# Patient Record
Sex: FEMALE | URBAN - METROPOLITAN AREA
[De-identification: names, ages, dates, MRNs, and addresses within clinical notes are randomized per-mention and may not be internally consistent; named-entity substitution may affect disease eponyms.]

---

## 2021-05-27 ENCOUNTER — RECORDS - HEALTHEAST (OUTPATIENT)
Dept: ADMINISTRATIVE | Facility: CLINIC | Age: 71
End: 2021-05-27

## 2022-08-16 ENCOUNTER — APPOINTMENT (OUTPATIENT)
Dept: GENERAL RADIOLOGY | Age: 72
End: 2022-08-16
Payer: MEDICARE

## 2022-08-16 ENCOUNTER — APPOINTMENT (OUTPATIENT)
Dept: CT IMAGING | Age: 72
End: 2022-08-16
Payer: MEDICARE

## 2022-08-16 ENCOUNTER — HOSPITAL ENCOUNTER (EMERGENCY)
Age: 72
Discharge: LEFT BEFORE DISCHARGE | End: 2022-08-16
Attending: EMERGENCY MEDICINE
Payer: MEDICARE

## 2022-08-16 VITALS
HEART RATE: 131 BPM | WEIGHT: 150.35 LBS | OXYGEN SATURATION: 100 % | TEMPERATURE: 98.2 F | SYSTOLIC BLOOD PRESSURE: 157 MMHG | RESPIRATION RATE: 19 BRPM | DIASTOLIC BLOOD PRESSURE: 102 MMHG

## 2022-08-16 DIAGNOSIS — I48.91 ATRIAL FIBRILLATION WITH RAPID VENTRICULAR RESPONSE (HCC): ICD-10-CM

## 2022-08-16 DIAGNOSIS — R93.89 ABNORMAL CHEST X-RAY: ICD-10-CM

## 2022-08-16 DIAGNOSIS — G45.9 TRANSIENT ISCHEMIC ATTACK: Primary | ICD-10-CM

## 2022-08-16 LAB
A/G RATIO: 1.5 (ref 1.1–2.2)
ALBUMIN SERPL-MCNC: 4.3 G/DL (ref 3.4–5)
ALP BLD-CCNC: 167 U/L (ref 40–129)
ALT SERPL-CCNC: <5 U/L (ref 10–40)
ANION GAP SERPL CALCULATED.3IONS-SCNC: 13 MMOL/L (ref 3–16)
APTT: 26.5 SEC (ref 23–34.3)
AST SERPL-CCNC: 18 U/L (ref 15–37)
BASOPHILS ABSOLUTE: 0 K/UL (ref 0–0.2)
BASOPHILS RELATIVE PERCENT: 0.5 %
BILIRUB SERPL-MCNC: 0.4 MG/DL (ref 0–1)
BUN BLDV-MCNC: 9 MG/DL (ref 7–20)
CALCIUM SERPL-MCNC: 9.3 MG/DL (ref 8.3–10.6)
CHLORIDE BLD-SCNC: 103 MMOL/L (ref 99–110)
CO2: 25 MMOL/L (ref 21–32)
CREAT SERPL-MCNC: 0.6 MG/DL (ref 0.6–1.2)
EOSINOPHILS ABSOLUTE: 0.1 K/UL (ref 0–0.6)
EOSINOPHILS RELATIVE PERCENT: 1 %
GFR AFRICAN AMERICAN: >60
GFR NON-AFRICAN AMERICAN: >60
GLUCOSE BLD-MCNC: 104 MG/DL (ref 70–99)
GLUCOSE BLD-MCNC: 118 MG/DL (ref 70–99)
HCT VFR BLD CALC: 36.6 % (ref 36–48)
HEMOGLOBIN: 12.2 G/DL (ref 12–16)
INR BLD: 0.98 (ref 0.87–1.14)
LYMPHOCYTES ABSOLUTE: 0.4 K/UL (ref 1–5.1)
LYMPHOCYTES RELATIVE PERCENT: 9 %
MCH RBC QN AUTO: 29.1 PG (ref 26–34)
MCHC RBC AUTO-ENTMCNC: 33.4 G/DL (ref 31–36)
MCV RBC AUTO: 87.2 FL (ref 80–100)
MONOCYTES ABSOLUTE: 0.4 K/UL (ref 0–1.3)
MONOCYTES RELATIVE PERCENT: 7.1 %
NEUTROPHILS ABSOLUTE: 4 K/UL (ref 1.7–7.7)
NEUTROPHILS RELATIVE PERCENT: 82.4 %
PDW BLD-RTO: 15.5 % (ref 12.4–15.4)
PERFORMED ON: ABNORMAL
PLATELET # BLD: 228 K/UL (ref 135–450)
PMV BLD AUTO: 7.7 FL (ref 5–10.5)
POTASSIUM REFLEX MAGNESIUM: 4.3 MMOL/L (ref 3.5–5.1)
PROTHROMBIN TIME: 12.9 SEC (ref 11.7–14.5)
RBC # BLD: 4.2 M/UL (ref 4–5.2)
SODIUM BLD-SCNC: 141 MMOL/L (ref 136–145)
TOTAL PROTEIN: 7.1 G/DL (ref 6.4–8.2)
TROPONIN: <0.01 NG/ML
WBC # BLD: 4.9 K/UL (ref 4–11)

## 2022-08-16 PROCEDURE — 80053 COMPREHEN METABOLIC PANEL: CPT

## 2022-08-16 PROCEDURE — 85025 COMPLETE CBC W/AUTO DIFF WBC: CPT

## 2022-08-16 PROCEDURE — 93005 ELECTROCARDIOGRAM TRACING: CPT | Performed by: EMERGENCY MEDICINE

## 2022-08-16 PROCEDURE — 71045 X-RAY EXAM CHEST 1 VIEW: CPT

## 2022-08-16 PROCEDURE — 70496 CT ANGIOGRAPHY HEAD: CPT

## 2022-08-16 PROCEDURE — 6360000004 HC RX CONTRAST MEDICATION: Performed by: EMERGENCY MEDICINE

## 2022-08-16 PROCEDURE — 85610 PROTHROMBIN TIME: CPT

## 2022-08-16 PROCEDURE — 84484 ASSAY OF TROPONIN QUANT: CPT

## 2022-08-16 PROCEDURE — 36415 COLL VENOUS BLD VENIPUNCTURE: CPT

## 2022-08-16 PROCEDURE — 99285 EMERGENCY DEPT VISIT HI MDM: CPT

## 2022-08-16 PROCEDURE — 70450 CT HEAD/BRAIN W/O DYE: CPT

## 2022-08-16 PROCEDURE — 6370000000 HC RX 637 (ALT 250 FOR IP): Performed by: EMERGENCY MEDICINE

## 2022-08-16 PROCEDURE — 85730 THROMBOPLASTIN TIME PARTIAL: CPT

## 2022-08-16 RX ORDER — ATENOLOL 50 MG/1
50 TABLET ORAL DAILY
Qty: 30 TABLET | Refills: 1 | Status: SHIPPED | OUTPATIENT
Start: 2022-08-16 | End: 2022-08-24

## 2022-08-16 RX ADMIN — APIXABAN 5 MG: 5 TABLET, FILM COATED ORAL at 19:58

## 2022-08-16 RX ADMIN — METOPROLOL TARTRATE 50 MG: 25 TABLET, FILM COATED ORAL at 19:58

## 2022-08-16 RX ADMIN — IOPAMIDOL 75 ML: 755 INJECTION, SOLUTION INTRAVENOUS at 18:22

## 2022-08-16 ASSESSMENT — ENCOUNTER SYMPTOMS
EYE DISCHARGE: 0
SHORTNESS OF BREATH: 0
DIARRHEA: 0
VOMITING: 0
COUGH: 0
EYE PAIN: 0
NAUSEA: 0
SORE THROAT: 0
WHEEZING: 0
BACK PAIN: 0
RHINORRHEA: 0
ABDOMINAL PAIN: 0

## 2022-08-16 NOTE — CONSULTS
I was contacted to provide recommendations on fibrinolytics and/or endovascular therapies for Ms. Jossie Wilson, who had reported left leg weakness, left facial droop, and dysarthria, last known well around 15:30. I saw and evaluated the patient over telemedicine. Although at onset her leg weakness was so profound that she says she couldn't lift it, at the time of my eval she was nearly back to her baseline. She did have a left lower facial droop and subtle left leg weakness (no drift, so I did not score her for it on the NIHSS, but it did seem as though she had more difficulty holding it up than the right). Full NIHSS as follows:    NIHSS was performed by myself at 18:45hr. NIHSS total score was 1. Score breakdown is as follows: LOC = 0, LOC questions = 0, LOC commands = 0, best gaze = 0, visual = 0, facial palsy = 1, motor LUE = 0, motor RUE = 0, motor LLE = 0, motor RLE = 0, limb ataxia = 0, sensory = 0, best language = 0, dysarthria = 0, extinction/inattention = 0. No hemorrhage or large vessel occlusion seen on her CT/CTA of the head/neck. I discussed the risks/benefits of lysis with the patient while her daughter was present in the room. Given the non-disabling nature of her deficits I would not recommend fibrinolysis and there is no indication for any endovascular intervention.       Tequila Dougherty MD  [facility] Stroke Team

## 2022-08-16 NOTE — ED PROVIDER NOTES
[facility]  eMERGENCY dEPARTMENT eNCOUnter        Pt Name: Silvino Esparza  MRN: [id number]  Mag[place]fgiov[place] 1950  Date of evaluation: 8/16/2022  Provider: Rupinder Grajeda MD  PCP: No primary care provider on file. CHIEF COMPLAINT       Chief Complaint   Patient presents with    Extremity Weakness       HISTORY OFPRESENT ILLNESS   (Location/Symptom, Timing/Onset, Context/Setting, Quality, Duration, Modifying Factors,Severity)  Note limiting factors. Silvino Esparza is a 66 y.o. female   with a history of    has no past medical history on file. Who presents with left-sided weakness. Last known well was around 330 this afternoon. Basically she got weak on the left side and could not get off the couch. She called her daughter around 5:00 and the patient comes in via EMS. She has no pain. Her symptoms are improving. She has not seen a doctor in 48 years. Nursing Notes were all reviewed and agreed with or any disagreements were addressed  in the HPI. REVIEW OF SYSTEMS    (2-9 systems for level 4, 10 or more for level 5)     Review of Systems   Constitutional:  Negative for chills, fatigue and fever. HENT:  Negative for ear pain, rhinorrhea and sore throat. Eyes:  Negative for pain, discharge and visual disturbance. Respiratory:  Negative for cough, shortness of breath and wheezing. Cardiovascular:  Negative for chest pain, palpitations and leg swelling. Gastrointestinal:  Negative for abdominal pain, diarrhea, nausea and vomiting. Genitourinary:  Negative for difficulty urinating, dysuria, pelvic pain and vaginal discharge. Musculoskeletal:  Negative for arthralgias, back pain, joint swelling and neck pain. Skin:  Negative for rash. Allergic/Immunologic: Negative for environmental allergies. Neurological:  Positive for weakness. Negative for dizziness, seizures, syncope and headaches. Hematological:  Negative for adenopathy. Psychiatric/Behavioral:  Negative for dysphoric mood and suicidal ideas. The patient is not nervous/anxious. PAST MEDICAL HISTORY   No past medical history on file. SURGICAL HISTORY   No past surgical history on file. CURRENTMEDICATIONS       Previous Medications    No medications on file       ALLERGIES     Patient has no allergy information on record. FAMILY HISTORY     No family history on file. SOCIAL HISTORY       Social History     Socioeconomic History    Marital status:        SCREENINGS   NIH Stroke Scale  Interval: Baseline  Level of Consciousness (1a): Alert  LOC Questions (1b): Answers both correctly  LOC Commands (1c): Performs both tasks correctly  Best Gaze (2): Normal  Visual (3): No visual loss  Facial Palsy (4): Normal symmetrical movement  Motor Arm, Left (5a): No drift  Motor Arm, Right (5b): No drift  Motor Leg, Left (6a): No drift  Motor Leg, Right (6b): No drift  Limb Ataxia (7): Absent  Sensory (8): Normal  Best Language (9): No aphasia  Dysarthria (10): Normal  Extinction and Inattention (11): No abnormality  Total: 0         PHYSICAL EXAM    (up to 7 for level 4, 8 or more for level 5)     ED Triage Vitals   BP Temp Temp src Pulse Resp SpO2 Height Weight   -- -- -- -- -- -- -- --      weight is 150 lb 5.7 oz (68.2 kg). Her oral temperature is 98.2 °F (36.8 °C). Her blood pressure is 157/102 (abnormal) and her pulse is 131 (abnormal). Her respiration is 19 and oxygen saturation is 100%. Physical Exam  Constitutional:       Appearance: She is well-developed. She is not diaphoretic. HENT:      Head: Normocephalic and atraumatic. Right Ear: External ear normal.      Left Ear: External ear normal.   Eyes:      General: No scleral icterus. Right eye: No discharge. Left eye: No discharge. Neck:      Thyroid: No thyromegaly. Vascular: No JVD. Trachea: No tracheal deviation. Cardiovascular:      Rate and Rhythm: Normal rate. Rhythm irregularly irregular. Heart sounds: Normal heart sounds. No murmur heard. No friction rub. No gallop. Pulmonary:      Effort: Pulmonary effort is normal. No respiratory distress. Breath sounds: Normal breath sounds. No stridor. No wheezing or rales. Abdominal:      General: There is no distension. Palpations: Abdomen is soft. Tenderness: There is no abdominal tenderness. There is no guarding or rebound. Musculoskeletal:         General: No tenderness. Cervical back: Normal range of motion. Skin:     General: Skin is warm and dry. Findings: No rash (On exposed body surfaces). Neurological:      Mental Status: She is alert and oriented to person, place, and time. GCS: GCS eye subscore is 4. GCS verbal subscore is 5. GCS motor subscore is 6. Cranial Nerves: Cranial nerve deficit present. Sensory: Sensation is intact. Motor: Weakness present. Coordination: Coordination is intact. Coordination normal. Finger-Nose-Finger Test normal.      Gait: Gait is intact. Comments: So, initially I did notice that this seems to be some asymmetry in her smile and also her left eyebrow was lower than her right. When she raise the eyebrows they became symmetric. Patient assured me that there was no change in her smile. We used her phone in the camera to let her look at it herself. Later on her daughter came in and there was some ambiguity still. They then used the cameras on the phone to show it to her other daughter who is a nurse and she felt that she did have a left facial droop. In addition the nurse felt that the left leg was a bit weaker than the right. I got more of the impression that they were symmetric. So, we did upgrade her to an NIH of 2 from 0. It is not because her symptoms got worse but more of a change in the interpretation of her exam and history. Psychiatric:         Behavior: Behavior normal.         Thought Content:  Thought Physician who either signs orCo-signs this chart in the absence of a cardiologist.    EKG visualized preliminarily interpreted by myself shows atrial fibrillation with a rapid ventricular response. The rate is 127 the axis is 30. Typical diffuse nonspecific ST and T wave findings. RADIOLOGY:   plain film images such as CT, Ultrasound and MRI are read by the radiologist. Roberto Loosen radiographic images are visualized and preliminarily interpreted by the  EDProvider with the below findings:    CT HEAD WO CONTRAST    Addendum Date: 8/16/2022    ADDENDUM: Findings were discussed with Gricelda Hernandez at 6:33 pm on 8/16/2022. Result Date: 8/16/2022  EXAMINATION: CT OF THE HEAD WITHOUT CONTRAST  8/16/2022 6:13 pm TECHNIQUE: CT of the head was performed without the administration of intravenous contrast. Automated exposure control, iterative reconstruction, and/or weight based adjustment of the mA/kV was utilized to reduce the radiation dose to as low as reasonably achievable. COMPARISON: None. HISTORY: ORDERING SYSTEM PROVIDED HISTORY: Stroke Symptoms TECHNOLOGIST PROVIDED HISTORY: Has a \"code stroke\" or \"stroke alert\" been called? ->Yes Reason for exam:->Stroke Symptoms Decision Support Exception - unselect if not a suspected or confirmed emergency medical condition->Emergency Medical Condition (MA) FINDINGS: BRAIN/VENTRICLES: There is no acute intracranial hemorrhage, mass effect or midline shift. No abnormal extra-axial fluid collection. The gray-white differentiation is maintained without evidence of an acute infarct. There is no evidence of hydrocephalus. Mild chronic white matter microvascular ischemic change is noted. ORBITS: The visualized portion of the orbits demonstrate no acute abnormality. SINUSES: The visualized paranasal sinuses and mastoid air cells demonstrate no acute abnormality. SOFT TISSUES/SKULL:  No acute abnormality of the visualized skull or soft tissues.      No CT evidence of an acute infarct. XR CHEST PORTABLE    Result Date: 8/16/2022  EXAMINATION: ONE XRAY VIEW OF THE CHEST 8/16/2022 6:13 pm COMPARISON: None. HISTORY: ORDERING SYSTEM PROVIDED HISTORY: stroke symptoms TECHNOLOGIST PROVIDED HISTORY: Reason for exam:->stroke symptoms Reason for Exam: stroke symptoms FINDINGS: There is a 6.9 cm in diameter mass or masslike consolidation within the medial right lung apex. The heart size is normal.  The lungs are otherwise clear. Contrast is noted in both renal collecting systems. Mass within the medial right upper lobe. Recommend CT of the chest for further evaluation. CTA HEAD NECK W CONTRAST    Result Date: 8/16/2022  EXAMINATION: CTA OF THE HEAD AND NECK WITH CONTRAST 8/16/2022 6:13 pm: TECHNIQUE: CTA of the head and neck was performed with the administration of intravenous contrast. Multiplanar reformatted images are provided for review. MIP images are provided for review. Stenosis of the internal carotid arteries measured using NASCET criteria. Automated exposure control, iterative reconstruction, and/or weight based adjustment of the mA/kV was utilized to reduce the radiation dose to as low as reasonably achievable. COMPARISON: None. HISTORY: ORDERING SYSTEM PROVIDED HISTORY: Stroke Symptoms TECHNOLOGIST PROVIDED HISTORY: Has a \"code stroke\" or \"stroke alert\" been called? ->Yes Reason for exam:->Stroke Symptoms Decision Support Exception - unselect if not a suspected or confirmed emergency medical condition->Emergency Medical Condition (MA) Reason for Exam: Left sided weakness 1 hour PTA FINDINGS: CTA NECK: AORTIC ARCH/ARCH VESSELS: No dissection or arterial injury. No significant stenosis of the brachiocephalic or subclavian arteries. CAROTID ARTERIES: No dissection, arterial injury, or hemodynamically significant stenosis by NASCET criteria. There is beading of both mid cervical ICAs, likely reflecting fibromuscular dysplasia (FMD).  VERTEBRAL ARTERIES: No dissection, arterial injury, or significant stenosis. SOFT TISSUES: There is a mass or masslike consolidation within the right lung apex measuring approximately 4.4 x 6.7 cm in diameter. BONES: No acute osseous abnormality. CTA HEAD: ANTERIOR CIRCULATION: No significant stenosis of the intracranial internal carotid, anterior cerebral, or middle cerebral arteries. No aneurysm. Carotid siphon calcifications are present. POSTERIOR CIRCULATION: The vertebrobasilar system is within normal limits. There are severe stenoses of the P1 segment of the right posterior cerebral artery. OTHER: No dural venous sinus thrombosis on this non-dedicated study. BRAIN: See separately dictated noncontrast head CT report. No large vessel occlusion visualized within the head or neck. Severe stenoses of the proximal right PCA. Probable FMD of both cervical ICAs. Incidentally noted right apical lung mass or masslike consolidation. Correlate for clinical signs of pneumonia and recommend dedicated CT of the chest for further evaluation. PROCEDURES   Unless otherwise noted below, none     Procedures    CRITICAL CARE TIME   CRITICAL CARE: There was a high probability of clinically significant/life threatening deterioration in this patient's condition which required my urgent intervention. Total critical care time was 66 minutes. This excludes any time for separately reportable procedures.          CONSULTS:  IP CONSULT TO STROKE TEAM  IP CONSULT TO PHARMACY  PHARMACY TO CHANGE BASE FLUIDS    EMERGENCY DEPARTMENT COURSE and DIFFERENTIAL DIAGNOSIS/MDM:   Vitals:    Vitals:    08/16/22 1815 08/16/22 1915 08/16/22 1958 08/16/22 2000   BP: (!) 148/103 (!) 147/75 (!) 157/102 (!) 157/102   Pulse: (!) 120 (!) 122 (!) 128 (!) 131   Resp: 18 16  19   Temp: 98.3 °F (36.8 °C)   98.2 °F (36.8 °C)   TempSrc: Oral   Oral   SpO2: 99% 100%  100%   Weight: 150 lb 5.7 oz (68.2 kg)          Patient was given the following medications:  Medications   iopamidol (ISOVUE-370) 76 % injection 75 mL (75 mLs IntraVENous Given 22)   metoprolol tartrate (LOPRESSOR) tablet 50 mg (50 mg Oral Given 22)   apixaban (ELIQUIS) tablet 5 mg (5 mg Oral Given 22)       The patient was made a stroke alert. She was seen within 10 minutes. All metrics were met. My initial impression was that the NIH was 0 however after the family arrived and we gave her a point for the facial abnormality and further discussion with the nurse was that there was some weakness in the left leg compared to the right we upgraded this to 2. Again this was not because of a change in exam but made it rather more due to a combination of subjective and objective interpretation. Regardless, at that point we were at 3 hours. I used the televideo services with the stroke team, Dr. Rosalina Mckee, who talked to the patient and evaluated her. He felt that the burden of stroke was certainly not strong enough to warrant the risk of thrombolytic. The patient then decided that she wanted to go home. She basically does not want any aggressive health care. Apparently her  recently  from liver cancer and she watched him suffer over a year. She does not want to go through similar problems. I did sit and talk to her for at least 10 minutes about this and I explained that there was a very high probability of her having a major stroke in the next 2 days. In addition I pointed out the abnormality on the chest x-ray and the abnormality on her cardiogram.  She was quite adamant. She is fully oriented and I cannot keep her against her will. She ambulated to the bathroom without difficulty. She agreed to start some Eliquis and beta-blockers. I gave her the first dose here and wrote prescriptions for atenolol and Eliquis. She does not even have a family doctor for me to arrange follow-up. The grandson is going to stay with her over the next few days.   I did put in a primary care provider referral.  Unfortunately, she did sign out against advice. Is this patient to be included in the SEP-1 Core Measure due to severe sepsis or septic shock? No   Exclusion criteria - the patient is NOT to be included for SEP-1 Core Measure due to: Infection is not suspected    FINAL IMPRESSION      1. Transient ischemic attack    2. Atrial fibrillation with rapid ventricular response ([facility] [address]    3. Abnormal chest x-ray          DISPOSITION/PLAN   DISPOSITION [place] 08/16/2022 08:05:27 PM      PATIENT REFERRED [facility] Pre[facility]Services  [phone]        DISCHARGE MEDICATIONS:  New Prescriptions    APIXABAN (ELIQUIS) 5 MG TABS TABLET    Take 1 tablet by mouth in the morning and 1 tablet before bedtime. ATENOLOL (TENORMIN) 50 MG TABLET    Take 1 tablet by mouth in the morning.        DISCONTINUED MEDICATIONS:  Discontinued Medications    No medications on file              (Please note that portions of this note were completed with a voice recognition program.  Efforts were made to editthe dictations but occasionally words are mis-transcribed.)    Jhoana Agarwal MD (electronically signed)            Jhoana Agarwal MD  08/16/22 2011

## 2022-08-16 NOTE — Clinical Note
Abdoul Schneider accompanied Christophe Borrego to the emergency department on 8/16/2022. They may return to work on 08/20/2022. If you have any questions or concerns, please don't hesitate to call.       Jeni Marquez MD

## 2022-08-16 NOTE — Clinical Note
The patient has decided to leave against medical advice, because she just does not want to be admitted. She wants to go home and take care of her dog. Her  recently . She watched him suffer over a year with various medical procedures an d medications. She says if she is going to die then she will just let that happen. She has normal mental status and adequate capacity to make medical decisions. The patient refuses hospital admission and wants to be discharged. The risks ha ve been explained to the patient, including worsening illness, chronic pain, permanent disability, and death. The benefits of admission have also been explained, including the availability and proximity of nurses, physicians, monitoring, diagnosti c testing, and treatment. The patient was able to understand and state the risks and benefits of hospital admission. This was witnessed by nurse Kaylee Wilson and me. She had the opportunity to ask questions about her medical condition. The patien t was treated to the extent that she would allow, and knows that she may return for care at any time.

## 2022-08-17 LAB
EKG DIAGNOSIS: NORMAL
EKG Q-T INTERVAL: 326 MS
EKG QRS DURATION: 76 MS
EKG QTC CALCULATION (BAZETT): 473 MS
EKG R AXIS: 30 DEGREES
EKG T AXIS: 33 DEGREES
EKG VENTRICULAR RATE: 127 BPM

## 2022-08-17 PROCEDURE — 93010 ELECTROCARDIOGRAM REPORT: CPT | Performed by: INTERNAL MEDICINE

## 2022-08-17 NOTE — ED NOTES
Pt leaving AMA at this time. AMA paperwork signed by patient and placed in chart.      Diane Mejia RN  08/16/22 2024

## 2022-08-17 NOTE — ED NOTES
Pt taught on symptoms of strokes and when to come back into hospital.     Jason Thompson RN  08/16/22 2025

## 2022-08-17 NOTE — ED NOTES
Pt wanting to leave AMA at this time. Ernesto Holm MD made aware and at bedside with patient.      Yanteh Arreola RN  08/16/22 2023

## 2022-08-17 NOTE — ED NOTES
Stroke team MD states that he \"does not recommend TPA\" at this time. Demi Moran MD made aware.      Georgette Royal RN  08/16/22 2021

## 2022-08-20 ENCOUNTER — APPOINTMENT (OUTPATIENT)
Dept: CT IMAGING | Age: 72
DRG: 065 | End: 2022-08-20
Payer: MEDICARE

## 2022-08-20 ENCOUNTER — HOSPITAL ENCOUNTER (INPATIENT)
Age: 72
LOS: 4 days | Discharge: NURSING, REHAB OR LONG-TERM CARE | DRG: 065 | End: 2022-08-24
Attending: EMERGENCY MEDICINE | Admitting: INTERNAL MEDICINE
Payer: MEDICARE

## 2022-08-20 ENCOUNTER — APPOINTMENT (OUTPATIENT)
Dept: GENERAL RADIOLOGY | Age: 72
DRG: 065 | End: 2022-08-20
Payer: MEDICARE

## 2022-08-20 DIAGNOSIS — I63.511 CEREBROVASCULAR ACCIDENT (CVA) DUE TO OCCLUSION OF RIGHT MIDDLE CEREBRAL ARTERY (HCC): Primary | ICD-10-CM

## 2022-08-20 DIAGNOSIS — G81.94 LEFT HEMIPARESIS (HCC): ICD-10-CM

## 2022-08-20 DIAGNOSIS — R29.810 FACIAL DROOP: ICD-10-CM

## 2022-08-20 PROBLEM — I63.20 ISCHEMIC CEREBRAL VASCULAR ACCIDENT (CVA) DUE TO STENOSIS OF LARGE EXTRACRANIAL ARTERY (HCC): Status: ACTIVE | Noted: 2022-08-20

## 2022-08-20 LAB
A/G RATIO: 1.4 (ref 1.1–2.2)
ALBUMIN SERPL-MCNC: 3.9 G/DL (ref 3.4–5)
ALP BLD-CCNC: 137 U/L (ref 40–129)
ALT SERPL-CCNC: <5 U/L (ref 10–40)
ANION GAP SERPL CALCULATED.3IONS-SCNC: 13 MMOL/L (ref 3–16)
AST SERPL-CCNC: 17 U/L (ref 15–37)
BASOPHILS ABSOLUTE: 0 K/UL (ref 0–0.2)
BASOPHILS RELATIVE PERCENT: 0.4 %
BILIRUB SERPL-MCNC: 0.6 MG/DL (ref 0–1)
BUN BLDV-MCNC: 12 MG/DL (ref 7–20)
CALCIUM SERPL-MCNC: 9.3 MG/DL (ref 8.3–10.6)
CHLORIDE BLD-SCNC: 103 MMOL/L (ref 99–110)
CHP ED QC CHECK: 106
CO2: 24 MMOL/L (ref 21–32)
CREAT SERPL-MCNC: 0.5 MG/DL (ref 0.6–1.2)
EOSINOPHILS ABSOLUTE: 0 K/UL (ref 0–0.6)
EOSINOPHILS RELATIVE PERCENT: 0.9 %
GFR AFRICAN AMERICAN: >60
GFR NON-AFRICAN AMERICAN: >60
GLUCOSE BLD-MCNC: 106 MG/DL (ref 70–99)
GLUCOSE BLD-MCNC: 118 MG/DL (ref 70–99)
HCT VFR BLD CALC: 36.8 % (ref 36–48)
HEMOGLOBIN: 12.2 G/DL (ref 12–16)
INR BLD: 1.13 (ref 0.87–1.14)
LYMPHOCYTES ABSOLUTE: 0.5 K/UL (ref 1–5.1)
LYMPHOCYTES RELATIVE PERCENT: 9.3 %
MCH RBC QN AUTO: 28.9 PG (ref 26–34)
MCHC RBC AUTO-ENTMCNC: 33.2 G/DL (ref 31–36)
MCV RBC AUTO: 87.1 FL (ref 80–100)
MONOCYTES ABSOLUTE: 0.4 K/UL (ref 0–1.3)
MONOCYTES RELATIVE PERCENT: 7.6 %
NEUTROPHILS ABSOLUTE: 4.6 K/UL (ref 1.7–7.7)
NEUTROPHILS RELATIVE PERCENT: 81.8 %
PDW BLD-RTO: 15.5 % (ref 12.4–15.4)
PERFORMED ON: ABNORMAL
PLATELET # BLD: 229 K/UL (ref 135–450)
PMV BLD AUTO: 8.8 FL (ref 5–10.5)
POTASSIUM REFLEX MAGNESIUM: 3.8 MMOL/L (ref 3.5–5.1)
PROTHROMBIN TIME: 14.5 SEC (ref 11.7–14.5)
RBC # BLD: 4.23 M/UL (ref 4–5.2)
SODIUM BLD-SCNC: 140 MMOL/L (ref 136–145)
TOTAL PROTEIN: 6.7 G/DL (ref 6.4–8.2)
TROPONIN: <0.01 NG/ML
WBC # BLD: 5.6 K/UL (ref 4–11)

## 2022-08-20 PROCEDURE — 6360000004 HC RX CONTRAST MEDICATION: Performed by: EMERGENCY MEDICINE

## 2022-08-20 PROCEDURE — 80053 COMPREHEN METABOLIC PANEL: CPT

## 2022-08-20 PROCEDURE — 71045 X-RAY EXAM CHEST 1 VIEW: CPT

## 2022-08-20 PROCEDURE — 93005 ELECTROCARDIOGRAM TRACING: CPT | Performed by: EMERGENCY MEDICINE

## 2022-08-20 PROCEDURE — 70496 CT ANGIOGRAPHY HEAD: CPT

## 2022-08-20 PROCEDURE — 84484 ASSAY OF TROPONIN QUANT: CPT

## 2022-08-20 PROCEDURE — 99285 EMERGENCY DEPT VISIT HI MDM: CPT

## 2022-08-20 PROCEDURE — 1200000000 HC SEMI PRIVATE

## 2022-08-20 PROCEDURE — 70450 CT HEAD/BRAIN W/O DYE: CPT

## 2022-08-20 PROCEDURE — 85025 COMPLETE CBC W/AUTO DIFF WBC: CPT

## 2022-08-20 PROCEDURE — 85610 PROTHROMBIN TIME: CPT

## 2022-08-20 RX ORDER — ONDANSETRON 2 MG/ML
4 INJECTION INTRAMUSCULAR; INTRAVENOUS EVERY 6 HOURS PRN
Status: DISCONTINUED | OUTPATIENT
Start: 2022-08-20 | End: 2022-08-24 | Stop reason: HOSPADM

## 2022-08-20 RX ORDER — M-VIT,TX,IRON,MINS/CALC/FOLIC 27MG-0.4MG
1 TABLET ORAL DAILY
COMMUNITY

## 2022-08-20 RX ORDER — POLYETHYLENE GLYCOL 3350 17 G/17G
17 POWDER, FOR SOLUTION ORAL DAILY PRN
Status: DISCONTINUED | OUTPATIENT
Start: 2022-08-20 | End: 2022-08-24 | Stop reason: HOSPADM

## 2022-08-20 RX ORDER — CLOPIDOGREL BISULFATE 75 MG/1
75 TABLET ORAL DAILY
Status: DISCONTINUED | OUTPATIENT
Start: 2022-08-21 | End: 2022-08-24 | Stop reason: HOSPADM

## 2022-08-20 RX ORDER — ATORVASTATIN CALCIUM 80 MG/1
80 TABLET, FILM COATED ORAL NIGHTLY
Status: DISCONTINUED | OUTPATIENT
Start: 2022-08-21 | End: 2022-08-24 | Stop reason: HOSPADM

## 2022-08-20 RX ORDER — ASPIRIN 81 MG/1
81 TABLET ORAL DAILY
Status: DISCONTINUED | OUTPATIENT
Start: 2022-08-21 | End: 2022-08-21

## 2022-08-20 RX ORDER — ONDANSETRON 4 MG/1
4 TABLET, ORALLY DISINTEGRATING ORAL EVERY 8 HOURS PRN
Status: DISCONTINUED | OUTPATIENT
Start: 2022-08-20 | End: 2022-08-24 | Stop reason: HOSPADM

## 2022-08-20 RX ADMIN — IOPAMIDOL 75 ML: 755 INJECTION, SOLUTION INTRAVENOUS at 20:30

## 2022-08-20 ASSESSMENT — ENCOUNTER SYMPTOMS
CHEST TIGHTNESS: 0
DIARRHEA: 0
NAUSEA: 0
CONSTIPATION: 0
SHORTNESS OF BREATH: 0
VOMITING: 0
SORE THROAT: 0
ABDOMINAL PAIN: 0

## 2022-08-20 ASSESSMENT — PAIN - FUNCTIONAL ASSESSMENT
PAIN_FUNCTIONAL_ASSESSMENT: NONE - DENIES PAIN
PAIN_FUNCTIONAL_ASSESSMENT: NONE - DENIES PAIN

## 2022-08-20 NOTE — ED PROVIDER NOTES
dysuria, frequency and urgency. Musculoskeletal:  Negative for arthralgias and neck pain. Skin:  Negative for rash and wound. Neurological:  Positive for facial asymmetry, weakness and headaches. Negative for numbness. Psychiatric/Behavioral:  Negative for agitation, behavioral problems and confusion. Except as noted above the remainder of the review of systems was reviewed and negative. PAST MEDICAL HISTORY   History reviewed. No pertinent past medical history. SURGICALHISTORY     History reviewed. No pertinent surgical history. CURRENT MEDICATIONS       Current Discharge Medication List        CONTINUE these medications which have NOT CHANGED    Details   Multiple Vitamins-Minerals (THERAPEUTIC MULTIVITAMIN-MINERALS) tablet Take 1 tablet by mouth daily      atenolol (TENORMIN) 50 MG tablet Take 1 tablet by mouth in the morning. Qty: 30 tablet, Refills: 1      apixaban (ELIQUIS) 5 MG TABS tablet Take 1 tablet by mouth in the morning and 1 tablet before bedtime. Qty: 60 tablet, Refills: 0                  Patient has no known allergies. FAMILY HISTORY     History reviewed. No pertinent family history. SOCIAL HISTORY       Social History     Socioeconomic History    Marital status:      Spouse name: None    Number of children: None    Years of education: None    Highest education level: None       SCREENINGS   NIH Stroke Scale  Interval: Baseline  Level of Consciousness (1a): Alert  LOC Questions (1b): Answers both correctly  LOC Commands (1c): Performs both tasks correctly  Best Gaze (2): Normal  Visual (3): No visual loss  Facial Palsy (4): (!) Complete paralysis of one or both sides  Motor Arm, Left (5a): No movement  Motor Arm, Right (5b): No drift  Motor Leg, Left (6a): No movement  Motor Leg, Right (6b):  No drift  Limb Ataxia (7): (!) Present in one limb  Sensory (8): Normal  Best Language (9): No aphasia  Dysarthria (10): Mild to moderate, slurs some words  Extinction and Inattention (11): No abnormality  Total: 13         PHYSICAL EXAM    (up to 7 for level 4, 8 or more for level 5)     Vitals:    08/20/22 1932 08/20/22 1940 08/20/22 2309 08/20/22 2330   BP: (!) 179/92  (!) 165/75 (!) 146/90   Pulse: 87  74 79   Resp: 18  16 18   Temp: 98.5 °F (36.9 °C)   97.7 °F (36.5 °C)   TempSrc: Oral   Oral   SpO2: 97%  100% 93%   Weight: 142 lb 13.7 oz (64.8 kg) 142 lb 13.7 oz (64.8 kg)     Height:  5' 6\" (1.676 m)           Physical Exam  Vitals and nursing note reviewed. Constitutional:       General: She is not in acute distress. Appearance: She is well-developed. She is not diaphoretic. HENT:      Head: Normocephalic and atraumatic. Eyes:      Conjunctiva/sclera: Conjunctivae normal.      Pupils: Pupils are equal, round, and reactive to light. Cardiovascular:      Rate and Rhythm: Normal rate and regular rhythm. Heart sounds: Normal heart sounds. Pulmonary:      Effort: Pulmonary effort is normal. No respiratory distress. Breath sounds: Normal breath sounds. No wheezing or rales. Abdominal:      General: Bowel sounds are normal. There is no distension. Palpations: Abdomen is soft. Tenderness: There is no abdominal tenderness. There is no rebound. Musculoskeletal:         General: No tenderness. Normal range of motion. Cervical back: Normal range of motion and neck supple. Skin:     General: Skin is warm and dry. Neurological:      Mental Status: She is alert and oriented to person, place, and time. GCS: GCS eye subscore is 4. GCS verbal subscore is 5. GCS motor subscore is 6. Cranial Nerves: Facial asymmetry (left sided droop) present. No cranial nerve deficit. Sensory: Sensation is intact. Motor: Weakness (LUE and LLE) present. Deep Tendon Reflexes: Reflexes are normal and symmetric. Psychiatric:         Behavior: Behavior normal.         Thought Content:  Thought content normal. Judgment: Judgment normal.       RESULTS     EKG: All EKG's are interpreted by the Emergency Department Physician who either signs or Co-signsthis chart in the absence of a cardiologist.  Atrial fibrillation with normal rate of 88, normal axis, QRS 80, QTc 433, no ST elevations, nonspecific ST changes, no acute change compared EKG from 8/16/2022 except patient now rate controlled A. fib. RADIOLOGY:   Non-plain filmimages such as CT, Ultrasound and MRI are read by the radiologist. Plain radiographic images are visualized and preliminarily interpreted by the emergency physician with the below findings:      Interpretation per the Radiologist below, if available at the time ofthis note:     [address]   Final Result   No significant interval change compared to the prior exam from 08/16/2022. No large vessel occlusion visualized. RECOMMENDATIONS:   Unavailable         CT HEAD WO CONTRAST   Final Result   Addendum (preliminary) 1 of 1   ADDENDUM:   Findings were discussed with MOSES Zamudio at 8:50 pm on 8/20/2022. Final   Likely acute infarct within the right mid corona radiata. XR CHEST PORTABLE   Final Result   Redemonstration of right apical lung mass.                ED BEDSIDE ULTRASOUND:   Performed by ED Physician - none    LABS:  Labs Reviewed   CBC WITH AUTO DIFFERENTIAL - Abnormal; Notable for the following components:       Result Value    RDW 15.5 (*)     Lymphocytes Absolute 0.5 (*)     All other components within normal limits   COMPREHENSIVE METABOLIC PANEL W/ REFLEX TO MG FOR LOW K - Abnormal; Notable for the following components:    Glucose 118 (*)     Creatinine 0.5 (*)     Alkaline Phosphatase 137 (*)     ALT <5 (*)     All other components within normal limits   POCT GLUCOSE - Abnormal; Notable for the following components:    POC Glucose 106 (*)     All other components within normal limits   POCT GLUCOSE - Normal   TROPONIN   PROTIME-INR       All other labs were within normal range or not returned as of this dictation. EMERGENCY DEPARTMENT COURSE and DIFFERENTIAL DIAGNOSIS/MDM:   Vitals:    Vitals:    08/20/22 1932 08/20/22 1940 08/20/22 2309 08/20/22 2330   BP: (!) 179/92  (!) 165/75 (!) 146/90   Pulse: 87  74 79   Resp: 18  16 18   Temp: 98.5 °F (36.9 °C)   97.7 °F (36.5 °C)   TempSrc: Oral   Oral   SpO2: 97%  100% 93%   Weight: 142 lb 13.7 oz (64.8 kg) 142 lb 13.7 oz (64.8 kg)     Height:  5' 6\" (1.676 m)           MDM  Number of Diagnoses or Management Options  Cerebrovascular accident (CVA) due to occlusion of right middle cerebral artery (HCC)  Facial droop  Left hemiparesis ([facility] [address]  Diagnosis management comments: Stroke, TIA, other    Patient seen and evaluated. History and physical as above. Patient presents with left-sided weakness. Patient has hemiparesis on the left side as well as left-sided facial droop. Did have diagnosis of TIA 4 days ago but signed out AMA at that time. Patient now has symptoms of completed stroke. With patient's symptoms worsening over the past 4 days we will continue with repeat imaging and blood work. Patient will need admission for placement. Amount and/or Complexity of Data Reviewed  Clinical lab tests: ordered and reviewed  Tests in the radiology section of CPT®: ordered and reviewed  Tests in the medicine section of CPT®: ordered and reviewed  Review and summarize past medical records: yes  Independent visualization of images, tracings, or specimens: yes    Risk of Complications, Morbidity, and/or Mortality  Presenting problems: moderate  Diagnostic procedures: moderate  Management options: moderate    Critical Care  Total time providing critical care: 30-74 minutes          REASSESSMENT     ED Course as of 08/20/22 2334   Sat Aug 20, 2022   2056 CT head shows acute infarct within the right mid corona radiata. CTA head and neck without any LVO. WBCs 5.6 with hemoglobin 12.2. Glucose 106. Troponin negative.   Coags within normal limits. Creatinine 0.5 BUN of 12. Electrolytes within normal limits. Patient updated on results. Discussed admission for further stroke work-up and placement. Patient agreeable. Consult placed to hospitalist for admission. [DS]   2114 Spoke with patients daughter, Carey Pena, to discuss patients care plan and admission. Patient was agreeable to me speaking with her daughter.   [DS]      ED Course User Index  [DS] Natalya Hawley MD       Is this patient to be included in the SEP-1 Core Measure due to severe sepsis or septic shock? No   Exclusion criteria - the patient is NOT to be included for SEP-1 Core Measure due to: Infection is not suspected      CRITICAL CARE TIME   Total Critical Care time was 35 minutes, excluding separatelyreportable procedures. There was a high probability ofclinically significant/life threatening deterioration in the patient's condition which required my urgent intervention. Left hemiparesis and acute stroke    CONSULTS:  IP CONSULT TO PHARMACY  PHARMACY TO CHANGE BASE FLUIDS  IP CONSULT TO HOSPITALIST  IP CONSULT TO NEUROLOGY    PROCEDURES:  Unless otherwise noted below, none     Procedures        FINAL IMPRESSION      1. Cerebrovascular accident (CVA) due to occlusion of right middle cerebral artery ([address]    2. Left hemiparesis ([address]    3. Facial droop          DISPOSITION/PLAN   DISPOSITION Admitted 08/20/2022 09:17:03 PM      PATIENT REFERREDTO:  No follow-up provider specified.     DISCHARGEMEDICATIONS:  Current Discharge Medication List             (Please note that portions of this note were completed with a voice recognition program.  Efforts were made to edit the dictations but occasionally words are mis-transcribed.)    Natalya Hawley MD (electronically signed)  Attending Emergency Physician          Natalya Hawley MD  08/20/22 0957

## 2022-08-20 NOTE — ED TRIAGE NOTES
Patient was brought in by EMS for worsening left sided weakness. Patient was treated in the ED on Tuesday for TIA, comes back today for severe worsening symptoms. Patient states that symptoms have been worsening since Tuesday 16 August 2022. On inspection patient has visible left facial droop. MD aware and in the room.

## 2022-08-21 PROBLEM — I48.0 PAROXYSMAL ATRIAL FIBRILLATION (HCC): Status: ACTIVE | Noted: 2022-08-21

## 2022-08-21 PROBLEM — R91.8 MASS OF UPPER LOBE OF RIGHT LUNG: Status: ACTIVE | Noted: 2022-08-21

## 2022-08-21 LAB
ANION GAP SERPL CALCULATED.3IONS-SCNC: 10 MMOL/L (ref 3–16)
BUN BLDV-MCNC: 13 MG/DL (ref 7–20)
CALCIUM SERPL-MCNC: 9 MG/DL (ref 8.3–10.6)
CHLORIDE BLD-SCNC: 108 MMOL/L (ref 99–110)
CHOLESTEROL, TOTAL: 208 MG/DL (ref 0–199)
CO2: 25 MMOL/L (ref 21–32)
CREAT SERPL-MCNC: 0.5 MG/DL (ref 0.6–1.2)
ESTIMATED AVERAGE GLUCOSE: 102.5 MG/DL
GFR AFRICAN AMERICAN: >60
GFR NON-AFRICAN AMERICAN: >60
GLUCOSE BLD-MCNC: 92 MG/DL (ref 70–99)
HBA1C MFR BLD: 5.2 %
HCT VFR BLD CALC: 33.5 % (ref 36–48)
HDLC SERPL-MCNC: 59 MG/DL (ref 40–60)
HEMOGLOBIN: 11.1 G/DL (ref 12–16)
LDL CHOLESTEROL CALCULATED: 137 MG/DL
MCH RBC QN AUTO: 29.1 PG (ref 26–34)
MCHC RBC AUTO-ENTMCNC: 33.3 G/DL (ref 31–36)
MCV RBC AUTO: 87.5 FL (ref 80–100)
PDW BLD-RTO: 15.3 % (ref 12.4–15.4)
PLATELET # BLD: 182 K/UL (ref 135–450)
PMV BLD AUTO: 8.1 FL (ref 5–10.5)
POTASSIUM REFLEX MAGNESIUM: 3.8 MMOL/L (ref 3.5–5.1)
RBC # BLD: 3.83 M/UL (ref 4–5.2)
SODIUM BLD-SCNC: 143 MMOL/L (ref 136–145)
TRIGL SERPL-MCNC: 61 MG/DL (ref 0–150)
TSH SERPL DL<=0.05 MIU/L-ACNC: 2.85 UIU/ML (ref 0.27–4.2)
VLDLC SERPL CALC-MCNC: 12 MG/DL
WBC # BLD: 4.1 K/UL (ref 4–11)

## 2022-08-21 PROCEDURE — 83036 HEMOGLOBIN GLYCOSYLATED A1C: CPT

## 2022-08-21 PROCEDURE — 97163 PT EVAL HIGH COMPLEX 45 MIN: CPT

## 2022-08-21 PROCEDURE — 6370000000 HC RX 637 (ALT 250 FOR IP): Performed by: NURSE PRACTITIONER

## 2022-08-21 PROCEDURE — 2060000000 HC ICU INTERMEDIATE R&B

## 2022-08-21 PROCEDURE — 36415 COLL VENOUS BLD VENIPUNCTURE: CPT

## 2022-08-21 PROCEDURE — 99222 1ST HOSP IP/OBS MODERATE 55: CPT | Performed by: INTERNAL MEDICINE

## 2022-08-21 PROCEDURE — 80048 BASIC METABOLIC PNL TOTAL CA: CPT

## 2022-08-21 PROCEDURE — 85027 COMPLETE CBC AUTOMATED: CPT

## 2022-08-21 PROCEDURE — 97530 THERAPEUTIC ACTIVITIES: CPT

## 2022-08-21 PROCEDURE — 80061 LIPID PANEL: CPT

## 2022-08-21 PROCEDURE — 97110 THERAPEUTIC EXERCISES: CPT

## 2022-08-21 PROCEDURE — 84443 ASSAY THYROID STIM HORMONE: CPT

## 2022-08-21 RX ADMIN — APIXABAN 5 MG: 5 TABLET, FILM COATED ORAL at 08:36

## 2022-08-21 RX ADMIN — APIXABAN 5 MG: 5 TABLET, FILM COATED ORAL at 01:26

## 2022-08-21 RX ADMIN — ATORVASTATIN CALCIUM 80 MG: 80 TABLET, FILM COATED ORAL at 21:44

## 2022-08-21 RX ADMIN — ASPIRIN 81 MG: 81 TABLET, COATED ORAL at 08:36

## 2022-08-21 RX ADMIN — ATORVASTATIN CALCIUM 80 MG: 80 TABLET, FILM COATED ORAL at 01:26

## 2022-08-21 RX ADMIN — CLOPIDOGREL BISULFATE 75 MG: 75 TABLET ORAL at 08:36

## 2022-08-21 NOTE — CONSULTS
History and Physical  [facility]   Cardiology    Chief Complaint:    HPI:     Patient is a 66 y.o. female who presents to the emergency department with EMS for evaluation of worsening left-sided weakness over the past 4 days. Patient was seen on 2022 in the emergency room and diagnosed with TIA as well as atrial fibrillation. Recommendation was for admission at that time but patient signed out AMA. Patient states that since she been home she has been having progressive left-sided weakness and is now no longer able to move her left upper lower extremity. Patient also has profound left-sided facial droop. Patient denies any numbness or weakness in the right side of the body. States she does have a headache. CT suggests a CVA. Her cxr reveals an apical mass. History reviewed. No pertinent past medical history. History reviewed. No pertinent surgical history. Medications Prior to Admission: Multiple Vitamins-Minerals (THERAPEUTIC MULTIVITAMIN-MINERALS) tablet, Take 1 tablet by mouth daily  atenolol (TENORMIN) 50 MG tablet, Take 1 tablet by mouth in the morning. apixaban (ELIQUIS) 5 MG TABS tablet, Take 1 tablet by mouth in the morning and 1 tablet before bedtime. No Known Allergies   Social History     Tobacco Use    Smoking status: Former     Types: Cigarettes     Quit date:      Years since quittin.6    Smokeless tobacco: Not on file   Substance Use Topics    Alcohol use: Not on file      History reviewed. No pertinent family history.      Review of Systems:  Constitutional: No Fever or Weight Loss  Eyes: No decreased vision  ENT: No Headaches, Hearing Loss or Vertigo  Cardiovascular: No chest pain, dyspnea on exertion, palpitations or loss of consciousness  Respiratory: No cough or wheezing    Gastrointestinal: No abdominal pain, appetite loss, blood in stools, constipation, diarrhea or heartburn  Genitourinary: No dysuria, trouble voiding, or hematuria  Musculoskeletal: No gait disturbance, weakness or joint complaints  Integumentary: No rash or pruritis  Neurological: stroke symptoms  Psychiatric: No anxiety or depression  Endocrine: No malaise, fatigue or temperature intolerance  Hematologic/Lymphatic: No bleeding problems, blood clots or swollen lymph nodes  Allergic/Immunologic: No nasal congestion or hives      Objective Data:     BP (!) 155/90   Pulse 89   Temp 98.2 °F (36.8 °C) (Oral)   Resp 17   Ht 5' 6\" (1.676 m)   Wt 139 lb 12.4 oz (63.4 kg)   SpO2 97%   BMI 22.56 kg/m²     General appearance: alert, appears stated age, and cooperative  Alert, awake, oriented x 3  Eyes:  No erythema  Head: atraumatic  Neck:  no JVD  Lungs: clear to auscultation bilaterally  Heart: normal apical impulse and irregularly irregular rhythm  Abdomen: soft, non-tender; bowel sounds normal; no masses,  no organomegaly  Extremities: extremities normal, atraumatic, no cyanosis or edema  Skin: Skin color, texture, turgor normal. No rashes or lesions  Hematologic: no remarkable bruising       ECG:  atrial fib with controlled rate on monitor      Data Review  No significant interval change compared to the prior exam from 08/16/2022. 8/20/22       No large vessel occlusion visualized. POSTERIOR CIRCULATION: The vertebrobasilar system is within normal limits. 8/16/22   There are severe stenoses of the P1 segment of the right posterior cerebral   artery. Likely acute infarct within the right mid corona radiata. 8/20/22  Recent Labs     08/20/22 1959 08/21/22  0648    143   K 3.8 3.8    108   CO2 24 25   BUN 12 13   CREATININE 0.5* 0.5*     Recent Labs     08/20/22 1959 08/21/22  0648   WBC 5.6 4.1   HGB 12.2 11.1*   HCT 36.8 33.5*   MCV 87.1 87.5    182     Lab Results   Component Value Date/Time    TROPONINI <0.01 08/20/2022 07:59 PM         Assessment:     Principal Problem:    Acute cerebrovascular accident (CVA) due to occlusion of right middle cerebral artery [facility])  Active Problems:    Paroxysmal atrial fibrillation (HCC)    Mass of upper lobe of right lung  Resolved Problems:    * No resolved hospital problems. *      Plan:     The mechanism of CVA not clear, ? Whether P1 segment of right psot cerebral artery at play. However, if no contraindication from nuero standpoint with new cva, continuing eliquis otherwise logical in afib. Will order tsh and echo to explore etiology of afib which is typically idiopathic.    Colleagues to follow  Reviewed in detail with patient, brother and grandson

## 2022-08-21 NOTE — PLAN OF CARE
Problem: Discharge Planning  Goal: Discharge to home or other facility with appropriate resources  Outcome: Progressing     Problem: Safety - Adult  Goal: Free from fall injury  Outcome: Progressing     Problem: ABCDS Injury Assessment  Goal: Absence of physical injury  Outcome: Progressing     Problem: Skin/Tissue Integrity  Goal: Absence of new skin breakdown  Description: 1. Monitor for areas of redness and/or skin breakdown  2. Assess vascular access sites hourly  3. Every 4-6 hours minimum:  Change oxygen saturation probe site  4. Every 4-6 hours:  If on nasal continuous positive airway pressure, respiratory therapy assess nares and determine need for appliance change or resting period.   Outcome: Progressing     Problem: Neurosensory - Adult  Goal: Achieves stable or improved neurological status  Outcome: Progressing  Goal: Achieves maximal functionality and self care  Outcome: Progressing

## 2022-08-21 NOTE — H&P
Urinalysis:    No results found for: Jeremiah Kugel, BACTERIA, RBCUA, BLOODU, Linda Brown, [address]    Radiology:     EKG:  I have reviewed the EKG with the following interpretation: atrial fibrillation, rate 88. No acute ST elevation    CTA HEAD NECK W CONTRAST   Final Result   No significant interval change compared to the prior exam from 08/16/2022. No large vessel occlusion visualized. RECOMMENDATIONS:   Unavailable         CT HEAD WO CONTRAST   Final Result   Addendum (preliminary) 1 of 1   ADDENDUM:   Findings were discussed with MOSES Sepulveda at 8:50 pm on 8/20/2022. Final   Likely acute infarct within the right mid corona radiata. XR CHEST PORTABLE   Final Result   Redemonstration of right apical lung mass. ASSESSMENT:    Active Hospital Problems    Diagnosis Date Noted    Acute cerebrovascular accident (CVA) due to occlusion of right middle cerebral artery [facility]) [I63.511] 08/20/2022     Priority: Medium         PLAN:    Acute CVA  - with symptoms: left side paralysis  - out of the tPA window  - head CT: likely acute infarct within the right mid corona radiata  - CTA head/neck: No flow limiting stenosis of head or neck, No LVO  - MRI and ECHO  - ASA, statin  - consult neurology   - check lipids, HBA1c    Atrial Fibrillation   - currently rate controlled  - continue eliquis and atenolol     DVT Prophylaxis: Eliquis  Diet: Diet NPO  Code Status: Full Code    PT/OT Eval Status: pending    [facility], APRN - CNP    Thank you No primary care provider on file. for the opportunity to be involved in this patient's care. If you have any questions or concerns please feel free to contact me at [phone].

## 2022-08-21 NOTE — PROGRESS NOTES
NAME:  William Portillo  YOB: 1950  MEDICAL RECORD NUMBER:  [id number]  TODAYS DATE:  8/21/2022    Discussed personal risk factors for Stroke /TIA with patient/family, and ways to reduce the risk for a recurrent stroke. Patient's personal risk factors which were identified are:     [] Alcohol Abuse: check with your physician before any alcohol consumption. [x] Atrial fibrillation: may cause blood clots. [] Drug Abuse: Seek help, talk with your doctor  [] Clotting Disorder  [] Diabetes  [x] Family history of stroke or heart disease  [x] High Blood Pressure/Hypertension: work with your physician. [x] High cholesterol: monitor cholesterol levels with your physician.   [] Overweight/Obesity: work with your physician for your ideal body weight.  [] Physical Inactivity: get regular exercise as directed by your physician. [x] Personal history of previous TIA or stroke  [] Poor Diet; decrease salt (sodium) in your diet, follow diet directed by physician. [] Smoking: Cigarette/Cigar: stop smoking. Advised pt. that you can reduce your risk for stroke/TIA by modifying/controlling the risk factors that you have. Pt.advised to take the medications as prescribed, which will be detailed in the discharge instructions, and to not stop taking them without consulting their physician. In addition, pt. advised to maintain a healthy diet, exercise regularly and to not smoke. [facility]'s Stroke treatment and prevention, Managing your recovery  notebook  provided and/or reviewed  with patient/family. The notebook includes, but not limited to, sections addressing warning signs & symptoms of a stroke, which are: sudden numbness or weakness especially on one side of the body, sudden confusion, difficulty speaking or understanding, sudden changes in vision, sudden dizziness or loss of balance/ coordination, sudden severe headache, syncope and seizure.   The need to call EMS ([phone]) immediately if signs & symptoms occur is emphasized . The notebook also provides education on Stroke community resources and stroke advocacy. The need for follow-up after discharge was highlighted with patient/family with them being able to repeat understanding of the importance of this.       Electronically signed by Sara Chambers RN on 8/21/2022 at 5:33 AM

## 2022-08-21 NOTE — CONSULTS
Reason for Consult:  left sided weakness  Attending Physician:  Juliana Jimenez MD      HISTORY OF PRESENT ILLNESS:            The patient is a 66 y.o. female who presented with worsening left sided weakness. She presented on 8-. Patient was in ED on 8- left sided weakness. She was diagnosed with TIA as well as atrial fibrillation. Patient left AMA. She was given Eliquis. Daughter reports at that time she has LLE weakness, left facial droop, and a little abnormal speech. On Wednesday, her speech was reportedly better. Thursday he speech was questionable worse and was tired. Friday she could not move left hand or stand. Saturday she worsened and came to ED. When seen by me she reported that her symptoms were stable. Daughter reported that patient was only taking eliquis once a day. Daughter reports that it is expensive for patient. Past Medical History:    History reviewed. No pertinent past medical history. Past Surgical History:    History reviewed. No pertinent surgical history. Medications:   Current Facility-Administered Medications: ondansetron (ZOFRAN-ODT) disintegrating tablet 4 mg, 4 mg, Oral, Q8H PRN **OR** ondansetron (ZOFRAN) injection 4 mg, 4 mg, IntraVENous, Q6H PRN  polyethylene glycol (GLYCOLAX) packet 17 g, 17 g, Oral, Daily PRN  perflutren lipid microspheres (DEFINITY) injection 1.65 mg, 1.5 mL, IntraVENous, ONCE PRN  atorvastatin (LIPITOR) tablet 80 mg, 80 mg, Oral, Nightly  apixaban (ELIQUIS) tablet 5 mg, 5 mg, Oral, BID  clopidogrel (PLAVIX) tablet 75 mg, 75 mg, Oral, Daily  aspirin EC tablet 81 mg, 81 mg, Oral, Daily   Medications Prior to Admission: Multiple Vitamins-Minerals (THERAPEUTIC MULTIVITAMIN-MINERALS) tablet, Take 1 tablet by mouth daily  atenolol (TENORMIN) 50 MG tablet, Take 1 tablet by mouth in the morning. apixaban (ELIQUIS) 5 MG TABS tablet, Take 1 tablet by mouth in the morning and 1 tablet before bedtime.     Allergies: Patient has no known allergies. Social History:  Social History     Socioeconomic History    Marital status:      Spouse name: Not on file    Number of children: Not on file    Years of education: Not on file    Highest education level: Not on file   Occupational History    Not on file   Tobacco Use    Smoking status: Former     Types: Cigarettes     Quit date:      Years since quittin.5    Smokeless tobacco: Not on file   Substance and Sexual Activity    Alcohol use: Not on file    Drug use: Not on file    Sexual activity: Not on file   Other Topics Concern    Not on file   Social History Narrative    Not on file     Social Determinants of Health     Financial Resource Strain: Not on file   Food Insecurity: Not on file   Transportation Needs: Not on file   Physical Activity: Not on file   Stress: Not on file   Social Connections: Not on file   Intimate Partner Violence: Not on file   Housing Stability: Not on file     Social History     Tobacco Use   Smoking Status Former    Types: Cigarettes    Quit date:     Years since quittin.6   Smokeless Tobacco Not on file     Social History     Substance and Sexual Activity   Drug Use Not on file     Social History     Substance and Sexual Activity   Alcohol Use None       Family History:   History reviewed. No pertinent family history. ROS:  Relevant ROS per HPI    Exam:  Blood pressure (!) 155/90, pulse 89, temperature 98.2 °F (36.8 °C), temperature source Oral, resp. rate 17, height 5' 6\" (1.676 m), weight 139 lb 12.4 oz (63.4 kg), SpO2 97 %. Constitutional    Vital signs: BP, HR, and RR reviewed   General Alert, no distress  Psychiatric: cooperative with examination, no  psychotic behavior noted.   Neurologic  Mental status:   orientation to person and place   General fund of knowledge grossly intact   Memory grossly intact   Attention intact as able to attend well to the exam     Language fluent in conversation   Comprehension intact; follows simple commands  Cranial nerves:   CN2: Visual Fields full w/o extinction on confrontational testing,   CN 3,4,6: has difficulty looking to the left and she favors looking to right. CN5: facial sensation symmetric   CN7:mild left eyelid closure weakness, left facial droop, + dysarthria but understandable  CN8: hearing grossly intact  Strength: No move LUE/LLE, good strength RUE/RLE  Sensory: light touch intact in all 4 extremities. Cerebellar/coordination: finger nose finger normal without ataxia    Can read/name    Studies:  Hga1c 5.2      CT H WO 8-  Impression   Likely acute infarct within the right mid corona radiata. CTA H and N 8-  Impression   No significant interval change compared to the prior exam from 08/16/2022. No large vessel occlusion visualized. Impression:  Left sided weakness  Dysarthria  Abnormal imaging of CNS  Atrial fibrillation    Patient had worsening of left sided weakness. She also has dysarthria. CT H was concerning for stroke. Symptoms may have worsened if extended stroke. She has recent diagnosis of atrial fibrillation and was only taking Eliquis once a day. Would not consider this a failure of eliquis. Recommendations:  - MRI brain WO (already ordered)  - TTE (already ordered)  - PT/OT/SP  - hold eliquis until MRI and based on stroke volume can determine when can restart anticoagulation. May need to consider alternative for eliquis as it is expensive for patient. Will defer to cardiology. (Defer to primary team to order)  - antiplatelet for now (recommend plavix as reported side effect of headache with aspirin). From neurology perspective will not need antiplatelet with anticoagulation.   - statin    Tresa Recio MD  Neurology

## 2022-08-21 NOTE — PROGRESS NOTES
Hospitalist Progress Note      PCP: No primary care provider on file. Date of Admission: 8/20/2022    Chief Complaint: worsening left sided weakness    Hospital Course: 66 y.o. female with recent diagnosis of a-fib presented to [facility] ED with worsening left upper and lower ext weakness. Pt was seen in the ED 8/16/22 and was found to be in a-fib. She also had mild weakness to left lower ext and facial droop. Stroke team was consulted but pt not a candidate for thrombolytic therapy as she had mild sx. She refused to stay for further evaluation and monitoring. She did agree to start rx of eliquis and atenolol. She has since progressed and now is unable to stand/walk. She has profound facial droop. She is not able to move left upper or lower extremity at all. No numbness or sensory change. CT head today shows likely acute infarct within the right mid corona radiata. Subjective: Pt up to chair, left side flaccid. Agreeable to rehab. Grandson at bedside. Denies cp sob palpitaitons, abd pain, headache, vision change. Reviewed POC, denies needs. Assessment/Plan:    Acute CVA  - with symptoms: left side paralysis  - out of the tPA window  - head CT: likely acute infarct within the right mid corona radiata  - CTA head/neck: No flow limiting stenosis of head or neck, No LVO  - MRI and ECHO pending  - ASA, plavix, statin  - consult neurology   - check lipids, HBA1c  -pt/ot/speech  -sw for dc planning  -neuro checks     Atrial Fibrillation   - currently rate controlled  - continue eliquis   -atenolol not ordered on admission, but rate is controlled  -tele  -cardiology consult  -echo    Right apical lung mass  -seen on cxr, 6.9 cm mass or masslike consolidation within the medial right lung apex.    -recommend ct of chest for further evaluation- will obtain tomorrow as pt had contrast with  8/20/22      Active Hospital Problems    Diagnosis     Acute cerebrovascular accident (CVA) due to occlusion of right middle cerebral artery (HCC) [I63.511]      Priority: Medium       Medications:  Reviewed    Infusion Medications   Scheduled Medications    atorvastatin  80 mg Oral Nightly    apixaban  5 mg Oral BID    clopidogrel  75 mg Oral Daily    aspirin  81 mg Oral Daily     PRN Meds: ondansetron **OR** ondansetron, polyethylene glycol, perflutren lipid microspheres      Intake/Output Summary (Last 24 hours) at 8/21/2022 0957  Last data filed at 8/21/2022 0135  Gross per 24 hour   Intake 90 ml   Output --   Net 90 ml       Physical Exam Performed:    BP (!) 152/78   Pulse 75   Temp 97.9 °F (36.6 °C) (Axillary)   Resp 15   Ht 5' 6\" (1.676 m)   Wt 139 lb 12.4 oz (63.4 kg)   SpO2 97%   BMI 22.56 kg/m²     General appearance: No apparent distress, appears stated age and cooperative. HEENT: Pupils equal, round, and reactive to light. Conjunctivae/corneas clear. Neck: Supple, with full range of motion. No jugular venous distention. Trachea midline. Respiratory:  Normal respiratory effort. Clear to auscultation, bilaterally without Rales/Wheezes/Rhonchi. Cardiovascular: Regular rate and rhythm with normal S1/S2 without murmurs, rubs or gallops. Abdomen: Soft, non-tender, non-distended with normal bowel sounds. Musculoskeletal: No clubbing, cyanosis or edema bilaterally. Full range of motion without deformity. Skin: Skin color, texture, turgor normal.  No rashes or lesions.   Neurologic:  left facial droop, left arm and leg flaccid  Psychiatric: Alert and oriented, thought content appropriate, normal insight  Capillary Refill: Brisk,< 3 seconds   Peripheral Pulses: +2 palpable, equal bilaterally       Labs:   Recent Labs     08/20/22 1959 08/21/22  0648   WBC 5.6 4.1   HGB 12.2 11.1*   HCT 36.8 33.5*    182     Recent Labs     08/20/22 1959 08/21/22  0648    143   K 3.8 3.8    108   CO2 24 25   BUN 12 13   CREATININE 0.5* 0.5*   CALCIUM 9.3 9.0     Recent Labs     08/20/22 1959   AST 17

## 2022-08-21 NOTE — PROGRESS NOTES
Physical Therapy  Facility/Department: [facility] 5N PROGRESSIVE CARE  Physical Therapy Initial Assessment    Name: William Portillo  : 1950  MRN: [id number]  Date of Service: 2022    Discharge Recommendations:  Continue to assess pending progress, IP Rehab   PT Equipment Recommendations  Other: Defer to next level of care. William Portillo scored a 7/ on the AM-PAC short mobility form. Current research shows that an AM-PAC score of 17 or less is typically not associated with a discharge to the patient's home setting. Based on the patient's AM-PAC score and their current functional mobility deficits, it is recommended that the patient have 5-7 sessions per week of Physical Therapy at d/c to increase the patient's independence. At this time, this patient demonstrates complex nursing, medical, and rehabilitative needs, and would benefit from intensive rehabilitation services upon discharge from the Inpatient setting. This patient demonstrates the ability to participate in and benefit from an intensive therapy program with a coordinated interdisciplinary team approach to foster frequent, structured, and documented communication among disciplines, who will work together to establish, prioritize, and achieve treatment goals. Please see assessment section for further patient specific details. If patient discharges prior to next session this note will serve as a discharge summary. Please see below for the latest assessment towards goals. Assessment   Body Structures, Functions, Activity Limitations Requiring Skilled Therapeutic Intervention: Decreased functional mobility ; Decreased strength;Decreased endurance;Decreased sensation;Decreased balance  Assessment: 65 y/o female admit 2022 with L Facial Droop, L Sided Weakness. CT Head : Likely Acute Infarct R Mid Corona Radiata. MRI pending. Recent to ED 2022 with TIA (pt left AMA). PMH as noted including A-Fib, TIA (2022).   PTA pt living in own home with grandson in multi level home with few steps to enter and 1st floor bed/bath; independent daily care and functional mobility (prior TIA, few days prior (8/16/2022). Pt reports progress L weak over next few days prior this admit. Currently,  Noting L Facial Droop/Head Turn L and diminished sustain tracking L eye. No active motor function L UE/LE. Dependent bed mob, oob via Maxi-Move. At this time, would recommend cont PT (5-7 setting). Will monitor pt's progress. Therapy Prognosis: Guarded; Fair  Decision Making: High Complexity  History: 65 y/o female admit 8/20/2022 with L Facial Droop, L Sided Weakness. CT Head : Likely Acute Infarct R Mid Corona Radiata. MRI pending. Recent to ED 8/16/2022 with TIA (pt left AMA). PMH as noted including A-Fib, TIA (8/16/2022). Exam: See above. Clinical Presentation: See above. Barriers to Learning: Diminishe insight extent deficits. Requires PT Follow-Up: Yes  Activity Tolerance  Activity Tolerance Comments: Noting L Facial Droop/Head Turn L and diminished sustain tracking L eye. No active motor function L UE/LE. Dependent bed mob, oob via Maxi-Move. Plan   Plan  Plan: 3-5 times per week  Current Treatment Recommendations: Strengthening, Balance training, Functional mobility training, Transfer training, Neuromuscular re-education, Safety education & training, Patient/Caregiver education & training  Safety Devices  Type of Devices: Call light within reach, Chair alarm in place, Left in chair, Nurse notified     Restrictions  Restrictions/Precautions  Restrictions/Precautions: NPO, Fall Risk  Position Activity Restriction  Other position/activity restrictions: L Hemiparesis. Subjective   General  Chart Reviewed: Yes  Patient assessed for rehabilitation services?: Yes  Additional Pertinent Hx: 65 y/o female admit 8/20/2022 with L Facial Droop, L Sided Weakness. CT Head : Likely Acute Infarct R Mid Corona Radiata. MRI pending. Recent to ED 8/16/2022 with TIA (pt left AMA). PMH as noted including A-Fib, TIA (8/16/2022). Family / Caregiver Present:  (Family (Brother, Rowdy 82) arrival as complete PT Eval.)  Referring Practitioner: Toney Hennessy NP  Diagnosis: CVA. Other (Comment): Pt follows simple commands. Subjective  Subjective: Pt agreeable to PT Eval/Rx. Social/Functional History  Social/Functional History  Lives With:  Jc Medina (Dusty Yost, 33 yr, \"works 3 jobs\"). )  Type of Home: House  Home Layout: Multi-level, Able to Live on Main level with bedroom/bathroom, Laundry in basement  Home Access: Stairs to enter with rails (4-5 steps to enter.)  Bathroom Shower/Tub: Tub/Shower unit  Bathroom Toilet: Standard  Bathroom Equipment:  (No DME pta.)  Bathroom Accessibility: Accessible  Home Equipment: Eden Rota, standard (Family obtained Stand Eden Rota following return home from ER (8/16/2022). 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 Additional Comments: Pt to ER 8/16/2022 with TIA; left AMA. Prior this, pt independent all care/mobility. Pt reports progress L sided weak since return home. Vision/Hearing  Vision  Vision:  (Diminished track L eye beyond/sustain L.)  Hearing  Hearing: Within functional limits    Cognition   Orientation  Overall Orientation Status: Within Functional Limits  Cognition  Overall Cognitive Status: Exceptions  Arousal/Alertness: Appropriate responses to stimuli  Following Commands: Follows one step commands with increased time  Attention Span: Appears intact  Insights: Decreased awareness of deficits     Objective        Observation/Palpation  Observation: L Facial Droop; Dyarthria.  Strong head turn L; able to turn head/track L although unable to sustain. AROM RLE (degrees)  RLE AROM: WFL  AROM LLE (degrees)  LLE General AROM: No Active. AROM RUE (degrees)  RUE AROM : WFL  AROM LUE (degrees)  LUE General AROM: No Active. Strength RLE  Strength RLE: WFL  Strength LLE  Comment: No Active throughout. Strength RUE  Strength RUE: WFL  Strength LUE  Comment: No Active throughout. Bed mobility  Rolling to Left: Dependent/Total  Rolling to Right: Dependent/Total  Bed Mobility Comments: Rolling R/L dependent for care/place of lift pad. Transfers  Bed to Chair: Dependent/Total (Via Maxi-Move.)        Balance  Comments: Semi-Reclined in bed : ongoing lean R. Attempt Unsupported sitting (following arrival at chair) : dependent. AM-PAC Score  AM-PAC Inpatient Mobility Raw Score : 7 (08/21/22 1127)  AM-PAC Inpatient T-Scale Score : 26.42 (08/21/22 1127)  Mobility Inpatient CMS 0-100% Score: 92.36 (08/21/22 1127)  Mobility Inpatient CMS G-Code Modifier : CM (08/21/22 1127)            Goals  Short Term Goals  Time Frame for Short term goals: Upon d/c acute care setting. Short term goal 1: Bed Mob Mod assist x 2. Short term goal 2: EOB ~ 5 min with Mod assist.  Short term goal 3: Transfers via Stedy Mod assist x 2. Short term goal 4: Pt participating approp Ex/Neuro Musc Activity. Patient Goals   Patient goals : Get better/be able to walk again; return home. Education  Patient Education  Education Given To: Patient; Family  Education Provided Comments: Role of PT, POC, Need to call for assist, OOB via Maxi-Move.   Education Method: Verbal  Barriers to Learning:  (Diminished insight.)  Education Outcome: Continued education needed      Therapy Time   Individual Concurrent Group Co-treatment   Time In 0900         Time Out [address]         Minutes [address], PT

## 2022-08-22 ENCOUNTER — APPOINTMENT (OUTPATIENT)
Dept: MRI IMAGING | Age: 72
DRG: 065 | End: 2022-08-22
Payer: MEDICARE

## 2022-08-22 LAB
ANION GAP SERPL CALCULATED.3IONS-SCNC: 12 MMOL/L (ref 3–16)
BASOPHILS ABSOLUTE: 0 K/UL (ref 0–0.2)
BASOPHILS RELATIVE PERCENT: 0.5 %
BUN BLDV-MCNC: 9 MG/DL (ref 7–20)
CALCIUM SERPL-MCNC: 9.1 MG/DL (ref 8.3–10.6)
CHLORIDE BLD-SCNC: 106 MMOL/L (ref 99–110)
CO2: 24 MMOL/L (ref 21–32)
CREAT SERPL-MCNC: <0.5 MG/DL (ref 0.6–1.2)
EKG DIAGNOSIS: NORMAL
EKG Q-T INTERVAL: 358 MS
EKG QRS DURATION: 80 MS
EKG QTC CALCULATION (BAZETT): 433 MS
EKG R AXIS: 18 DEGREES
EKG T AXIS: 13 DEGREES
EKG VENTRICULAR RATE: 88 BPM
EOSINOPHILS ABSOLUTE: 0.1 K/UL (ref 0–0.6)
EOSINOPHILS RELATIVE PERCENT: 2.1 %
GFR AFRICAN AMERICAN: >60
GFR NON-AFRICAN AMERICAN: >60
GLUCOSE BLD-MCNC: 91 MG/DL (ref 70–99)
GLUCOSE BLD-MCNC: 98 MG/DL (ref 70–99)
HCT VFR BLD CALC: 37.3 % (ref 36–48)
HEMOGLOBIN: 12.8 G/DL (ref 12–16)
LV EF: 60 %
LVEF MODALITY: NORMAL
LYMPHOCYTES ABSOLUTE: 0.5 K/UL (ref 1–5.1)
LYMPHOCYTES RELATIVE PERCENT: 11.2 %
MCH RBC QN AUTO: 29.6 PG (ref 26–34)
MCHC RBC AUTO-ENTMCNC: 34.2 G/DL (ref 31–36)
MCV RBC AUTO: 86.6 FL (ref 80–100)
MONOCYTES ABSOLUTE: 0.4 K/UL (ref 0–1.3)
MONOCYTES RELATIVE PERCENT: 9.1 %
NEUTROPHILS ABSOLUTE: 3.2 K/UL (ref 1.7–7.7)
NEUTROPHILS RELATIVE PERCENT: 77.1 %
PDW BLD-RTO: 15.4 % (ref 12.4–15.4)
PERFORMED ON: NORMAL
PLATELET # BLD: 195 K/UL (ref 135–450)
PMV BLD AUTO: 8 FL (ref 5–10.5)
POTASSIUM REFLEX MAGNESIUM: 3.9 MMOL/L (ref 3.5–5.1)
RBC # BLD: 4.31 M/UL (ref 4–5.2)
SODIUM BLD-SCNC: 142 MMOL/L (ref 136–145)
WBC # BLD: 4.1 K/UL (ref 4–11)

## 2022-08-22 PROCEDURE — 93010 ELECTROCARDIOGRAM REPORT: CPT | Performed by: INTERNAL MEDICINE

## 2022-08-22 PROCEDURE — 6360000002 HC RX W HCPCS: Performed by: NURSE PRACTITIONER

## 2022-08-22 PROCEDURE — 99233 SBSQ HOSP IP/OBS HIGH 50: CPT | Performed by: NURSE PRACTITIONER

## 2022-08-22 PROCEDURE — 36415 COLL VENOUS BLD VENIPUNCTURE: CPT

## 2022-08-22 PROCEDURE — 80048 BASIC METABOLIC PNL TOTAL CA: CPT

## 2022-08-22 PROCEDURE — 92610 EVALUATE SWALLOWING FUNCTION: CPT

## 2022-08-22 PROCEDURE — 85025 COMPLETE CBC W/AUTO DIFF WBC: CPT

## 2022-08-22 PROCEDURE — 97530 THERAPEUTIC ACTIVITIES: CPT

## 2022-08-22 PROCEDURE — 70551 MRI BRAIN STEM W/O DYE: CPT

## 2022-08-22 PROCEDURE — 97166 OT EVAL MOD COMPLEX 45 MIN: CPT

## 2022-08-22 PROCEDURE — 94760 N-INVAS EAR/PLS OXIMETRY 1: CPT

## 2022-08-22 PROCEDURE — 92523 SPEECH SOUND LANG COMPREHEN: CPT

## 2022-08-22 PROCEDURE — 97110 THERAPEUTIC EXERCISES: CPT

## 2022-08-22 PROCEDURE — 6370000000 HC RX 637 (ALT 250 FOR IP): Performed by: NURSE PRACTITIONER

## 2022-08-22 PROCEDURE — 93306 TTE W/DOPPLER COMPLETE: CPT

## 2022-08-22 PROCEDURE — 2060000000 HC ICU INTERMEDIATE R&B

## 2022-08-22 RX ORDER — METOPROLOL SUCCINATE 25 MG/1
25 TABLET, EXTENDED RELEASE ORAL DAILY
Status: DISCONTINUED | OUTPATIENT
Start: 2022-08-22 | End: 2022-08-23

## 2022-08-22 RX ORDER — LORAZEPAM 2 MG/ML
0.5 INJECTION INTRAMUSCULAR ONCE
Status: COMPLETED | OUTPATIENT
Start: 2022-08-22 | End: 2022-08-22

## 2022-08-22 RX ADMIN — CLOPIDOGREL BISULFATE 75 MG: 75 TABLET ORAL at 15:02

## 2022-08-22 RX ADMIN — ATORVASTATIN CALCIUM 80 MG: 80 TABLET, FILM COATED ORAL at 23:04

## 2022-08-22 RX ADMIN — Medication 0.5 MG: at 11:47

## 2022-08-22 RX ADMIN — METOPROLOL SUCCINATE 25 MG: 25 TABLET, EXTENDED RELEASE ORAL at 15:02

## 2022-08-22 NOTE — PROGRESS NOTES
;Decreased balance;Decreased ADL status; Decreased high-level IADLs;Decreased endurance;Decreased ROM; Decreased strength;Decreased fine motor control;Decreased safe awareness;Decreased cognition;Decreased coordination  Assessment: Veronique Bonner is a 66 y.o. female who presents to the emergency department with EMS for evaluation of worsening left-sided weakness over the past 4 days. Patient was seen on 8/16/2022 in the emergency room and diagnosed with TIA as well as atrial fibrillation. Recommendation was for admission at that time but patient signed out AMA. Patient states that since she been home she has been having progressive left-sided weakness and is now no longer able to move her left upper lower extremity. Patient also has profound left-sided facial droop. Patient denies any numbness or weakness in the right side of the body. States she does have a headache. PTA pt Ind with mobility and ADLs. Pt currently functioning significantly below baseline with noted flaccid L side. Pt currently requires Min A x2 and use of stedy for transfers. Anticipate pt needing up to Max A for ADLs. Pt will benefit from skilled OT sevices at this time. Anticipate pt with need for ongoing OT at time of D/C. Anticipate pt with need for ongoing OT at time of D/C.   Prognosis: Fair  Decision Making: Medium Complexity  Exam: see above  Assistance / Modification: stedy  REQUIRES OT FOLLOW-UP: Yes  Activity Tolerance  Activity Tolerance: Patient Tolerated treatment well;Patient limited by fatigue        Plan   Plan  Times per Week: 3-5x  Current Treatment Recommendations: Strengthening, ROM, Balance training, Functional mobility training, Endurance training, Patient/Caregiver education & training, Neuromuscular re-education, Safety education & training, Self-Care / ADL, Equipment evaluation, education, & procurement     Restrictions  Restrictions/Precautions  Restrictions/Precautions: NPO, Fall Risk  Position Activity Restriction  Other position/activity restrictions: L Hemiparesis. Subjective   General  Chart Reviewed: Yes  Patient assessed for rehabilitation services?: Yes  Additional Pertinent Hx: per ED note, Ramon Sabillon is a 66 y.o. female who presents to the emergency department with EMS for evaluation of worsening left-sided weakness over the past 4 days. Patient was seen on 8/16/2022 in the emergency room and diagnosed with TIA as well as atrial fibrillation. Recommendation was for admission at that time but patient signed out AMA. Patient states that since she been home she has been having progressive left-sided weakness and is now no longer able to move her left upper lower extremity. Patient also has profound left-sided facial droop. Patient denies any numbness or weakness in the right side of the body. States she does have a headache. Family / Caregiver Present: No  Referring Practitioner: LEDY Vazquez CNP  Subjective  Subjective: Pt agreeable to OT evaluation. Pt reports some pain in L ankle with no number stated. General Comment  Comments: okay for therapy per RN. Social/Functional History  Social/Functional History  Lives With:  Ladonna Wan ([place] Patient, 33 yr, \"works 3 jobs\"). )  Type of Home: House  Home Layout: Multi-level, Able to Live on Main level with bedroom/bathroom, Laundry in basement  Home Access: Stairs to enter with rails (4-5 steps to enter.)  Bathroom Shower/Tub: Tub/Shower unit  Bathroom Toilet: Standard  Bathroom Equipment:  (No DME pta.)  Bathroom Accessibility: Accessible  Home Equipment: Manisha [place], standard (Family obtained Stand Manisha [place] following return home from ER (8/16/2022). 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 (08/22/22 1450)  AM-PAC Inpatient ADL T-Scale Score : 32.03 (08/22/22 1450)  ADL Inpatient CMS 0-100% Score: 63.03 (08/22/22 1450)  ADL Inpatient CMS G-Code Modifier : CL (08/22/22 1450)    Tinneti Score       Goals  Short Term Goals  Time Frame for Short term goals: prior to D/C  Short Term Goal 1: complete bed mobility with Min A  Short Term Goal 2: complete transfers with Min A  Short Term Goal 3: complete bathing and dressing with Mod A  Short Term Goal 4: complete toileting with Mod A  Short Term Goal 5: complete grooming with setup  Long Term Goals  Time Frame for Long term goals : STG=LTG  Patient Goals   Patient goals : increase independence       Therapy Time   Individual Concurrent Group Co-treatment   Time In 1320         Time Out 1345         Minutes 25         Timed Code Treatment Minutes: 10 Minutes (15 minute eval)       FLORENTIN Pate/L

## 2022-08-22 NOTE — CARE COORDINATION
INITIAL CASE MANAGEMENT ASSESSMENT    Reviewed chart, met with patient & daughter Rekha Bui to assess possible discharge needs. Explained Case Management role/services. Living Situation: Confirmed address, lives with grandson in a 2 story house with basement. 4-6 steps to enter depending on which entrance she enters. ADLs: Independent     DME: None    PT/OT Recs: Discharge Recommendations:  Continue to assess pending progress, IP Rehab   PT Equipment Recommendations  Other: Defer to next level of care. Radha Segura scored a 7/24 on the AM-PAC short mobility form. Current research shows that an AM-PAC score of 17 or less is typically not associated with a discharge to the patient's home setting. Based on the patient's AM-PAC score and their current functional mobility deficits, it is recommended that the patient have 5-7 sessions per week of Physical Therapy at d/c to increase the patient's independence. At this time, this patient demonstrates complex nursing, medical, and rehabilitative needs, and would benefit from intensive rehabilitation services upon discharge from the Inpatient setting. This patient demonstrates the ability to participate in and benefit from an intensive therapy program with a coordinated interdisciplinary team approach to foster frequent, structured, and documented communication among disciplines, who will work together to establish, prioritize, and achieve treatment goals. Please see assessment section for further patient specific details. Active Services: None     Transportation: Active      Medications: Uses [facility] [address] -- no issues obtaining/affording medications. Insurance: Daughter believes insurance changed to [employer] (FT#[id number] DXABQ#[id number]). [place] in [place] [address] notified of change in insurance for pre cert purposes.     PCP: Janeth Beck -- MD name unknown      HD/PD: n/a    PLAN/COMMENTS: Patient wants to return home however patient has significant

## 2022-08-22 NOTE — PROGRESS NOTES
Cardiac Electrophysiology Progress Note     Admit Date: 2022     Reason for follow up: atrial fibrillation    HPI and Interval History:   Patient is a 66 y.o. female who presents to the emergency department with EMS for evaluation of worsening left-sided weakness over the past 4 days. Patient was seen on 2022 in the emergency room and diagnosed with TIA as well as atrial fibrillation. Recommendation was for admission at that time but patient signed out AMA. Patient states that since she been home she has been having progressive left-sided weakness and is now no longer able to move her left upper lower extremity. Patient also has profound left-sided facial droop. Patient denies any numbness or weakness in the right side of the body. States she does have a headache. CT suggests a CVA. Her cxr reveals an apical mass. Remains in atrial fibrillation, rates uncontrolled at times. Denies any palpitations or dyspnea. Daughter at bedside. Physical Examination:  Vitals:    22 0915   BP:    Pulse: (!) 102   Resp: 13   Temp:    SpO2: 98%        Intake/Output Summary (Last 24 hours) at 2022 1219  Last data filed at 2022 2200  Gross per 24 hour   Intake --   Output 1150 ml   Net -1150 ml     In: -   Out: 1150    Wt Readings from Last 3 Encounters:   22 139 lb 12.4 oz (63.4 kg)   22 139 lb 12.4 oz (63.4 kg)   22 150 lb 5.7 oz (68.2 kg)     Temp  Av.4 °F (36.9 °C)  Min: 98.3 °F (36.8 °C)  Max: 98.6 °F (37 °C)  Pulse  Av.3  Min: 78  Max: 102  BP  Min: 153/85  Max: 168/80  SpO2  Av %  Min: 98 %  Max: 98 %    Telemetry: Atrial fibrillation v-rates 100s. Constitutional: Alert, in no acute distress. Appears stated age. Head: Normocephalic and atraumatic. Eyes: Conjunctivae normal. EOM are normal.   Neck: Neck supple. No lymphadenopathy. No rigidity. No JVD present. Cardiovascular: Fast rate, IRR. No murmurs, rubs or gallops.  No S3 or S4.  Pulmonary/Chest: Clear breath sounds bilaterally. No crackles, wheezes or rhonchi. No respiratory accessory muscle use. Abdominal: Soft. Normal bowel sounds present. No distension, No tenderness. Musculoskeletal: No tenderness. No edema    Lymphadenopathy: Has no cervical adenopathy. Neurological: Alert and oriented. L facial droop, L hemiparesis. Skin: Skin is warm and dry. No rash, lesions, ulcerations noted. Psychiatric: No anxiety or agitation. Labs, diagnostic and imaging results reviewed. Reviewed. Recent Labs     2248 22  0459    143 142   K 3.8 3.8 3.9    108 106   CO2 24 25 24   BUN 12 13 9   CREATININE 0.5* 0.5* <0.5*     Recent Labs     2248 229   WBC 5.6 4.1 4.1   HGB 12.2 11.1* 12.8   HCT 36.8 33.5* 37.3   MCV 87.1 87.5 86.6    182 195     Lab Results   Component Value Date/Time    TROPONINI <0.01 2022 07:59 PM     Estimated Creatinine Clearance: 94 mL/min (based on SCr of 0.5 mg/dL). No results found for: BNP  Lab Results   Component Value Date/Time    PROTIME 14.5 2022 07:59 PM    PROTIME 12.9 2022 06:17 PM    INR 1.13 2022 07:59 PM    INR 0.98 2022 06:17 PM     Lab Results   Component Value Date/Time    CHOL 208 2022 06:48 AM    HDL 59 2022 06:48 AM    TRIG 61 2022 06:48 AM       Scheduled Meds:   atorvastatin  80 mg Oral Nightly    [Held by provider] apixaban  5 mg Oral BID    clopidogrel  75 mg Oral Daily     Continuous Infusions:  PRN Meds:ondansetron **OR** ondansetron, polyethylene glycol, perflutren lipid microspheres     EC22  Atrial fibrillation at 88 BPM.     Echo:22  Pending.     Assessment and Plan:     New atrial fibrillation   - first noted on EKG on 22   - would add low dose Toprol for rate control as rate a little fast at times   - CHADS2-VASc at least 5 (age, gender, HTN, CVA) - started on Eliquis on  but was only taking it once

## 2022-08-22 NOTE — ACP (ADVANCE CARE PLANNING)
Advance Care Planning     Advance Care Planning Activator (Inpatient)  Conversation Note      Date of ACP Conversation: 8/22/2022     Conversation Conducted with: Patient with Decision Making Capacity    ACP Activator: Delmis Mchugh RN      Health Care Decision Maker:     Current Designated Health Care Decision Maker:     Primary Decision Maker: myrtle alvarado - Child - [phone]    Primary Decision Maker: Josh Smyth Child - [phone]    Care Preferences    Ventilation: \"If you were in your present state of health and suddenly became very ill and were unable to breathe on your own, what would your preference be about the use of a ventilator (breathing machine) if it were available to you? \"      Would the patient desire the use of ventilator (breathing machine)?: no    \"If your health worsens and it becomes clear that your chance of recovery is unlikely, what would your preference be about the use of a ventilator (breathing machine) if it were available to you? \"     Would the patient desire the use of ventilator (breathing machine)?: No      Resuscitation  \"CPR works best to restart the heart when there is a sudden event, like a heart attack, in someone who is otherwise healthy. Unfortunately, CPR does not typically restart the heart for people who have serious health conditions or who are very sick. \"    \"In the event your heart stopped as a result of an underlying serious health condition, would you want attempts to be made to restart your heart (answer \"yes\" for attempt to resuscitate) or would you prefer a natural death (answer \"no\" for do not attempt to resuscitate)? \" no       [] Yes   [x] No   Educated Patient / Lay Hyatt regarding differences between Advance Directives and portable DNR orders.     Length of ACP Conversation in minutes:  5 minutes    Conversation Outcomes:  [x] ACP discussion completed  [] Existing advance directive reviewed with patient; no changes to patient's previously recorded wishes  [] New Advance Directive completed  [] Portable Do Not Rescitate prepared for Provider review and signature  [] POLST/POST/MOLST/MOST prepared for Provider review and signature      Follow-up plan:    [x] Schedule follow-up conversation to continue planning. Messaged MD to discuss and update code status. Patient doesn't want CPR/intubation.   [] Referred individual to Provider for additional questions/concerns   [] Advised patient/agent/surrogate to review completed ACP document and update if needed with changes in condition, patient preferences or care setting    [] This note routed to one or more involved healthcare providers  Electronically signed by Mickey Mendosa RN Case Management [phone] on 8/22/2022 at 1:13 PM

## 2022-08-22 NOTE — ACP (ADVANCE CARE PLANNING)
Advanced Care Planning Note. Purpose of Encounter: Advanced care planning in light of acute cva  Parties In Attendance: Patient,  daughter Arsalan Joseph: Yes  Subjective: Pt presented with stroke symptoms and left side paralysis  Objective: MRI brain confirmed acute right cva. Ekg shows afib  Goals of Care Determination: Pt reports in the event of cardiac or respiratory arrest she does not want to receive chest compressions, intubation, defibrillation/cardioversion. Pt is agreeable to ACLS medications.    Plan:  pursue rehab for cva  Code Status: DNR CCA do not intubate  Time spent on Advanced care Plannin min  Advanced Care Planning Documents: none  LEDY Roa CNP  2022 2:42 PM

## 2022-08-22 NOTE — PROGRESS NOTES
symptoms occur is emphasized . The notebook also provides education on Stroke community resources and stroke advocacy. The need for follow-up after discharge was highlighted with patient/family with them being able to repeat understanding of the importance of this.       Electronically signed by Jenna Moses RN on 8/22/2022 at 4:11 AM

## 2022-08-22 NOTE — PROGRESS NOTES
Staff to mri to give ativan  pt refusing mri,  called myrtle alvarado  [phone]  she talked with pt ,  then called  kamilah lowery  [phone]  here in hospital  to come to mri to talk with mother   about mri

## 2022-08-22 NOTE — PROGRESS NOTES
Physical Therapy  [facility]  Daily Treatment Note - COTX  NAME: Jenniffer Salmon  : 1950  MRN: [id number]    Date of Service: 2022    Discharge Recommendations:  Continue to assess pending progress, IP Rehab, Subacute/Skilled Nursing Facility        Patient Diagnosis(es): The primary encounter diagnosis was Cerebrovascular accident (CVA) due to occlusion of right middle cerebral artery ([address] Diagnoses of Left hemiparesis ([address] and Facial droop were also pertinent to this visit. Assessment   Assessment: Pt able to stand to stedy with Min A x 2, and maintain standing balance at stedy with Min A x 1. She remains flaccid in LUE/LLE, but is agreeable to participating in therapy. HR to 160s with standing activity, and pt limited by fatigue. She would benefit from continued therapy to maximize her strength, balance, endurance, safety awareness, and independence completing mobility tasks. PT recommends either high-frequency, or low-moderate frequency therapy upon D/C. Jenniffer Salmon scored a 9/24 on the AM-PAC short mobility form. Current research shows that an AM-PAC score of 17 or less is typically not associated with a discharge to the patient's home setting. Based on the patient's AM-PAC score and their current functional mobility deficits, it is recommended that the patient have 5-7 sessions per week of Physical Therapy at d/c to increase the patient's independence. At this time, this patient demonstrates complex nursing, medical, and rehabilitative needs, and would benefit from intensive rehabilitation services upon discharge from the Inpatient setting.   This patient demonstrates the ability to participate in and benefit from an intensive therapy program with a coordinated interdisciplinary team approach to foster frequent, structured, and documented communication among disciplines, who will work together to establish, prioritize, and achieve treatment goals. Please see assessment section for further patient specific details. If patient discharges prior to next session this note will serve as a discharge summary. Please see below for the latest assessment towards goals. Plan    Plan  Plan: 3-5 times per week  Current Treatment Recommendations: Strengthening;Balance training;Functional mobility training;Transfer training;Neuromuscular re-education; Safety education & training;Patient/Caregiver education & training;ADL/Self-care training;Gait training; Wheelchair mobility training; Endurance training;Home exercise program;Equipment evaluation, education, & procurement     Restrictions  Restrictions/Precautions  Restrictions/Precautions: NPO, Fall Risk  Position Activity Restriction  Other position/activity restrictions: L Hemiparesis. Subjective    Subjective  Subjective: Pt continues to report minimal movement LUE/LLE. PT and OT did observe pt able to initiate slight L shoulder shrug. Orientation  Overall Orientation Status: Within Functional Limits     Objective      Bed Mobility Training  Bed Mobility Training: Yes  Rolling: Minimum assistance;Assist X2    Balance  Sitting: With support  Standing: With support    Transfer Training  Transfer Training: Yes  Sit to Stand: Minimum assistance;Assist X2  Stand to Sit: Minimum assistance;Assist X2  Bed to Chair: Assist X2;Total assistance Linsey Sethi)    Gait Training  Gait Training: No    Neuromuscular Education  Neuromuscular Comments: Pt able to sit EOB x 3 min. with Min A, intermittent trunk/RUE support, cues to avoid LOB to L. Pt able to stand at stedy x 3 trials with Min A for trunk control, cues to increase anterior weight shift, and achieve erect posture. Min A required to correct posterolateral lean to L. PT Exercises  Exercise Treatment: PT educated pt on isometric exercises (Hip ADD, glute squeeze, TrA isometric; all 10 x 5 s. hold if possible). Pt and her daughter verbalized understanding. Safety Devices  Type of Devices: All fall risk precautions in place;Call light within reach; Chair alarm in place;Gait belt;Left in chair;Nurse notified  Restraints  Restraints Initially in Place: No     Goals  Short Term Goals  Time Frame for Short term goals: Upon d/c acute care setting. Short term goal 1: Bed Mob Mod assist x 2. Short term goal 2: EOB ~ 5 min with Mod assist.  Short term goal 3: Transfers via Stedy Mod assist x 2. Short term goal 4: Pt participating approp Ex/Neuro Musc Activity. Patient Goals   Patient goals : Get better/be able to walk again; return home. Education  Patient Education  Education Given To: Patient; Family  Education Provided Comments: Role of PT, POC, Need to call for assist, OOB via stedy with staff, importance of moving Kervin Ghosh as able to maximize recovery, benefits of ARU vs SNF, importance of having adequate care to reduce risk of personal injury.   Education Method: Verbal;Demonstration  Barriers to Learning: Cognition;Vision  Education Outcome: Continued education needed    Therapy Time   Individual Concurrent Group Co-treatment   Time In 5015     0711   Time Out 9123     6035   Minutes 15     25   Timed Code Treatment Minutes: 65 [facility]       Baldemar Morales PT   Electronically signed by Baldemar Morales PT [id number] on 8/22/2022 at 2:10 PM

## 2022-08-22 NOTE — PROGRESS NOTES
Unsure of insurance provider so I went onto [employer] portal and found that patient's primary insurance is in fact [employer]. If agreeable to ARU patient would require a precert.

## 2022-08-22 NOTE — PROGRESS NOTES
Hospitalist Progress Note      PCP: No primary care provider on file. Date of Admission: 8/20/2022    Chief Complaint: worsening left sided weakness    Hospital Course: 66 y.o. female with recent diagnosis of a-fib presented to [facility] ED with worsening left upper and lower ext weakness. Pt was seen in the ED 8/16/22 and was found to be in a-fib. She also had mild weakness to left lower ext and facial droop. Stroke team was consulted but pt not a candidate for thrombolytic therapy as she had mild sx. She refused to stay for further evaluation and monitoring. She did agree to start rx of eliquis and atenolol. She has since progressed and now is unable to stand/walk. She has profound facial droop. She is not able to move left upper or lower extremity at all. No numbness or sensory change. CT head today shows likely acute infarct within the right mid corona radiata. Subjective: Pt up to chair, left side flaccid. Pt thinking about rehab. Daughter at bedside. Denies cp sob palpitaitons, abd pain, headache, vision change. Reviewed POC, denies needs. Discussed code status, chose [address] do not intubate for now. Assessment/Plan:    Acute CVA  - with symptoms: left side paralysis  - out of the tPA window  - head CT: likely acute infarct within the right mid corona radiata  - CTA head/neck: No flow limiting stenosis of head or neck, No LVO  -MRI brain with   acute infarct involving the right posterior frontal corona   radiata extending along the right basal ganglia. No mass effect or midline   shift. -ECHO pending  - plavix, statin  - consult neurology- recommend hold eliquis until stroke volume is known. Recommend plavix and not asa as pt has had adverse effect from asa  -per neurology- ok to resume [facility] [place], operated by [facility] tomorrow, 8/23/22.  Would stop plavix when starting AC.   - check lipids, HBA1c  -pt/ot/speech  -sw for dc planning- PMR consult placed  -neuro checks     Atrial Fibrillation   - currently rate controlled  -AC on hold. Eliquis is too expensive. Family to discuss, but were leaning towards warfarin due to cost  -HR increased today, metoprolol ordered   -tele  -cardiology consult  -echo pending     Right apical lung mass  -seen on cxr, 6.9 cm mass or masslike consolidation within the medial right lung apex. -recommend ct of chest for further evaluation.  -discussed with daughter Hang Smith, who thinks patient will likely not want to pursue workup for the mass. Hang Smith will discuss it with patient and her sister Jadyn Adamson, and let us know tomorrow if they want to pursue a cat scan or not. Active Hospital Problems    Diagnosis     Paroxysmal atrial fibrillation (HCC) [I48.0]      Priority: Medium    Mass of upper lobe of right lung [R91.8]      Priority: Medium    Acute cerebrovascular accident (CVA) due to occlusion of right middle cerebral artery (HCC) [I63.511]      Priority: Medium       Medications:  Reviewed    Infusion Medications   Scheduled Medications    metoprolol succinate  25 mg Oral Daily    atorvastatin  80 mg Oral Nightly    [Held by provider] apixaban  5 mg Oral BID    clopidogrel  75 mg Oral Daily     PRN Meds: ondansetron **OR** ondansetron, polyethylene glycol, perflutren lipid microspheres      Intake/Output Summary (Last 24 hours) at 8/22/2022 1429  Last data filed at 8/21/2022 2200  Gross per 24 hour   Intake --   Output 1150 ml   Net -1150 ml       Physical Exam Performed:    BP (!) 164/97   Pulse (!) 102   Temp 98.3 °F (36.8 °C) (Axillary)   Resp 13   Ht 5' 6\" (1.676 m)   Wt 139 lb 12.4 oz (63.4 kg)   SpO2 98%   BMI 22.56 kg/m²     General appearance: No apparent distress, appears stated age and cooperative. HEENT: Pupils equal, round, and reactive to light. Conjunctivae/corneas clear. Neck: Supple, with full range of motion. No jugular venous distention. Trachea midline. Respiratory:  Normal respiratory effort.  Clear to auscultation, bilaterally without Rales/Wheezes/Rhonchi. Cardiovascular: Regular rate and rhythm with normal S1/S2 without murmurs, rubs or gallops. Abdomen: Soft, non-tender, non-distended with normal bowel sounds. Musculoskeletal: No clubbing, cyanosis or edema bilaterally. Full range of motion without deformity. Skin: Skin color, texture, turgor normal.  No rashes or lesions. Neurologic:  left facial droop, left arm and leg flaccid  Psychiatric: Alert and oriented, thought content appropriate, normal insight  Capillary Refill: Brisk,< 3 seconds   Peripheral Pulses: +2 palpable, equal bilaterally       Labs:   Recent Labs     08/20/22 1959 08/21/22  0648 08/22/22  0459   WBC 5.6 4.1 4.1   HGB 12.2 11.1* 12.8   HCT 36.8 33.5* 37.3    182 195     Recent Labs     08/20/22 1959 08/21/22  0648 08/22/22  0459    143 142   K 3.8 3.8 3.9    108 106   CO2 24 25 24   BUN 12 13 9   CREATININE 0.5* 0.5* <0.5*   CALCIUM 9.3 9.0 9.1     Recent Labs     08/20/22 1959   AST 17   ALT <5*   BILITOT 0.6   ALKPHOS 137*     Recent Labs     08/20/22 1959   INR 1.13     Recent Labs     08/20/22 1959   TROPONINI <0.01       Urinalysis:    No results found for: Layvonne Lease, BACTERIA, RBCUA, BLOODU, [address]    Radiology:  MRI brain without contrast   Final Result   1. There is an acute infarct involving the right posterior frontal corona   radiata extending along the right basal ganglia. No mass effect or midline   shift. 2. Otherwise, no acute intracranial abnormality. 3. Mild global parenchymal volume loss with mild chronic microvascular   ischemic changes. 4. Aerated secretions are seen in the left sphenoid sinus. CTA HEAD NECK W CONTRAST   Final Result   No significant interval change compared to the prior exam from 08/16/2022. No large vessel occlusion visualized.       RECOMMENDATIONS:   Unavailable         CT HEAD WO CONTRAST   Final Result   Addendum (preliminary) 1 of 1   ADDENDUM:   Findings were

## 2022-08-22 NOTE — PROGRESS NOTES
CHIEF COMPLAINT  Left-sided axial low back pain    Subjective   History of Present Illness   Amarilis Hernandez is a 80 y.o. male.   He presents to the office for evaluation of chronic low back pain. He was referred here by Henry Yeung MD  He states the pain began about a year ago and has been fairly constant since that time.  He describes the pain as a sharp, stabbing sensation especially when he is physically active and moving around.  He states that at rest the pain is minimal.  States he is tried physical therapy as well as over-the-counter NSAIDs without significant pain relief.  He was referred by his primary care provider for nonsurgical options..     Location: Low back, axial left back  Onset: 1 year ago  Duration: The pain is constant, progressively worsening  Timing: The pain is intermittent, primarily with physical activities  Quality: Dull aching sensation with occasional sharp stabbing pains  Severity: Today: 5       Last Week: 6       Worst: 6  Modifying Factors: The pain is worse with physical activity and bending and moving side to side.  The pain is improved with rest and lying flat    Physical Therapy: He states he is tried physical therapy without significant relief      Current Outpatient Medications:   •  benazepril (LOTENSIN) 20 MG tablet, TAKE 1 TABLET DAILY, Disp: 90 tablet, Rfl: 4  •  bisoprolol-hydrochlorothiazide (ZIAC) 5-6.25 MG per tablet, TAKE 1 TABLET DAILY, Disp: 90 tablet, Rfl: 4  •  gabapentin (NEURONTIN) 800 MG tablet, Take 1 tablet by mouth 3 (Three) Times a Day As Needed (as needed for pain). for pain, Disp: 180 tablet, Rfl: 1  •  meclizine (ANTIVERT) 25 MG tablet, TAKE 1 TO 2 TABLETS EVERY 6 HOURS AS NEEDED FOR DIZZINESS, Disp: 90 tablet, Rfl: 1  •  nystatin-triamcinolone (MYCOLOG II) 939585-8.1 UNIT/GM-% cream, NYSTATIN-TRIAMCINOLONE 268960-5.1 UNIT/GM-% CREA, Disp: , Rfl:   •  simvastatin (ZOCOR) 20 MG tablet, TAKE 1 TABLET DAILY, Disp: 90 tablet, Rfl: 4  •   Progress Note    Updates  No clinical changes. Current Facility-Administered Medications:     metoprolol succinate (TOPROL XL) extended release tablet 25 mg, 25 mg, Oral, Daily, LEDY Head CNP    ondansetron (ZOFRAN-ODT) disintegrating tablet 4 mg, 4 mg, Oral, Q8H PRN **OR** ondansetron (ZOFRAN) injection 4 mg, 4 mg, IntraVENous, Q6H PRN, LEDY Hernandez CNP    polyethylene glycol (GLYCOLAX) packet 17 g, 17 g, Oral, Daily PRN, LEDY Hernandez CNP    perflutren lipid microspheres (DEFINITY) injection 1.65 mg, 1.5 mL, IntraVENous, ONCE PRN, LEDY Hernandez CNP    atorvastatin (LIPITOR) tablet 80 mg, 80 mg, Oral, Nightly, LEDY Hernandez CNP, 80 mg at 08/21/22 2144    [Held by provider] apixaban (ELIQUIS) tablet 5 mg, 5 mg, Oral, BID, LEDY Hernandez CNP, 5 mg at 08/21/22 2725    clopidogrel (PLAVIX) tablet 75 mg, 75 mg, Oral, Daily, LEDY Hernandez CNP, 75 mg at 08/21/22 0836    Exam  Blood pressure (!) 164/97, pulse (!) 102, temperature 98.3 °F (36.8 °C), temperature source Axillary, resp. rate 13, height 5' 6\" (1.676 m), weight 139 lb 12.4 oz (63.4 kg), SpO2 98 %. Constitutional    Vital signs: BP, HR, and RR reviewed   General alert, no distress  Eyes: unable to visualize the fundi  Cardiovascular: no peripheral edema. Psychiatric: cooperative with examination, no psychotic behavior noted. Neurologic  Mental status:   orientation to person, place. Attention intact as able to attend well to the exam     Language fluent in conversation   Comprehension intact; follows simple commands  Cranial nerves:   CN2: visual fields full   CN 3,4,6: R gaze deviation. Can cross midline though not fully to the L. Pupils are equal, round, reactive bilaterally. CN7: L facial weakness w/ dysarthria. CN8: hearing grossly intact  CN12: tongue midline with protrusion  Strength: L hemiplegia. Good strength in RUE/RLE.     Sensory: light touch intact "tamsulosin (FLOMAX) 0.4 MG capsule 24 hr capsule, TAMSULOSIN HCL 0.4 MG CAPS, Disp: , Rfl:   •  diazePAM (Valium) 5 MG tablet, Take 1 po 30 min prior to MRI, may repeat x 1 if needed, Disp: 2 tablet, Rfl: 0  •  meloxicam (MOBIC) 7.5 MG tablet, Take 1 tablet by mouth Daily., Disp: 30 tablet, Rfl: 0    The following portions of the patient's history were reviewed and updated as appropriate: allergies, current medications, past family history, past medical history, past social history, past surgical history and problem list.    Pain Medication Reviewed: Yes      REVIEW OF PERTINENT MEDICAL DATA    Past Medical History:   Diagnosis Date   • Arthritis    • Hyperlipidemia    • Hypertension    • Low back pain      History reviewed. No pertinent surgical history.  Family History   Problem Relation Age of Onset   • Cancer Mother    • Heart disease Father    • Diabetes Other    • Hyperlipidemia Other    • Hypertension Other      Social History     Socioeconomic History   • Marital status:      Spouse name: Not on file   • Number of children: Not on file   • Years of education: Not on file   • Highest education level: Not on file   Tobacco Use   • Smoking status: Former Smoker   • Smokeless tobacco: Never Used   Substance and Sexual Activity   • Alcohol use: Never     Frequency: Never   • Drug use: Never   • Sexual activity: Defer     No Known Allergies        Review of Systems   HENT: Positive for hearing loss.    Musculoskeletal: Positive for back pain and gait problem.   Neurological: Negative for weakness and numbness.   All other systems reviewed and are negative.      Objective   Vitals:    08/17/20 1105   BP: 136/69   Pulse: 57   Resp: 16   Temp: 98.1 °F (36.7 °C)   SpO2: 97%   Weight: 102 kg (225 lb)   Height: 188 cm (74\")   PainSc:   5     Physical Exam   Constitutional: He is oriented to person, place, and time. He appears well-developed and well-nourished. No distress.   HENT:   Head: Normocephalic and " in all 4 extremities. Cerebellar/coordination: finger nose finger normal without ataxia in RUE. Tone: flaccid LUE/LLE. Gait: deferred at this time for safety given weakness. ROS  Constitutional- No weight loss or fevers  Eyes- No diplopia. No photophobia. Ears/nose/throat- No dysphagia. +Dysarthria  Cardiovascular- No palpitations. No chest pain  Respiratory- No dyspnea. No Cough  Gastrointestinal- No Abdominal pain. No Vomiting. Genitourinary- No incontinence. No urinary retention  Musculoskeletal- No myalgia. No arthralgia  Skin- No rash. No easy bruising. Psychiatric- No depression. No anxiety  Endocrine- No diabetes. No thyroid issues. Hematologic- No bleeding difficulty. No fatigue  Neurologic- + weakness. No Headache. Labs  HgA1c 5.2      Studies  MRI brain w/o 8/22/22, independently reviewed  Impression   1. There is an acute infarct involving the right posterior frontal corona   radiata extending along the right basal ganglia. No mass effect or midline   shift. 2. Otherwise, no acute intracranial abnormality. 3. Mild global parenchymal volume loss with mild chronic microvascular   ischemic changes. 4. Aerated secretions are seen in the left sphenoid sinus. CTA head/neck 8/20/22, independently reviewed  Impression   No significant interval change compared to the prior exam from 08/16/2022. No large vessel occlusion visualized. Possible FMD, mild cervical ICAs   Severe R PCA stenosis     Impression  L hemiplegia, dysarthria, and R gaze preference secondary to R subcortical infarct. New onset atrial fibrillation. Hyperlipidemia. Hypertension. PCA stenosis. R lung mass. Recommendations  OK to resume anticoagulation tomorrow. Eliquis is expensive for them. Perhaps there is a less expensive option to consider or resources that can help w/ cost.      Would stop Plavix when starting anticoagulation. High intensity statin.   LDL < 70.     BP < 140/90. Continue rehabilitation efforts. There is a TTE pending though probably unlikely to  at this point. She can follow up w/ Neurology after discharge/rehab arrangements. Discussed w/ Dr. Jose Sharpe and BRIAN Cassidy.      Please call w/ any questions or concerns. Thank you.      Nina Singletary NP  [address] [place] [address] [id number] Neurology    A copy of this note was provided for Dr Joce Ibanez MD atraumatic.   Right Ear: Hearing normal.   Left Ear: Hearing normal.   Eyes: Pupils are equal, round, and reactive to light. Conjunctivae and EOM are normal.   Neck: Normal range of motion. Neck supple. No tracheal deviation present.   Cardiovascular: Normal rate and regular rhythm.   No murmur heard.  Well-Perfused  No Edema   Pulmonary/Chest: Effort normal. No respiratory distress.   Stable on RA   Abdominal: Soft. There is no tenderness. There is no guarding.   Musculoskeletal:   Lumbar Spine Exam:  Tender to palpation over the lumbar paraspinal musculature left  Limited range of motion secondary to pain Yes  Facet loading positive: left  Facets tender to palpation: left  Straight leg raise test positive: Negative bilaterally         Neurological: He is alert and oriented to person, place, and time. No cranial nerve deficit.   Skin: Skin is warm and dry. Capillary refill takes less than 2 seconds. No rash noted.   Psychiatric: He has a normal mood and affect. His behavior is normal. Judgment and thought content normal.   Nursing note and vitals reviewed.      Imaging:  Mri Lumbar Spine Without Contrast    Result Date: 8/5/2020  Multilevel degenerative changes of lumbar spine as described above. Of note, there is disc bulge L2-3, there is suspected indication of the left L2 nerve root as it exits the foramen (series 2 image 36). Suspected severe left greater than right neural foraminal stenosis at L5-S1, moderate left and moderate to severe right neural foraminal stenosis at L4-5. Please refer to the body the report for level by level findings.   Electronically Signed By-Dr. Mony Alfaro MD On:8/5/2020 11:41 AM This report was finalized on [id number] by Dr. Mony Alfaro MD.       Assessment/Plan     Assessment: This is an 80-year-old gentleman with a one-year history of worsening low back pain, primarily on the left which makes it difficult for him to work outside.  He states that when he is working  outside or working in the yard he feels a pain in his left side without radiation.  He has tried physical therapy at the gym as well as nonsteroidals without significant pain relief.  He is tried chiropractic and was referred to neurosurgery, but would like to proceed with nonsurgical intervention.    Diagnosis/Plan:    1.  Lumbar spondylosis  2.  Degenerative disc disease    PLAN:  1.  We will schedule next available for left-sided L3-4, L4-5, L5-S1 medial branch block as I feel that his pain is primarily due to lumbar spondylosis and facet agenic pain  2.  While he does have scattered disc bulges on the MRI, I feel that his pain is primarily from the facet joints.  If he does not get any significant relief from the medial branch blocks, I will consider an epidural for him  3.  Pending the outcome of the medial branch blocks, may consider radiofrequency ablation in the future.  4.  It does not appear that he is taking an NSAID currently.  I will trial him on meloxicam 7.5 mg daily.  He does have a significant history of diabetes but his latest creatinine was within normal limits.      --- Follow-up next available for left-sided lumbar medial branch blocks           INSPECT REPORT    As part of the patient's treatment plan, I may be prescribing controlled substances. The patient has been made aware of appropriate use of such medications, including potential risk of somnolence, limited ability to drive and/or work safely, and the potential for dependence or overdose. It has also been made clear that these medications are for use by this patient only, without concomitant use of alcohol or other substances unless prescribed.     Patient has completed prescribing agreement detailing terms of continued prescribing of controlled substances, including monitoring INSPECT reports, urine drug screening, and pill counts if necessary. The patient is aware that inappropriate use will results in cessation of prescribing such  medications.    INSPECT report has been reviewed and scanned into the patient's chart.    As the clinician, I personally reviewed the INSPECT from 8/14/2020 while the patient was in the office today.    History and physical exam exhibit continued safe and appropriate use of controlled substances.         EMR Dragon/Transcription disclaimer:   Much of this encounter note is an electronic transcription/translation of spoken language to printed text. The electronic translation of spoken language may permit erroneous, or at times, nonsensical words or phrases to be inadvertently transcribed; Although I have reviewed the note for such errors, some may still exist.

## 2022-08-22 NOTE — PROGRESS NOTES
Occupational Therapy  Unable to see pt at this time due to pt off floor for testing at time of attempt. Will follow up with pt as time allows.     Rachel Cooper, OTR/L

## 2022-08-23 ENCOUNTER — APPOINTMENT (OUTPATIENT)
Dept: GENERAL RADIOLOGY | Age: 72
DRG: 065 | End: 2022-08-23
Payer: MEDICARE

## 2022-08-23 LAB
ANION GAP SERPL CALCULATED.3IONS-SCNC: 10 MMOL/L (ref 3–16)
BASOPHILS ABSOLUTE: 0 K/UL (ref 0–0.2)
BASOPHILS RELATIVE PERCENT: 0.4 %
BUN BLDV-MCNC: 15 MG/DL (ref 7–20)
CALCIUM SERPL-MCNC: 9 MG/DL (ref 8.3–10.6)
CHLORIDE BLD-SCNC: 104 MMOL/L (ref 99–110)
CO2: 25 MMOL/L (ref 21–32)
CREAT SERPL-MCNC: <0.5 MG/DL (ref 0.6–1.2)
EOSINOPHILS ABSOLUTE: 0.1 K/UL (ref 0–0.6)
EOSINOPHILS RELATIVE PERCENT: 1 %
GFR AFRICAN AMERICAN: >60
GFR NON-AFRICAN AMERICAN: >60
GLUCOSE BLD-MCNC: 92 MG/DL (ref 70–99)
HCT VFR BLD CALC: 38.7 % (ref 36–48)
HEMOGLOBIN: 13.2 G/DL (ref 12–16)
LYMPHOCYTES ABSOLUTE: 0.4 K/UL (ref 1–5.1)
LYMPHOCYTES RELATIVE PERCENT: 5.9 %
MCH RBC QN AUTO: 29.1 PG (ref 26–34)
MCHC RBC AUTO-ENTMCNC: 34.1 G/DL (ref 31–36)
MCV RBC AUTO: 85.4 FL (ref 80–100)
MONOCYTES ABSOLUTE: 0.4 K/UL (ref 0–1.3)
MONOCYTES RELATIVE PERCENT: 6.9 %
NEUTROPHILS ABSOLUTE: 5.2 K/UL (ref 1.7–7.7)
NEUTROPHILS RELATIVE PERCENT: 85.8 %
PDW BLD-RTO: 15.5 % (ref 12.4–15.4)
PLATELET # BLD: 215 K/UL (ref 135–450)
PMV BLD AUTO: 7.9 FL (ref 5–10.5)
POTASSIUM REFLEX MAGNESIUM: 3.6 MMOL/L (ref 3.5–5.1)
RBC # BLD: 4.54 M/UL (ref 4–5.2)
SODIUM BLD-SCNC: 139 MMOL/L (ref 136–145)
WBC # BLD: 6.1 K/UL (ref 4–11)

## 2022-08-23 PROCEDURE — 97110 THERAPEUTIC EXERCISES: CPT

## 2022-08-23 PROCEDURE — 97530 THERAPEUTIC ACTIVITIES: CPT

## 2022-08-23 PROCEDURE — 80048 BASIC METABOLIC PNL TOTAL CA: CPT

## 2022-08-23 PROCEDURE — 6370000000 HC RX 637 (ALT 250 FOR IP): Performed by: NURSE PRACTITIONER

## 2022-08-23 PROCEDURE — 99233 SBSQ HOSP IP/OBS HIGH 50: CPT | Performed by: NURSE PRACTITIONER

## 2022-08-23 PROCEDURE — 74230 X-RAY XM SWLNG FUNCJ C+: CPT

## 2022-08-23 PROCEDURE — 2060000000 HC ICU INTERMEDIATE R&B

## 2022-08-23 PROCEDURE — 85025 COMPLETE CBC W/AUTO DIFF WBC: CPT

## 2022-08-23 PROCEDURE — 36415 COLL VENOUS BLD VENIPUNCTURE: CPT

## 2022-08-23 PROCEDURE — 92526 ORAL FUNCTION THERAPY: CPT

## 2022-08-23 PROCEDURE — 92611 MOTION FLUOROSCOPY/SWALLOW: CPT

## 2022-08-23 RX ORDER — METOPROLOL SUCCINATE 50 MG/1
50 TABLET, EXTENDED RELEASE ORAL DAILY
Status: DISCONTINUED | OUTPATIENT
Start: 2022-08-24 | End: 2022-08-24 | Stop reason: HOSPADM

## 2022-08-23 RX ORDER — METOPROLOL SUCCINATE 25 MG/1
25 TABLET, EXTENDED RELEASE ORAL ONCE
Status: COMPLETED | OUTPATIENT
Start: 2022-08-23 | End: 2022-08-23

## 2022-08-23 RX ADMIN — METOPROLOL SUCCINATE 25 MG: 25 TABLET, EXTENDED RELEASE ORAL at 08:33

## 2022-08-23 RX ADMIN — APIXABAN 5 MG: 5 TABLET, FILM COATED ORAL at 21:43

## 2022-08-23 RX ADMIN — METOPROLOL SUCCINATE 25 MG: 25 TABLET, EXTENDED RELEASE ORAL at 12:28

## 2022-08-23 RX ADMIN — APIXABAN 5 MG: 5 TABLET, FILM COATED ORAL at 10:32

## 2022-08-23 NOTE — PROGRESS NOTES
[facility] [place]  Personalized Stroke Treatment Plan  My Stroke Type:   [] Ischemic Stroke (Blockage of blood flow to the brain)     [x] TIA - Transient Ischemic Attack (mini-stroke)    Personal risk factors you can control include:    [] Alcohol Abuse: check with your physician before any alcohol consumption. [] Atrial fibrillation: may cause blood clots. [] Drug Abuse: Seek help, talk with your doctor  [] Clotting Disorder  [] Diabetes  [x] Family history of stroke or heart disease  [x] High Blood Pressure/Hypertension: work with your physician.  [] High cholesterol: monitor cholesterol levels with your physician.   [] Overweight/Obesity: work with your physician for your ideal body weight. [x] Physical Inactivity: get regular exercise as directed by your physician. [] Personal history of previous TIA or stroke  [] Poor Diet; decrease salt (sodium) in your diet, follow diet directed by physician. [] Smoking: Cigarette/Cigar: stop smoking. Follow up with your physician is important after discharge. TAKE all medications as prescribed. Do not stop taking any medications   without talking to your physician. BE FAST is a simple way to remember the main symptoms of stroke. Recognizing these symptoms helps you know when to call for medical help. BE FAST stands for:                                                 B Balance problems                                                 E Eyes, vision lost        F  Face Drooping      A  Arm Weakness        S  Speech Difficulty      T  Time to Call 9-1-1  DO NOT DELAY THIS! Educated patient and/or family on personal risk factors for stroke/TIA. Included ways to reduce the risk for recurrent stroke. [facility] [employer]'s Stroke treatment and prevention, Managing your recovery  notebook  provided to patient. The notebook includes, but not limited to, sections addressing warning signs & symptoms of a stroke.  The need to call EMS ([phone]) immediately if signs & symptoms occur is emphasized . Medication information will be reviewed at discharge. The notebook also provides education on Stroke community resources and stroke advocacy.     Electronically signed by Electronically signed by Janiya Ruvalcaba RN on 8/23/2022 at 8:42 AM

## 2022-08-23 NOTE — PROGRESS NOTES
Hospitalist Progress Note      PCP: No primary care provider on file. Date of Admission: 8/20/2022    Chief Complaint: dysphagia    Hospital Course:      Subjective: in MBS study, not seen in person ; reportedly bilateral left sided weakness, worse in upper extremity      Medications:  Reviewed    Infusion Medications   Scheduled Medications    [START ON 8/24/2022] metoprolol succinate  50 mg Oral Daily    atorvastatin  80 mg Oral Nightly    apixaban  5 mg Oral BID    [Held by provider] clopidogrel  75 mg Oral Daily     PRN Meds: ondansetron **OR** ondansetron, polyethylene glycol, perflutren lipid microspheres      Intake/Output Summary (Last 24 hours) at 8/23/2022 1925  Last data filed at 8/23/2022 1657  Gross per 24 hour   Intake 480 ml   Output 600 ml   Net -120 ml       Exam:    BP (!) 159/93   Pulse 96   Temp 97.8 °F (36.6 °C) (Oral)   Resp 27   Ht 5' 6\" (1.676 m)   Wt 137 lb 2 oz (62.2 kg)   SpO2 99%   BMI 22.13 kg/m²   Not interviewed in person  General appearance: No apparent distress, appears stated age and cooperative. HEENT: Pupils equal, round, and reactive to light. Conjunctivae/corneas clear. Neck: Supple, with full range of motion. No jugular venous distention. Trachea midline. Respiratory:  Normal respiratory effort. Clear to auscultation, bilaterally without Rales/Wheezes/Rhonchi. Cardiovascular: Regular rate and rhythm with normal S1/S2 without murmurs, rubs or gallops. Abdomen: Soft, non-tender, non-distended with normal bowel sounds. Musculoskeletal: No clubbing, cyanosis or edema bilaterally. Full range of motion without deformity. Skin: Skin color, texture, turgor normal.  No rashes or lesions. Neurologic:  Neurovascularly intact without any focal sensory/motor deficits.  Cranial nerves: II-XII intact, grossly non-focal.  Psychiatric: Alert and oriented, thought content appropriate, normal insight  Capillary Refill: Brisk,< 3 seconds   Peripheral Pulses: +2 palpable, equal bilaterally       Labs:   Recent Labs     08/21/22  0648 08/22/22  0459 08/23/22  0442   WBC 4.1 4.1 6.1   HGB 11.1* 12.8 13.2   HCT 33.5* 37.3 38.7    195 215     Recent Labs     08/21/22  0648 08/22/22  0459 08/23/22  0442    142 139   K 3.8 3.9 3.6    106 104   CO2 25 24 25   BUN 13 9 15   CREATININE 0.5* <0.5* <0.5*   CALCIUM 9.0 9.1 9.0     Recent Labs     08/20/22 1959   AST 17   ALT <5*   BILITOT 0.6   ALKPHOS 137*     Recent Labs     08/20/22 1959   INR 1.13     Recent Labs     08/20/22 1959   TROPONINI <0.01       Urinalysis:    No results found for: Duque Trevizo, BACTERIA, RBCUA, BLOODU, [address]    Radiology:  Fluoroscopy modified barium swallow with video   Final Result   No evidence of aspiration. Penetration with large thin bolus. Reduced   motility. Please see separate speech pathology report for full discussion of findings   and recommendations. MRI brain without contrast   Final Result   1. There is an acute infarct involving the right posterior frontal corona   radiata extending along the right basal ganglia. No mass effect or midline   shift. 2. Otherwise, no acute intracranial abnormality. 3. Mild global parenchymal volume loss with mild chronic microvascular   ischemic changes. 4. Aerated secretions are seen in the left sphenoid sinus. CTA HEAD NECK W CONTRAST   Final Result   No significant interval change compared to the prior exam from 08/16/2022. No large vessel occlusion visualized. RECOMMENDATIONS:   Unavailable         CT HEAD WO CONTRAST   Final Result   Addendum (preliminary) 1 of 1   ADDENDUM:   Findings were discussed with MOSES Carrion at 8:50 pm on 8/20/2022. Final   Likely acute infarct within the right mid corona radiata. XR CHEST PORTABLE   Final Result   Redemonstration of right apical lung mass.                  Assessment/Plan:    Active Hospital Problems    Diagnosis Date Noted Paroxysmal atrial fibrillation ([address] [I48.0] 08/21/2022     Priority: Medium    Mass of upper lobe of right lung [R91.8] 08/21/2022     Priority: Medium    Acute cerebrovascular accident (CVA) due to occlusion of right middle cerebral artery [facility]) [I63.511] 08/20/2022     Priority: Medium        Acute CVA  - with symptoms: left side paralysis  - out of the tPA window  - head CT: likely acute infarct within the right mid corona radiata  - CTA head/neck: No flow limiting stenosis of head or neck, No LVO  -MRI brain with acute infarct  - plavix, statin  - consult neurology- recommend hold eliquis until stroke volume is known. Recommend plavix and not asa as pt has had adverse effect from asa  -per neurology- ok to resume [facility] tomorrow, 8/23/22. Would stop plavix when starting AC. - modified barium swallow 8/23     Atrial Fibrillation   - currently rate controlled  -Eliquis restarted. May need warfarin after discharge due to cost  -HR increased today, metoprolol ordered   -tele  -cardiology consult  -echo pending      Right apical lung mass  -seen on cxr, 6.9 cm mass or masslike consolidation within the medial right lung apex. -recommend ct of chest for further evaluation.  -discussed with daughter Leonardo Arzate, who thinks patient will likely not want to pursue workup for the mass. Leonardo Arzate will discuss it with patient and her sister [facility], and let us know if they want to pursue a cat scan or not. DVT Prophylaxis: Eliquis  Diet: ADULT DIET;  Dysphagia - Soft and Bite Sized  Code Status: Limited    PT/OT Eval Status: Needs acute rehab but insurance denied    Dolores Jones MD

## 2022-08-23 NOTE — CONSULTS
Department of [facility] Galleg[facility] Progress Note  8/23/2022  8:44 AM    Patient Name:   Dary Lea  YOB: 1950  Diagnosis: Acute cerebrovascular accident (CVA) due to occlusion of right middle cerebral artery [facility])        Subjective: Rehab consult received. Chart reviewed. Patient seen. Patient discussed with our rehab unit admission nurse. 66 y.o. female with recent diagnosis of a-fib presented to our hospital with worsening left upper and lower ext weakness. Pt was seen in the ED 8/16/22 and was found to be in a-fib. She progressed in her left-sided weakness, and had a profound facial droop. She is not able to move left upper or lower extremity at all. Work-up with a CT scan of the head revealed likely acute infarct within the right mid alegria radiata. [facility] scan reveals right posterior frontal corona radiata infarction extending along the right basal ganglia. Patient seen by neurology, and they state patient can resume anticoagulation today. Patient started therapies, patient needs min assist of 2 for bed mobility and transfers. Patient needs more therapy for discharge to home safely. Patient has Tessa Cameron, and will need prior approval for rehab unit treatment.       BP (!) 149/70   Pulse (!) 101   Temp 97.8 °F (36.6 °C) (Oral)   Resp 14   Ht 5' 6\" (1.676 m)   Wt 137 lb 2 oz (62.2 kg)   SpO2 98%   BMI 22.13 kg/m²     Last 24 hour lab  Recent Results (from the past 24 hour(s))   CBC with Auto Differential    Collection Time: 08/23/22  4:42 AM   Result Value Ref Range    WBC 6.1 4.0 - 11.0 K/uL    RBC 4.54 4.00 - 5.20 M/uL    Hemoglobin 13.2 12.0 - 16.0 g/dL    Hematocrit 38.7 36.0 - 48.0 %    MCV 85.4 80.0 - 100.0 fL    MCH 29.1 26.0 - 34.0 pg    MCHC 34.1 31.0 - 36.0 g/dL    RDW 15.5 (H) 12.4 - 15.4 %    Platelets 094 966 - 234 K/uL    MPV 7.9 5.0 - 10.5 fL    Neutrophils % 85.8 %    Lymphocytes % 5.9 %    Monocytes % 6.9 %    Eosinophils % 1.0 %    Basophils % 0.4 %    Neutrophils Absolute 5.2 1.7 - 7.7 K/uL    Lymphocytes Absolute 0.4 (L) 1.0 - 5.1 K/uL    Monocytes Absolute 0.4 0.0 - 1.3 K/uL    Eosinophils Absolute 0.1 0.0 - 0.6 K/uL    Basophils Absolute 0.0 0.0 - 0.2 K/uL   Basic Metabolic Panel w/ Reflex to MG    Collection Time: 08/23/22  4:42 AM   Result Value Ref Range    Sodium 139 136 - 145 mmol/L    Potassium reflex Magnesium 3.6 3.5 - 5.1 mmol/L    Chloride 104 99 - 110 mmol/L    CO2 25 21 - 32 mmol/L    Anion Gap 10 3 - 16    Glucose 92 70 - 99 mg/dL    BUN 15 7 - 20 mg/dL    Creatinine <0.5 (L) 0.6 - 1.2 mg/dL    GFR Non-African American >60 >60    GFR African American >60 >60    Calcium 9.0 8.3 - 10.6 mg/dL         Plan: We will send her information into her insurance company to get approval for rehab unit treatment.       Dr. Levi Lagos

## 2022-08-23 NOTE — PROGRESS NOTES
Spoke with [facility] from [facility] and a peer to peer has been offered. [phone] option 5.  Must have member's name, , member ID and call before  9am.  updated as well as rehab MD.

## 2022-08-23 NOTE — PROGRESS NOTES
Prior auth started with [facility] per their portal. Reference number [id number] in receipt. Expedited review requested for primary diagnosis of I63.511 and secondary of I48.0.

## 2022-08-23 NOTE — PROGRESS NOTES
1147  am gave 0.5mg  of iv ativan  wasted 1.5 mg of ativan from a 2mg vile   unable to wast bin med machine

## 2022-08-23 NOTE — PROCEDURES
INSTRUMENTAL SWALLOW REPORT  MODIFIED BARIUM SWALLOW    NAME: Ansley Lloyd   : 1950  MRN: [id number]       Date of Eval: 2022     Ordering Physician: Mar Calzada  Radiologist: Elvi Zendejas     Referring Diagnosis: oropharyngeal dysphagia; r 13.12    Past Medical History:  has no past medical history on file. Past Surgical History:  has no past surgical history on file. Type of Study: Initial MBS      CHART REVIEW:  2022 admitted with c/o L weakness  MD ADMISSION H&P HPI: 66 y.o. female with recent diagnosis of a-fib presented to [facility] ED with worsening left upper and lower ext weakness. Pt was seen in the ED 22 and was found to be in a-fib. She also had mild weakness to left lower ext and facial droop. Stroke team was consulted but pt not a candidate for thrombolytic therapy as she had mild sx. She refused to stay for further evaluation and monitoring. She did agree to start rx of eliquis and atenolol. She has since progressed and now is unable to stand/walk. She has profound facial droop. She is not able to move left upper or lower extremity at all. No numbness or sensory change. CT head today shows likely acute infarct within the right mid corona radiata. IMAGING:  CXR: 2022  Impression   Redemonstration of right apical lung mass. CT HEAD: 2022  Impression   Likely acute infarct within the right mid corona radiata. CTA HEAD/NECK: 2022  Impression   No significant interval change compared to the prior exam from 2022. No large vessel occlusion visualized. BRAIN MRI: 2022  Impression   1. There is an acute infarct involving the right posterior frontal corona   radiata extending along the right basal ganglia. No mass effect or midline   shift. 2. Otherwise, no acute intracranial abnormality. 3. Mild global parenchymal volume loss with mild chronic microvascular   ischemic changes.    4. Aerated secretions are seen in the left sphenoid sinus. Patient Complaints/Reason for Referral:  Gabriel Bird was referred for a MBS to assess the efficiency of his/her swallow function, assess for aspiration, and to make recommendations regarding safe dietary consistencies, effective compensatory strategies, and safe eating environment. Patient complaints: med team cocnern for dysphagia s/p CVA    Onset of problem:   Date of Onset: 8/20/2022    Behavior/Cognition/Vision/Hearing:  Behavior/Cognition: Alert; Cooperative;Pleasant mood  Vision: Within Functional Limits  Hearing: Within functional limits    Impressions:  Treatment Dx and ICD 10: oropharyngeal dysphagia; r 13.12    Patient Position: Lateral (Fully upright in VSE chair)    Consistencies Administered: Pureed; Moderately Thick cup;Mildly Thick cup; Thin cup; Thin straw;Minced and Moist;Soft & Bite Sized    Moderate oral stage dysphagia characterized by decreased mastication and decreased lingual manipulation. Prolonged mastication with soft and moist solids; concern for potential excess labor with soft solids. Prolonged bolus formation with all (textured solids>puree, liquids)  Prolonged oral transit with all. Premature bolus loss to the pharynx with all. Mild oral residue with thick liquids, puree, and textured solids; clears with repeat swallow independent of cueing. Mild-moderate pharyngeal stage dysphagia characterized by delayed swallow and decreased laryngeal elevation. Premature spillage to the valleculae with all. Mild vallecular residue with thick liquids, puree, and textured solids; clears with repeat swallow independent of cueing  Mild-mod pyriform residue with soft solid is cleared with immediate repeat swallow independent of cueing. Patient denies using straws and only takes small sips.  Max encouragement to accept trial via straw x1 and to administer large thin bolus for assessment purposes: deep penetration with large, thin bolus is not cleared; no penetration/aspiration with small sip thin via straw. Concern for reduced esophageal motility based on limited view during Modified Barium Swallow. Recommend minced/moist with thin liquids using small bites/sips only; meds crushed in puree/pudding. Patient adamantly declines rec for minced/moist (\"I will not eat that\"); defer to MD re: continued current diet vs downgrade; rec close monitor for tolerance if continues with soft/bite-size. Dysphagia Outcome Severity Scale: Level 3: Moderate dysphagia- Total assisstance, supervision or strategies. Two or more diet consistencies restricted  Penetration-Aspiration Scale (PAS): 3 - Material enters the airway, remains above the vocal folds, and is not ejected from airway    Recommended Diet:  Solid consistency: Minced and Moist (declines rec; request to continue soft/bite-sized)  Liquid consistency: Thin (SMALL SIPS)  Liquid administration via: Cup  Medication administration: Meds in puree    Safe Swallow Protocol:  Compensatory Swallowing Strategies : Upright as possible for all oral intake;Remain upright for 30-45 minutes after meals;Eat/Feed slowly; Small bites/sips;Swallow 2 times per bite/sip    Recommendations/Treatment  Requires SLP Intervention: Yes  D/C Recommendations: Ongoing speech therapy is recommended at next level of care  ST to tx 3-5 times per week during acute admission  Therapeutic Interventions: Diet tolerance monitoring;Patient/Family education; Bolus control exercises;Oral motor exercises; Laryngeal exercises; Pharyngeal exercises; Therapeutic PO trials with SLP    Education:   Patient Education: completed on results/recs/plan  Patient Education Response: Verbalizes understanding;Needs reinforcement    Goals:    Updated Dysphagia Goals: The patient will tolerate recommended diet without observed clinical signs of aspiration; The patient/caregiver will demonstrate understanding of compensatory strategies for improved swallowing safety. ;The patient will improve oral motor function via therapeutic oral motor exercises to 5/5 each trial.;The patient will improve oral preparation phase via bolus control/manipulation exercises to 5/5 each trial.;The patient will tolerate soft and bite sized foods 10/10. Oral Preparation / Oral Phase  decreased mastication  decreased lingual manipulation  prolonged mastication with soft and moist solids; concern for potential excess labor with soft solids. prolonged bolus formation with all (textured solids>puree, liquids)  prolonged oral transit with all  premature bolus loss to the pharynx with all.  mild oral residue with thick liquids, puree, and textured solids; clears with repeat swallow independent of cueing. Pharyngeal Phase  delayed swallow  decreased laryngeal elevation. premature spillage to the valleculae with all.  mild vallecular residue with thick liquids, puree, and textured solids; clears with repeat swallow independent of cueing  mild-mod pyriform residue with soft solid is cleared with immediate repeat swallow independent of cueing. *patient denies using straws and only takes small sips. Max encouragement to accept trial via straw x1 and to administer large thin bolus for assessment purposes: deep penetration with large, thin bolus is not cleared; no penetration/aspiration with small sip thin via straw*      Upper Esophageal Phase  Major Contributing Deficits: Decreased Esophageal Clearance      Pain   Did not state      Therapy Time:  3:00 PM - 3:25 PM  25 minutes    Owen Mark, #[id number]  Speech-Language Pathologist  Portable phone: [phone]  8/23/2022, 3:25 PM

## 2022-08-23 NOTE — PROGRESS NOTES
Physical Therapy  [facility]  Daily Treatment Note - COTX  NAME: Janice Moreno  : 1950  MRN: [id number]    Date of Service: 2022    Discharge Recommendations:  Continue to assess pending progress, IP Rehab, Subacute/Skilled Nursing Facility   PT Equipment Recommendations  Other: Defer to next level of care. Patient Diagnosis(es): The primary encounter diagnosis was Cerebrovascular accident (CVA) due to occlusion of right middle cerebral artery ([address] Diagnoses of Left hemiparesis ([address] and Facial droop were also pertinent to this visit. Assessment   Assessment: Pt able to stand to stedy with Min A x 1, and maintain standing balance at stedy with Min A x 1. She remains flaccid in LUE/LLE, but is agreeable to participating in therapy. HR slightly better controlled today. She would benefit from continued therapy to maximize her strength, balance, endurance, safety awareness, and independence completing mobility tasks. PT recommends either high-frequency, or low-moderate frequency therapy upon D/C. Janice Moreno scored a 9/24 on the AM-PAC short mobility form. Current research shows that an AM-PAC score of 17 or less is typically not associated with a discharge to the patient's home setting. Based on the patient's AM-PAC score and their current functional mobility deficits, it is recommended that the patient have 5-7 sessions per week of Physical Therapy at d/c to increase the patient's independence. At this time, this patient demonstrates complex nursing, medical, and rehabilitative needs, and would benefit from intensive rehabilitation services upon discharge from the Inpatient setting.   This patient demonstrates the ability to participate in and benefit from an intensive therapy program with a coordinated interdisciplinary team approach to foster frequent, structured, and documented communication among disciplines, who will work together to in Place: No (Simultaneous filing. User may not have seen previous data.)       Goals  Short Term Goals  Time Frame for Short term goals: Upon d/c acute care setting. Short term goal 1: Bed Mob Mod assist x 2. Short term goal 2: EOB ~ 5 min with Mod assist.  Short term goal 3: Transfers via Stedy Mod assist x 2. Short term goal 4: Pt participating approp Ex/Neuro Musc Activity. Patient Goals   Patient goals : Get better/be able to walk again; return home. Education  Patient Education  Education Provided Comments: Role of PT, POC, Need to call for assist, OOB via stedy with staff, importance of moving LUE/LLE as able to maximize recovery, benefits of ARU;  Review of LE HEP  Education Method: Verbal;Demonstration  Barriers to Learning: Cognition;Vision  Education Outcome: Continued education needed    Therapy Time   Individual Concurrent Group Co-treatment   Time In       3409   Time Out       1126   Minutes       30   Timed Code Treatment Minutes: [address] Maxwell, PT   Electronically signed by Octavia Maxwell, PT [id number] on 8/23/2022 at 11:45 AM

## 2022-08-23 NOTE — PROGRESS NOTES
Physical Therapy  Progress Note  Pt resting in bed. Agreeable to bed exercises. Pt able to initiate L hip ADD contraction (weak) with cueing. Pt completed (B) hip ADD isometric x 20. Pt then able to complete L hip Ext AAROM x 20, max cues to initiate motion. Pt unable to perform L hip IR/ER AROM this afternoon. In hook-lying, pt able to complete (B) bent-knee fallout x 10 with Min A for LLE control. Pt unable to initiate any LUE elbow flex/Ext AROM, but was able to initiate L shoulder IR AAROM  x 15. Pt also practiced rolling L/R in bed x 2 trials, able to complete with cues for technique, and Min A. At end of session she was positioned for comfort in bed, call light in reach, alarm in place. Time In: 1400  Time Coded Tx: 23 min.    Electronically signed by Logan Jones, DUSTIN [id number] on 8/23/2022 at 2:31 PM

## 2022-08-23 NOTE — PROGRESS NOTES
symptoms occur is emphasized . The notebook also provides education on Stroke community resources and stroke advocacy. The need for follow-up after discharge was highlighted with patient/family with them being able to repeat understanding of the importance of this.       Electronically signed by Parisa Sebastian RN on 8/22/2022 at 8:31 PM

## 2022-08-23 NOTE — PROGRESS NOTES
balance;Decreased ADL status; Decreased high-level IADLs;Decreased endurance;Decreased ROM; Decreased strength;Decreased fine motor control;Decreased safe awareness;Decreased cognition;Decreased coordination  Assessment: Pt tolerated session well. Pt continues to be limited due to flaccid L side and L sided neglect. Pt completes bed mobility with Max A. Pt completes transfers with Min A and use of stedy. Pt with increased ability to correct balance in sitting and standing in stedy this date. Pt able to tolerate increased time in stance. Continue with POC. Prognosis: Fair  REQUIRES OT FOLLOW-UP: Yes  Activity Tolerance  Activity Tolerance: Patient Tolerated treatment well;Patient limited by fatigue        Plan   Plan  Times per Week: 3-5x  Current Treatment Recommendations: Strengthening, ROM, Balance training, Functional mobility training, Endurance training, Patient/Caregiver education & training, Neuromuscular re-education, Safety education & training, Self-Care / ADL, Equipment evaluation, education, & procurement     Restrictions  Restrictions/Precautions  Restrictions/Precautions: NPO, Fall Risk  Position Activity Restriction  Other position/activity restrictions: L Hemiparesis. Subjective   General  Chart Reviewed: Yes  Patient assessed for rehabilitation services?: Yes  Additional Pertinent Hx: per ED note, Jaince Moreno is a 66 y.o. female who presents to the emergency department with EMS for evaluation of worsening left-sided weakness over the past 4 days. Patient was seen on 8/16/2022 in the emergency room and diagnosed with TIA as well as atrial fibrillation. Recommendation was for admission at that time but patient signed out AMA. Patient states that since she been home she has been having progressive left-sided weakness and is now no longer able to move her left upper lower extremity. Patient also has profound left-sided facial droop.   Patient denies any numbness or weakness in the right side of the body. States she does have a headache. Family / Caregiver Present: Yes (family)  Referring Practitioner: LEDY Purvis CNP  Subjective  Subjective: Pt agreeable to OT treatment. Pt reports some pain in L ankle with no number stated. General Comment  Comments: okay for therapy per RN. Social/Functional History  Social/Functional History  Lives With:  Alannah Khalil (Shantanu Wilde, 33 yr, \"works 3 jobs\"). )  Type of Home: House  Home Layout: Multi-level, Able to Live on Main level with bedroom/bathroom, Laundry in basement  Home Access: Stairs to enter with rails (4-5 steps to enter.)  Bathroom Shower/Tub: Tub/Shower unit  Bathroom Toilet: Standard  Bathroom Equipment:  (No DME pta.)  Bathroom Accessibility: Accessible  Home Equipment: Elverna Corti, standard (Family obtained Stand Elverna Corti following return home from ER (8/16/2022). 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 Additional Comments: Pt to ER 8/16/2022 with TIA; left AMA. Prior this, pt independent all care/mobility. Pt reports progress L sided weak since return home. Objective   Heart Rate: 88  Heart Rate Source: Monitor  BP: 134/85  BP Location: Right Arm  BP Method: Automatic  Patient Position: Semi fowlers  MAP (Calculated): 101.33  Resp: 16  SpO2: 99 %  O2 Device: None (Room air)             Safety Devices  Type of Devices: All fall risk precautions in place;Call light within reach; Chair alarm in place;Gait belt;Left in chair;Nurse notified (Simultaneous filing. User may not have seen previous data.)  Restraints  Restraints Initially in Place: No (Simultaneous filing.  User may not have seen previous data.)  Bed Mobility Training  Bed Mobility Training: Yes  Supine to Sit: Maximum assistance  Balance  Sitting: With support  Standing: With support  Transfer Training  Transfer Training: Yes  Overall Level of Assistance: Minimum assistance (stedy)  Sit to Stand: Minimum assistance (To stedy)  Stand to Sit: Minimum assistance  Bed to Chair: Total assistance (Stedy)     AROM: Grossly decreased, non-functional (flaccid L UE; some noted AROM in traps)  PROM: Within functional limits  Strength: Grossly decreased, non-functional (flaccid L UE)  Coordination: Grossly decreased, non-functional (flaccid L UE)  Tone: Abnormal (flaccid L UE)  Sensation:  (Pt reports intact sensation although difficulty assessing)  ADL  Additional Comments: Pt with no ADL needs at this time              Vision  Vision:  (L side neglect)  Hearing  Hearing: Within functional limits  Orientation  Overall Orientation Status: Within Functional Limits                  Education Given To: Patient  Education Provided: Role of Therapy;Transfer Training;Plan of Care  Education Method: Demonstration;Verbal  Barriers to Learning: Cognition  Education Outcome: Continued education needed       AM-PAC Score        AM-PAC Inpatient Daily Activity Raw Score: 13 (08/22/22 1450)  AM-PAC Inpatient ADL T-Scale Score : 32.03 (08/22/22 1450)  ADL Inpatient CMS 0-100% Score: 63.03 (08/22/22 1450)  ADL Inpatient CMS G-Code Modifier : CL (08/22/22 1450)    Tinneti Score       Goals  Short Term Goals  Time Frame for Short term goals: prior to D/C; ongoing 8/23  Short Term Goal 1: complete bed mobility with Min A  Short Term Goal 2: complete transfers with Min A  Short Term Goal 3: complete bathing and dressing with Mod A  Short Term Goal 4: complete toileting with Mod A  Short Term Goal 5: complete grooming with setup  Long Term Goals  Time Frame for Long term goals : STG=LTG  Patient Goals   Patient goals : increase independence       Therapy Time   Individual Concurrent Group Co-treatment   Time In 1115         Time Out 1145         Minutes 30 Timed Code Treatment Minutes: [facility], OTR/L

## 2022-08-23 NOTE — CARE COORDINATION
Notified Dr Yuridia Tony of need for peer to peer. MD must call [phone] option [phone]. Please provide patient's name, , & ID #[id number].  Deadline to complete peer to peer is tomorrow  at [address] [place] [address].  Electronically signed by Robb Arteaga RN Case Management [phone] on 2022 at 2:00 PM

## 2022-08-23 NOTE — PROGRESS NOTES
Cardiac Electrophysiology Progress Note     Admit Date: 2022     Reason for follow up: atrial fibrillation    HPI and Interval History:   Patient is a 66 y.o. female who presents to the emergency department with EMS for evaluation of worsening left-sided weakness over the past 4 days. Patient was seen on 2022 in the emergency room and diagnosed with TIA as well as atrial fibrillation. Recommendation was for admission at that time but patient signed out AMA. Patient states that since she been home she has been having progressive left-sided weakness and is now no longer able to move her left upper lower extremity. Patient also has profound left-sided facial droop. Patient denies any numbness or weakness in the right side of the body. States she does have a headache. CT suggests a CVA. Her cxr reveals an apical mass. Remains in atrial fibrillation, rates uncontrolled at times. Discussed echo findings, LA severely dilated. Pt wanting to start Eliquis despite cost. Family at bedside. Physical Examination:  Vitals:    22 1100   BP: 134/85   Pulse: 88   Resp: 16   Temp: 97.7 °F (36.5 °C)   SpO2: 99%        Intake/Output Summary (Last 24 hours) at 2022 1212  Last data filed at 2022 1147  Gross per 24 hour   Intake 240 ml   Output 300 ml   Net -60 ml     In: 240 [P.O.:240]  Out: 300    Wt Readings from Last 3 Encounters:   22 137 lb 2 oz (62.2 kg)   22 139 lb 12.4 oz (63.4 kg)   22 150 lb 5.7 oz (68.2 kg)     Temp  Av °F (36.7 °C)  Min: 97.5 °F (36.4 °C)  Max: 98.4 °F (36.9 °C)  Pulse  Av.4  Min: 68  Max: 119  BP  Min: 127/69  Max: 166/90  SpO2  Av.2 %  Min: 94 %  Max: 99 %    Telemetry: Atrial fibrillation v-rates 110s. Constitutional: Alert, in no acute distress. Appears stated age. Head: Normocephalic and atraumatic. Eyes: Conjunctivae normal. EOM are normal.   Neck: Neck supple. No lymphadenopathy. No rigidity. No JVD present.    Cardiovascular: Fast rate, IRR. No murmurs, rubs or gallops. No S3 or S4.  Pulmonary/Chest: Clear breath sounds bilaterally. No crackles, wheezes or rhonchi. No respiratory accessory muscle use. Abdominal: Soft. Normal bowel sounds present. No distension, No tenderness. Musculoskeletal: No tenderness. No edema    Lymphadenopathy: Has no cervical adenopathy. Neurological: Alert and oriented. L facial droop, L hemiparesis. Skin: Skin is warm and dry. No rash, lesions, ulcerations noted. Psychiatric: No anxiety or agitation. Labs, diagnostic and imaging results reviewed. Reviewed. Recent Labs     22  0648 229 222    142 139   K 3.8 3.9 3.6    106 104   CO2 25 24 25   BUN 13 9 15   CREATININE 0.5* <0.5* <0.5*     Recent Labs     22  0648 222   WBC 4.1 4.1 6.1   HGB 11.1* 12.8 13.2   HCT 33.5* 37.3 38.7   MCV 87.5 86.6 85.4    195 215     Lab Results   Component Value Date/Time    TROPONINI <0.01 2022 07:59 PM     Estimated Creatinine Clearance: 94 mL/min (based on SCr of 0.5 mg/dL). No results found for: BNP  Lab Results   Component Value Date/Time    PROTIME 14.5 2022 07:59 PM    PROTIME 12.9 2022 06:17 PM    INR 1.13 2022 07:59 PM    INR 0.98 2022 06:17 PM     Lab Results   Component Value Date/Time    CHOL 208 2022 06:48 AM    HDL 59 2022 06:48 AM    TRIG 61 2022 06:48 AM       Scheduled Meds:   metoprolol succinate  25 mg Oral Daily    atorvastatin  80 mg Oral Nightly    apixaban  5 mg Oral BID    [Held by provider] clopidogrel  75 mg Oral Daily     Continuous Infusions:  PRN Meds:ondansetron **OR** ondansetron, polyethylene glycol, perflutren lipid microspheres     EC22  Atrial fibrillation at 88 BPM.     Echo: 22   A bubble study was performed and fails to show evidence of shunting. Normal LV size,wall thickness and motion. EF    60%.  Probably has severe   diastolic dysfunction. The left atrium is severely dilated. Normal right ventricular size and function. Mild Mitral, Aortic and Tricuspid Regurgitation. Assessment and Plan:     New atrial fibrillation   - first noted on EKG on 8/16/22   - increase Toprol to 50mg QD for rate control   - CHADS2-VASc at least 5 (age, gender, HTN, CVA) - on Eliquis 5mg BID   - echo with severely dilated LA   - TSH, electrolytes WNL   - if remains in atrial fibrillation after at least 3 weeks of anticoagulation (resumed 8/23) can attempt outpatient cardioversion    Acute CVA   - possibly seen on head CT   - brain MRI with CVA in R posterior frontal corona radiata   - care per neurology    Essential HTN   - assume permissive hypertension for now   - on Toprol for rate control    EP issues richard stable, will sign off and arrange follow up. Please call with concerns.     LEDY Freed  [facility], [address] [place] [address]  Phone: [phone]  Fax: [phone]    Electronically signed by LEDY Villatoro - CNP on 8/23/2022 at 12:12 PM

## 2022-08-24 ENCOUNTER — HOSPITAL ENCOUNTER (INPATIENT)
Age: 72
LOS: 4 days | Discharge: LEFT BEFORE DISCHARGE | DRG: 057 | End: 2022-08-28
Attending: PHYSICAL MEDICINE & REHABILITATION | Admitting: PHYSICAL MEDICINE & REHABILITATION
Payer: MEDICARE

## 2022-08-24 VITALS
TEMPERATURE: 98.8 F | OXYGEN SATURATION: 98 % | SYSTOLIC BLOOD PRESSURE: 186 MMHG | DIASTOLIC BLOOD PRESSURE: 76 MMHG | BODY MASS INDEX: 21.4 KG/M2 | RESPIRATION RATE: 15 BRPM | HEIGHT: 66 IN | HEART RATE: 85 BPM | WEIGHT: 133.16 LBS

## 2022-08-24 PROBLEM — I63.9 RIGHT HEMISPHERE, CEREBRAL INFARCTION (HCC): Status: ACTIVE | Noted: 2022-08-24

## 2022-08-24 LAB
ANION GAP SERPL CALCULATED.3IONS-SCNC: 10 MMOL/L (ref 3–16)
BASOPHILS ABSOLUTE: 0 K/UL (ref 0–0.2)
BASOPHILS RELATIVE PERCENT: 0.2 %
BUN BLDV-MCNC: 18 MG/DL (ref 7–20)
CALCIUM SERPL-MCNC: 8.8 MG/DL (ref 8.3–10.6)
CHLORIDE BLD-SCNC: 104 MMOL/L (ref 99–110)
CO2: 22 MMOL/L (ref 21–32)
CREAT SERPL-MCNC: <0.5 MG/DL (ref 0.6–1.2)
EOSINOPHILS ABSOLUTE: 0 K/UL (ref 0–0.6)
EOSINOPHILS RELATIVE PERCENT: 0.6 %
GFR AFRICAN AMERICAN: >60
GFR NON-AFRICAN AMERICAN: >60
GLUCOSE BLD-MCNC: 100 MG/DL (ref 70–99)
HCT VFR BLD CALC: 36.7 % (ref 36–48)
HEMOGLOBIN: 12.2 G/DL (ref 12–16)
LYMPHOCYTES ABSOLUTE: 0.3 K/UL (ref 1–5.1)
LYMPHOCYTES RELATIVE PERCENT: 4.4 %
MCH RBC QN AUTO: 29.1 PG (ref 26–34)
MCHC RBC AUTO-ENTMCNC: 33.2 G/DL (ref 31–36)
MCV RBC AUTO: 87.6 FL (ref 80–100)
MONOCYTES ABSOLUTE: 0.5 K/UL (ref 0–1.3)
MONOCYTES RELATIVE PERCENT: 6.1 %
NEUTROPHILS ABSOLUTE: 7 K/UL (ref 1.7–7.7)
NEUTROPHILS RELATIVE PERCENT: 88.7 %
PDW BLD-RTO: 15.5 % (ref 12.4–15.4)
PLATELET # BLD: 200 K/UL (ref 135–450)
PMV BLD AUTO: 7.9 FL (ref 5–10.5)
POTASSIUM REFLEX MAGNESIUM: 3.8 MMOL/L (ref 3.5–5.1)
RBC # BLD: 4.18 M/UL (ref 4–5.2)
SODIUM BLD-SCNC: 136 MMOL/L (ref 136–145)
WBC # BLD: 7.9 K/UL (ref 4–11)

## 2022-08-24 PROCEDURE — 97116 GAIT TRAINING THERAPY: CPT

## 2022-08-24 PROCEDURE — 6370000000 HC RX 637 (ALT 250 FOR IP): Performed by: PHYSICAL MEDICINE & REHABILITATION

## 2022-08-24 PROCEDURE — 85025 COMPLETE CBC W/AUTO DIFF WBC: CPT

## 2022-08-24 PROCEDURE — 6370000000 HC RX 637 (ALT 250 FOR IP): Performed by: NURSE PRACTITIONER

## 2022-08-24 PROCEDURE — 97530 THERAPEUTIC ACTIVITIES: CPT

## 2022-08-24 PROCEDURE — 80048 BASIC METABOLIC PNL TOTAL CA: CPT

## 2022-08-24 PROCEDURE — 97129 THER IVNTJ 1ST 15 MIN: CPT

## 2022-08-24 PROCEDURE — 92526 ORAL FUNCTION THERAPY: CPT

## 2022-08-24 PROCEDURE — 36415 COLL VENOUS BLD VENIPUNCTURE: CPT

## 2022-08-24 PROCEDURE — 1280000000 HC REHAB R&B

## 2022-08-24 PROCEDURE — 97110 THERAPEUTIC EXERCISES: CPT

## 2022-08-24 PROCEDURE — 94760 N-INVAS EAR/PLS OXIMETRY 1: CPT

## 2022-08-24 RX ORDER — ACETAMINOPHEN 325 MG/1
650 TABLET ORAL EVERY 4 HOURS PRN
Status: CANCELLED | OUTPATIENT
Start: 2022-08-24

## 2022-08-24 RX ORDER — METOPROLOL SUCCINATE 50 MG/1
50 TABLET, EXTENDED RELEASE ORAL DAILY
Status: CANCELLED | OUTPATIENT
Start: 2022-08-25

## 2022-08-24 RX ORDER — ATORVASTATIN CALCIUM 80 MG/1
80 TABLET, FILM COATED ORAL NIGHTLY
Status: CANCELLED | OUTPATIENT
Start: 2022-08-24

## 2022-08-24 RX ORDER — CLOPIDOGREL BISULFATE 75 MG/1
75 TABLET ORAL DAILY
Status: DISCONTINUED | OUTPATIENT
Start: 2022-08-25 | End: 2022-08-28 | Stop reason: HOSPADM

## 2022-08-24 RX ORDER — POLYETHYLENE GLYCOL 3350 17 G/17G
17 POWDER, FOR SOLUTION ORAL DAILY PRN
Status: DISCONTINUED | OUTPATIENT
Start: 2022-08-24 | End: 2022-08-28 | Stop reason: HOSPADM

## 2022-08-24 RX ORDER — ATORVASTATIN CALCIUM 80 MG/1
80 TABLET, FILM COATED ORAL NIGHTLY
Qty: 30 TABLET | Refills: 3 | Status: SHIPPED | OUTPATIENT
Start: 2022-08-24

## 2022-08-24 RX ORDER — ONDANSETRON 4 MG/1
4 TABLET, FILM COATED ORAL EVERY 8 HOURS PRN
Status: DISCONTINUED | OUTPATIENT
Start: 2022-08-24 | End: 2022-08-28 | Stop reason: HOSPADM

## 2022-08-24 RX ORDER — CLOPIDOGREL BISULFATE 75 MG/1
75 TABLET ORAL DAILY
Status: CANCELLED | OUTPATIENT
Start: 2022-08-25 | End: 2022-09-11

## 2022-08-24 RX ORDER — ACETAMINOPHEN 325 MG/1
650 TABLET ORAL EVERY 4 HOURS PRN
Status: DISCONTINUED | OUTPATIENT
Start: 2022-08-24 | End: 2022-08-28 | Stop reason: HOSPADM

## 2022-08-24 RX ORDER — METOPROLOL SUCCINATE 50 MG/1
50 TABLET, EXTENDED RELEASE ORAL DAILY
Qty: 30 TABLET | Refills: 3 | Status: SHIPPED | OUTPATIENT
Start: 2022-08-25

## 2022-08-24 RX ORDER — SENNA AND DOCUSATE SODIUM 50; 8.6 MG/1; MG/1
2 TABLET, FILM COATED ORAL 2 TIMES DAILY
Status: CANCELLED | OUTPATIENT
Start: 2022-08-24

## 2022-08-24 RX ORDER — METOPROLOL SUCCINATE 50 MG/1
50 TABLET, EXTENDED RELEASE ORAL DAILY
Status: DISCONTINUED | OUTPATIENT
Start: 2022-08-25 | End: 2022-08-26

## 2022-08-24 RX ORDER — ONDANSETRON 4 MG/1
4 TABLET, FILM COATED ORAL EVERY 8 HOURS PRN
Status: CANCELLED | OUTPATIENT
Start: 2022-08-24

## 2022-08-24 RX ORDER — SENNA AND DOCUSATE SODIUM 50; 8.6 MG/1; MG/1
2 TABLET, FILM COATED ORAL 2 TIMES DAILY
Status: DISCONTINUED | OUTPATIENT
Start: 2022-08-24 | End: 2022-08-28 | Stop reason: HOSPADM

## 2022-08-24 RX ORDER — POLYETHYLENE GLYCOL 3350 17 G/17G
17 POWDER, FOR SOLUTION ORAL DAILY PRN
Status: CANCELLED | OUTPATIENT
Start: 2022-08-24

## 2022-08-24 RX ORDER — ATORVASTATIN CALCIUM 80 MG/1
80 TABLET, FILM COATED ORAL NIGHTLY
Status: DISCONTINUED | OUTPATIENT
Start: 2022-08-24 | End: 2022-08-28 | Stop reason: HOSPADM

## 2022-08-24 RX ADMIN — APIXABAN 5 MG: 5 TABLET, FILM COATED ORAL at 20:54

## 2022-08-24 RX ADMIN — METOPROLOL SUCCINATE 50 MG: 50 TABLET, EXTENDED RELEASE ORAL at 08:36

## 2022-08-24 RX ADMIN — APIXABAN 5 MG: 5 TABLET, FILM COATED ORAL at 08:36

## 2022-08-24 NOTE — PROGRESS NOTES
Insurance approval received for ARU.    Plan authorization ID: [id number]  [facility] Authorization ID: [id number]  Dates approved: 8/24 - 8/30 with update due 8/30  Electronically signed by Keyla Dorado RN on 8/24/2022 at 10:32 AM

## 2022-08-24 NOTE — PROGRESS NOTES
Physical Therapy  Facility/Department: [address] PROGRESSIVE CARE  Physical Therapy Daily Treatment Note    Name: Cintia Fofana  : 1950  MRN: [id number]  Date of Service: 2022    Discharge Recommendations: Cintia Fofana scored a  on the AM-PAC short mobility form. Current research shows that an AM-PAC score of 17 or less is typically not associated with a discharge to the patient's home setting. Based on the patient's AM-PAC score and their current functional mobility deficits, it is recommended that the patient have 5-7 sessions per week of Physical Therapy at d/c to increase the patient's independence. At this time, this patient demonstrates complex nursing, medical, and rehabilitative needs, and would benefit from intensive rehabilitation services upon discharge from the Inpatient setting. This patient demonstrates the ability to participate in and benefit from an intensive therapy program with a coordinated interdisciplinary team approach to foster frequent, structured, and documented communication among disciplines, who will work together to establish, prioritize, and achieve treatment goals. Please see assessment section for further patient specific details. If patient discharges prior to next session this note will serve as a discharge summary. Please see below for the latest assessment towards goals. IP Rehab, Patient able to tolerate 3hrs of therapy a day, Patient would benefit from continued therapy after discharge, Therapy recommended at discharge   PT Equipment Recommendations  Equipment Needed:  (Defer to next level of care.)      Patient Diagnosis(es): The primary encounter diagnosis was Cerebrovascular accident (CVA) due to occlusion of right middle cerebral artery ([address] Diagnoses of Left hemiparesis ([address] and Facial droop were also pertinent to this visit. Past Medical History:  has no past medical history on file.   Past Surgical History:  has no past surgical history on file. Assessment   Body Structures, Functions, Activity Limitations Requiring Skilled Therapeutic Intervention: Decreased functional mobility ; Decreased strength;Decreased endurance;Decreased sensation;Decreased balance  Assessment: Pt demonstrates slight improvement in response to therapy services. Pt able to stand with min A x 1 in the abdon stedy 3 times throughout the treatment session. Pt also able to perform pre-gait activity (weight shifting) with tactile cueing for upright posture and L knee blocking. Pt continues to demonstrate flaccidity in the L UE and LE with no AROM observed in the L LE musculature this date. Pt will continue to benefit from skilled PT to facilitate return to PLOF and to promote independence. Treatment Diagnosis: Impaired functional mobility  Therapy Prognosis: Fair  History: 65 y/o female admit 8/20/2022 with L Facial Droop, L Sided Weakness. CT Head : Likely Acute Infarct R Mid Corona Radiata. MRI pending. Recent to ED 8/16/2022 with TIA (pt left AMA). PMH as noted including A-Fib, TIA (8/16/2022). Requires PT Follow-Up: Yes  Activity Tolerance  Activity Tolerance Comments: Pt tolerated the PT treatment session well with no significant limitations     Plan   Plan  Plan: 3-5 times per week  Current Treatment Recommendations: Strengthening, Balance training, Functional mobility training, Transfer training, Neuromuscular re-education, Safety education & training, Patient/Caregiver education & training, ADL/Self-care training, Gait training, Wheelchair mobility training, Endurance training, Home exercise program, Equipment evaluation, education, & procurement  Safety Devices  Type of Devices:  All fall risk precautions in place, Call light within reach, Gait belt, Chair alarm in place, Left in chair, Nurse notified  Restraints  Restraints Initially in Place: No     Restrictions  Restrictions/Precautions  Restrictions/Precautions: Fall Risk, Up as Tolerated, Modified Diet TIA; left AMA. Prior this, pt independent all care/mobility. Pt reports progress L sided weak since return home. Cognition   Orientation  Overall Orientation Status: Within Functional Limits  Cognition  Overall Cognitive Status: Exceptions  Arousal/Alertness: Appropriate responses to stimuli  Following Commands: Follows one step commands with increased time; Follows one step commands with repetition  Attention Span: Attends with cues to redirect  Initiation: Requires cues for some  Sequencing: Requires cues for some     Objective   Observation/Palpation  Observation: Pt on RA on arrival. Gaze directed with cervial rotation to the R.      Bed mobility  Bed Mobility Comments: Not completed as pt seated in recliner at the beginning and end of the treatment session. Transfers  Sit to Stand: Minimal Assistance (Up to abdon stedy)  Stand to sit: Minimal Assistance (With abdon stedy)  Ambulation  Comments: Pre-gait activity performed: standing weight shifts with tactile cues on lumbar spine for improved upright posture and L knee blocking (2 x 1 minute)     Balance  Standing - Static:  (Min A)  Standing - Dynamic:  (Mod A)  PROM Exercises: Seated PROM therapeutic exercises with visual imagery: L hip flexion, L knee extension and flexion, L ankle dorsiflexion and plantarflexion, L hip abduction/adduction (1 x 10 each)  A/AROM Exercises: Cervical rotation to the L AAROM (1 x 10)      AM-PAC Score  AM-PAC Inpatient Mobility Raw Score : 9 (08/24/22 1224)  AM-PAC Inpatient T-Scale Score : 30.55 (08/24/22 1224)  Mobility Inpatient CMS 0-100% Score: 81.38 (08/24/22 1224)  Mobility Inpatient CMS G-Code Modifier : CM (08/24/22 1224)     Goals  Short Term Goals  Time Frame for Short term goals: Upon d/c acute care setting. Short term goal 1: Bed Mob Mod assist x 2.   Short term goal 2: EOB ~ 5 min with Mod assist.  Short term goal 3: Transfers via Stedy Mod assist x 2.- met 8/23  Short term goal 4: Pt participating approp Ex/Neuro Musc Activity. Short term goal 5: Pt will transfer from bed to/from chair with CGA x 1 via abdon mena  Patient Goals   Patient goals : Get better/be able to walk again; return home.   *One goal met and updated 8/24/22    Education  Patient Education  Education Given To: Patient  Education Provided: Role of Therapy;Plan of Care;Home Exercise Program;Transfer Training (Educated on visual imagery for AROM of the L LE)  Education Method: Verbal  Barriers to Learning: Cognition  Education Outcome: Verbalized understanding;Continued education needed    Therapy Time   Individual Concurrent Group Co-treatment   Time In 1146         Time Out 1225         Minutes 39         Timed Code Treatment Minutes: [facility], [address] [facility], T [id number]

## 2022-08-24 NOTE — CARE COORDINATION
CASE MANAGEMENT DISCHARGE SUMMARY:    DISCHARGE DATE: 8/24/2022    DISCHARGED TO: [facility]     COMMENTS: Patient approved to go to ARU today. RN to facilitate transfer when to ARU when ready.       Electronically signed by Kenroy Claudio RN Case Management [phone] on 8/24/2022 at 3:33 PM

## 2022-08-24 NOTE — PROGRESS NOTES
Clinical Pharmacy Note  Medication Counseling    Reviewed new medications started during hospital admission: Apixaban. Indications and side effects were emphasized during counseling. All medication-related questions addressed. Patient verbalized understanding of education. Should the patient express any additional questions or concerns regarding their medications, please do not hesitate to contact the pharmacy department. Patient/caregiver aware they may refuse medications during hospital stay. 10 minutes spent educating patient regarding medications.   Meli Morris [facility], 8/24/2022 2:41 PM

## 2022-08-24 NOTE — PROGRESS NOTES
08/24/22 1322   Encounter Summary   Encounter Overview/Reason  Rituals, Rites and Sacraments   Service Provided For: Patient   Referral/Consult From: Other    Support System Children   Last Encounter  26/28/62  ([facility] ronen Kulkarni CM)   Complexity of Encounter Moderate   Begin Time 1130   End Time  1150   Total Time Calculated 20 min   Encounter    Type Initial Screen/Assessment   Spiritual/Emotional needs   Type Spiritual Support   Rituals, Rites and Sacraments   Type Sacrament of Sick; Anointing   Grief, Loss, and Adjustments   Type Life Adjustments   Assessment/Intervention/Outcome   Assessment Coping; Hopeful   Intervention Active listening;Discussed relationship with God;Discussed meaning/purpose;Discussed illness injury and its impact; Explored Coping Skills/Resources   Outcome Coping;Comfort;Encouraged;Expressed Gratitude;Expressed feelings, needs, and concerns   Chaplain Fr YANEZ 8/24/2022

## 2022-08-24 NOTE — PROGRESS NOTES
Speech Language/Pathology   SPEECH LANGUAGE AND CLINICAL BEDSIDE SWALLOWING TREATMENT  Speech Therapy Department     Starlet Civil  AGE: 66 y.o. GENDER: female  : 1950  [id number]  EPISODE DATE:  2022    MEDICAL DIAGNOSIS: CVA  ONSET: 2022     Chief Complaint   Patient presents with    Extremity Weakness     Left sided weakness           PAST MEDICAL HISTORY  History reviewed. No pertinent past medical history. PAST SURGICAL HISTORY  History reviewed. No pertinent surgical history. ALLERGIES  No Known Allergies    CHART REVIEW:  2022 admitted with c/o L weakness  MD ADMISSION H&P HPI: 66 y.o. female with recent diagnosis of a-fib presented to [facility] ED with worsening left upper and lower ext weakness. Pt was seen in the ED 22 and was found to be in a-fib. She also had mild weakness to left lower ext and facial droop. Stroke team was consulted but pt not a candidate for thrombolytic therapy as she had mild sx. She refused to stay for further evaluation and monitoring. She did agree to start rx of eliquis and atenolol. She has since progressed and now is unable to stand/walk. She has profound facial droop. She is not able to move left upper or lower extremity at all. No numbness or sensory change. CT head today shows likely acute infarct within the right mid corona radiata. IMAGING:  CXR: 2022  Impression   Redemonstration of right apical lung mass. CT HEAD: 2022  Impression   Likely acute infarct within the right mid corona radiata. CTA HEAD/NECK: 2022  Impression   No significant interval change compared to the prior exam from 2022. No large vessel occlusion visualized. BRAIN MRI: 2022  Impression   1. There is an acute infarct involving the right posterior frontal corona   radiata extending along the right basal ganglia. No mass effect or midline   shift. 2. Otherwise, no acute intracranial abnormality.    3. Mild global parenchymal volume loss with mild chronic microvascular   ischemic changes. 4. Aerated secretions are seen in the left sphenoid sinus. MBS: 8/23/2022  Moderate oral stage dysphagia characterized by decreased mastication and decreased lingual manipulation. Prolonged mastication with soft and moist solids; concern for potential excess labor with soft solids. Prolonged bolus formation with all (textured solids>puree, liquids)  Prolonged oral transit with all. Premature bolus loss to the pharynx with all. Mild oral residue with thick liquids, puree, and textured solids; clears with repeat swallow independent of cueing. Mild-moderate pharyngeal stage dysphagia characterized by delayed swallow and decreased laryngeal elevation. Premature spillage to the valleculae with all. Mild vallecular residue with thick liquids, puree, and textured solids; clears with repeat swallow independent of cueing  Mild-mod pyriform residue with soft solid is cleared with immediate repeat swallow independent of cueing. Patient denies using straws and only takes small sips. Max encouragement to accept trial via straw x1 and to administer large thin bolus for assessment purposes: deep penetration with large, thin bolus is not cleared; no penetration/aspiration with small sip thin via straw. Concern for reduced esophageal motility based on limited view during Modified Barium Swallow. Recommend minced/moist with thin liquids using small bites/sips only; meds crushed in puree/pudding. Patient adamantly declines rec for minced/moist (\"I will not eat that\"); defer to MD re: continued current diet vs downgrade; rec close monitor for tolerance if continues with soft/bite-size. Subjective:     Current diet: Soft/Bite-size;  Thin  Pt/staff statements regarding diet tolerance: Reduced intake reported; daughter requesting oral supplement; patient reports distaste for current diet d/t too difficulty, but repeatedly declining offered/encouraged downgrade to minced/moist    Cognitive-communication treatment progress: persistent dysarthria improved since initial eval; concern for reduced insight at times    Objective: Current Therapy Impression of progress/goal status    IMPRESSIONS  Cognitive Diagnosis:   Continued focus on dysphagia. Oriented x4; recalls general events/information with min cues; concern for reduced insight. Aphasia Diagnosis:   Continue focus on dysphagia this date. Adequate word-finding and thought organization for self-expression for daily needs; follows simple dx; answers simple questions. Speech Diagnosis:   Mild-moderate dysarthria characterized by reduced breath support for phonation and volume as well as moderately reduced (L>R) labial and buccal strength and ROM and mildly reduced lingual strength and ROM. ~75% speech intelligibility in prolonged conversation    Dysphagia Diagnosis:  Appears comparable to above noted MBS. Moderate oral stage dysphagia and mild-moderate pharyngeal stage dysphagia characterized by persistent decreased mastication, decreased lingual manipulation, delayed swallow, and decreased laryngeal elevation. Textured solids not observed this date d/t patient report soft/bite-size items are too hard to chew. Repeated offered minced/moist followed by education on benefit for reduced effort/labor and increased potential intake, but patient repeatedly declining minced/moist (as well as puree) stating \"I don't want wet food\". Plan to continue to defer downgrade to MD at this time as well as continue to reinforce benefit to downgrade. Independent for small sips. No overt signs/symptoms of penetration/aspiration. Recommended Diet:  Solid consistency: Minced and Moist (declines rec; request to continue soft/bite-sized)  Liquid consistency:  Thin (SMALL SIPS)  Liquid administration via: Cup  Medication administration: Meds in puree    Compensatory Strategies:   Compensatory Swallowing Strategies : Upright as possible for all oral intake, Remain upright for 30-45 minutes after meals, Eat/Feed slowly, Small bites/sips, Swallow 2 times per bite/sip      Status of Dysphagia Goals: The patient will tolerate recommended diet without observed clinical signs of aspiration, concern for poor tolerance with soft  The patient/caregiver will demonstrate understanding of compensatory strategies for improved swallowing safety. , continue  The patient will improve oral motor function via therapeutic oral motor exercises to 5/5 each trial., not addressed  The patient will improve oral preparation phase via bolus control/manipulation exercises to 5/5 each trial., not addressed  The patient will tolerate soft and bite sized foods 10/10. Not met - declines d/t difficulty, but requests to continue as current diet    Goal Status: Speech and Language Goals  Goals:   Short-term Goals  Goal 1: Patient will improve conversational speech intelligibility to >90% via comp strats, breath support and oral motor strengthening tasks completed 10/10 continue  Goal 2: Patient will tolerate ongoing evaluation with additional goals to be determined as appropriate continue    Prognosis  Speech Therapy Prognosis  Prognosis: Guarded  Prognosis Considerations: Participation Level; Co-Morbidities; Motivation    Education  Consulted and agree with results and recommendations: Patient, RN  Patient Education: completed on results/recs/plan  Patient Education Response: Verbalizes understanding, Needs reinforcement      Discharge Recommendations:  Pt will benefit from continued skilled Speech Therapy for Speech and Dysphagia services, prior to returning home. Please accept this as Speech Therapy Discharge status, if pt is discharged prior to next therapy session.     Objective: Assessment/Therapy activities    Treatment this session  Objective:  COMPREHENSION  Auditory Comprehension: [x]WFL for concrete needs []Mild   [] Moderate  []Severe []To be assessed    EXPRESSION  Verbal Expression: [x]WFL []Mild   [] Moderate  []Severe  []To be assessed   Pragmatics/Social Functioning: [x]WFL []Mild   [] Moderate  []Severe  []To be assessed      MOTOR SPEECH  Motor Speech: []WFL []Mild   [x] Moderate  []Severe  []To be assessed   []Apraxia   [x]Dysarthria   [x]Decreased Intelligibility  Introduced comp strats for improved speech intelligibility    VOICE  Voice: []WFL [x]Mild   [] Moderate  []Severe  []To be assessed  Reduced breath support for volume    COGNITION  []Unable to be assessed secondary to Aphasia     Overall Orientation : [x]WFL []Mild   [] Moderate  []Severe  []To be assessed   []Unable to be assessed secondary to Aphasia     Attention: [x]WFL for sustained []Mild   [] Moderate  []Severe  []To be assessed    Memory: []WFL [x]Mild for recall of daily info  [] Moderate  []Severe  []To be assessed    Problem Solving: []WFL []Mild   [] Moderate  []Severe  [x]To be assessed    Safety/Judgement: []WFL []Mild   [x] Moderate  []Severe  []To be assessed  Impaired Insight  Impaired Flexibility of thought    DYSPHAGIA TREATMENT   Positioning   Patient Positioning: Upright in bed (leans R) but repositions to midline easily with pillow support    PO Trials: lunch tray present (pudding, mashed potatoes, diced chicken, coffee); only accepts pudding and coffee  Thin Liquids: independent for small sips; suspect premature bolus loss; delayed swallow; decreased laryngeal elevation; no overt signs/symptoms  Nectar thick liquids  Honey Thick liquids  Puree: suspect premature bolus loss; delayed swallow; decreased laryngeal elevation; no overt signs/symptoms   Minced/Moist food: declined  Soft food: declined  Regular food    Dysphagia Tx:   Direct Dysphagia tx: PO trials as described above; self-limiting to puree but declines downgrade from soft to minced or puree  Dysphagia ex: focus on diet tolerance and dysphagia education re: rationale for current rec for minced/moist diet and potential benefits if downgraded  Training in compensatory strategies: independent for positioning and small sips, but needs continued reinforcement for consideration for diet texture modification  Pt response to ex/training: concern for reduced insight to dysphagia education/learning re: current recs/rationale for modified diet; needs continued education/reinforcement; utilized examples of self-limiting intake and potential benefit for downgrade for increased options, but continues to decline downgrade      Plan     Plan/Recommendations:   Speech therapy 3-5 times a weeks for speech-language treatment, and dysphagia treatment   Interventions: Therapeutic Interventions: Diet tolerance monitoring, Patient/Family education, Bolus control exercises, Oral motor exercises, Laryngeal exercises, Pharyngeal exercises, Therapeutic PO trials with SLP ; Motor speech remediation, Cognitive-communication     Barriers toward progress toward pt goals:  Cognitive deficit, Decreased motivation, and Depression    SLP Individual Minutes  Time In: 1324  Time Out: 1400  Minutes: 36    Timed Code Treatment: 15  Total Treatment Time: 21     Signed:  Linda Patel, [address] [facility], [id number]  Speech-Language Pathologist  Portable phone: [phone]  08/24/22 2:00 PM

## 2022-08-24 NOTE — PROGRESS NOTES
Occupational Therapy  Facility/Department: [address] PROGRESSIVE CARE  Occupational Therapy Treatment and Tentative D/C      Name: Ansley Lloyd  : 1950  MRN: [id number]  Date of Service: 2022    Discharge Recommendations: Ansley Lloyd scored a 13/ on the AM-PAC ADL Inpatient form. Current research shows that an AM-PAC score of 17 or less is typically not associated with a discharge to the patient's home setting. Based on the patient's AM-PAC score and their current ADL deficits, it is recommended that the patient have 5-7 sessions per week of Occupational Therapy at d/c to increase the patient's independence. At this time, this patient demonstrates complex nursing, medical, and rehabilitative needs, and would benefit from intensive rehabilitation services upon discharge from the Inpatient setting. This patient demonstrates the ability to participate in and benefit from an intensive therapy program with a coordinated interdisciplinary team approach to foster frequent, structured, and documented communication among disciplines, who will work together to establish, prioritize, and achieve treatment goals. Please see assessment section for further patient specific details. If patient discharges prior to next session this note will serve as a discharge summary. Please see below for the latest assessment towards goals. 5-7 sessions per week, Continue to assess pending progress, Patient would benefit from continued therapy after discharge  OT Equipment Recommendations  Equipment Needed: No       Patient Diagnosis(es): The primary encounter diagnosis was Cerebrovascular accident (CVA) due to occlusion of right middle cerebral artery ([address] Diagnoses of Left hemiparesis ([address] and Facial droop were also pertinent to this visit. Past Medical History:  has no past medical history on file. Past Surgical History:  has no past surgical history on file.            Assessment   Performance deficits / Impairments: Decreased functional mobility ; Decreased balance;Decreased ADL status; Decreased high-level IADLs;Decreased endurance;Decreased ROM; Decreased strength;Decreased fine motor control;Decreased safe awareness;Decreased cognition;Decreased coordination  Assessment: Pt tolerated session well. Pt completes bed mobility with Max A. Pt completes transfers with Min/Mod A with use of stedy. Pt completes weight bearing to B elbows with increased assist. Pt with no noted AROM in L UE. Continue with POC. Prognosis: [address] / Modification: stedy  REQUIRES OT FOLLOW-UP: Yes  Activity Tolerance  Activity Tolerance: Patient Tolerated treatment well;Patient limited by fatigue        Plan   Plan  Times per Week: 3-5x  Current Treatment Recommendations: Strengthening, ROM, Balance training, Functional mobility training, Endurance training, Patient/Caregiver education & training, Neuromuscular re-education, Safety education & training, Self-Care / ADL, Equipment evaluation, education, & procurement     Restrictions  Restrictions/Precautions  Restrictions/Precautions: Fall Risk  Position Activity Restriction  Other position/activity restrictions: L Hemiparesis. Subjective   General  Chart Reviewed: Yes  Patient assessed for rehabilitation services?: Yes  Additional Pertinent Hx: per ED note, Obinna Willis is a 66 y.o. female who presents to the emergency department with EMS for evaluation of worsening left-sided weakness over the past 4 days. Patient was seen on 8/16/2022 in the emergency room and diagnosed with TIA as well as atrial fibrillation. Recommendation was for admission at that time but patient signed out AMA. Patient states that since she been home she has been having progressive left-sided weakness and is now no longer able to move her left upper lower extremity. Patient also has profound left-sided facial droop. Patient denies any numbness or weakness in the right side of the body.   States difficulty completing; completed x5 reps each side)  Standing: With support (stedy; CGA/Min A)  Transfer Training  Transfer Training: Yes  Overall Level of Assistance: Minimum assistance; Moderate assistance; Total assistance (stedy)  Sit to Stand: Minimum assistance; Moderate assistance; Total assistance (stedy)  Stand to Sit: Minimum assistance; Moderate assistance  Bed to Chair: Minimum assistance; Moderate assistance; Total assistance (stedy)     AROM: Grossly decreased, non-functional (flaccid L UE; some noted AROM in traps)  PROM: Within functional limits  Strength: Grossly decreased, non-functional (flaccid L UE)  Coordination: Grossly decreased, non-functional (flaccid L UE)  Tone: Abnormal (flaccid L UE)  Sensation:  (Pt reports intact sensation although difficulty assessing)  ADL  Additional Comments: Pt with no ADL needs at this time              Hearing  Hearing: Within functional limits  Cognition  Overall Cognitive Status: Exceptions  Arousal/Alertness: Appropriate responses to stimuli  Following Commands:  Follows one step commands with increased time  Attention Span: Appears intact  Initiation: Requires cues for some  Sequencing: Requires cues for some  Orientation  Overall Orientation Status: Within Functional Limits               PROM Exercises: completed PROM to L UE including shoulder, elbow, wrist, and hand/fingers; completed x10 reps; no  noted AROM  Education Given To: Patient  Education Provided: Role of Therapy;Transfer Training;Plan of Care;Home Exercise Program  Education Method: Demonstration;Verbal  Barriers to Learning: Cognition  Education Outcome: Continued education needed          AM-PAC Score        AM-PAC Inpatient Daily Activity Raw Score: 13 (08/24/22 1152)  AM-PAC Inpatient ADL T-Scale Score : 32.03 (08/24/22 1152)  ADL Inpatient CMS 0-100% Score: 63.03 (08/24/22 1152)  ADL Inpatient CMS G-Code Modifier : CL (08/24/22 1152)    Tinneti Score       Goals  Short Term Goals  Time Frame for Short term goals: prior to D/C; ongoing 8/24  Short Term Goal 1: complete bed mobility with Min A  Short Term Goal 2: complete transfers with Min A  Short Term Goal 3: complete bathing and dressing with Mod A  Short Term Goal 4: complete toileting with Mod A  Short Term Goal 5: complete grooming with setup  Long Term Goals  Time Frame for Long term goals : STG=LTG  Patient Goals   Patient goals : increase independence       Therapy Time   Individual Concurrent Group Co-treatment   Time In 1110         Time Out 1150         Minutes 40         Timed Code Treatment Minutes: 40 Minutes       Mu Moncada OTR/L

## 2022-08-24 NOTE — PROGRESS NOTES
[facility] of [facility]  Dr. Angel Carrasco Progress Note  8/24/2022  10:10 AM    Patient Name:   Janice Moreno  YOB: 1950    Diagnosis: Acute cerebrovascular accident (CVA) due to occlusion of right middle cerebral artery [facility])        Subjective: Rehab consult follow-up. Chart reviewed. Patient discussed with our rehab unit admission nurse. 66 y.o. female with recent diagnosis of right posterior frontal corona radiata infarction extending along the right basal ganglia. Patient has [facility], and they denied her initial yesterday for rehab unit treatment, but this was overturned on appeal this morning by Dr. Pennie Saenz.       /78   Pulse 88   Temp 98.2 °F (36.8 °C) (Oral)   Resp 16   Ht 5' 6\" (1.676 m)   Wt 133 lb 2.5 oz (60.4 kg)   SpO2 97%   BMI 21.49 kg/m²     Last 24 hour lab  Recent Results (from the past 24 hour(s))   CBC with Auto Differential    Collection Time: 08/24/22  5:28 AM   Result Value Ref Range    WBC 7.9 4.0 - 11.0 K/uL    RBC 4.18 4.00 - 5.20 M/uL    Hemoglobin 12.2 12.0 - 16.0 g/dL    Hematocrit 36.7 36.0 - 48.0 %    MCV 87.6 80.0 - 100.0 fL    MCH 29.1 26.0 - 34.0 pg    MCHC 33.2 31.0 - 36.0 g/dL    RDW 15.5 (H) 12.4 - 15.4 %    Platelets 574 798 - 424 K/uL    MPV 7.9 5.0 - 10.5 fL    Neutrophils % 88.7 %    Lymphocytes % 4.4 %    Monocytes % 6.1 %    Eosinophils % 0.6 %    Basophils % 0.2 %    Neutrophils Absolute 7.0 1.7 - 7.7 K/uL    Lymphocytes Absolute 0.3 (L) 1.0 - 5.1 K/uL    Monocytes Absolute 0.5 0.0 - 1.3 K/uL    Eosinophils Absolute 0.0 0.0 - 0.6 K/uL    Basophils Absolute 0.0 0.0 - 0.2 K/uL   Basic Metabolic Panel w/ Reflex to MG    Collection Time: 08/24/22  5:28 AM   Result Value Ref Range    Sodium 136 136 - 145 mmol/L    Potassium reflex Magnesium 3.8 3.5 - 5.1 mmol/L    Chloride 104 99 - 110 mmol/L    CO2 22 21 - 32 mmol/L    Anion Gap 10 3 - 16    Glucose 100 (H) 70 - 99 mg/dL    BUN 18 7 - 20 mg/dL    Creatinine

## 2022-08-24 NOTE — CARE COORDINATION
Dr Terese Cook was able to complete peer to peer and tells me it was approved. Await confirmation via [employer] portal. Relayed this information to [facility] in ARU. MD says possible discharge today pending need for further work up.   Electronically signed by Leopoldo Zepeda RN Case Management [phone] on 8/24/2022 at 9:45 AM

## 2022-08-24 NOTE — PROGRESS NOTES
Discharge orders acknowledged by RN. Handoff report called to ARU RN. IV access to R hand lost. Pt not on IV meds, ARU RN advised that pt was okay to transport without IV access.

## 2022-08-24 NOTE — PLAN OF CARE
Problem: Discharge Planning  Goal: Discharge to home or other facility with appropriate resources  Outcome: Progressing  Flowsheets (Taken 8/24/2022 1158)  Discharge to home or other facility with appropriate resources:   Identify barriers to discharge with patient and caregiver   Arrange for needed discharge resources and transportation as appropriate   Identify discharge learning needs (meds, wound care, etc)   Refer to discharge planning if patient needs post-hospital services based on physician order or complex needs related to functional status, cognitive ability or social support system     Problem: Safety - Adult  Goal: Free from fall injury  Outcome: Progressing  Flowsheets (Taken 8/24/2022 1158)  Free From Fall Injury: Instruct family/caregiver on patient safety     Problem: ABCDS Injury Assessment  Goal: Absence of physical injury  Outcome: Progressing  Flowsheets  Taken 8/24/2022 1158  Absence of Physical Injury: Implement safety measures based on patient assessment  Taken 8/24/2022 1153  Absence of Physical Injury: Implement safety measures based on patient assessment     Problem: Skin/Tissue Integrity  Goal: Absence of new skin breakdown  Description: 1. Monitor for areas of redness and/or skin breakdown  2. Assess vascular access sites hourly  3. Every 4-6 hours minimum:  Change oxygen saturation probe site  4. Every 4-6 hours:  If on nasal continuous positive airway pressure, respiratory therapy assess nares and determine need for appliance change or resting period.   Outcome: Progressing     Problem: Neurosensory - Adult  Goal: Achieves stable or improved neurological status  Outcome: Progressing  Flowsheets (Taken 8/24/2022 1158)  Achieves stable or improved neurological status:   Assess for and report changes in neurological status   Initiate measures to prevent increased intracranial pressure   Maintain blood pressure and fluid volume within ordered parameters to optimize cerebral perfusion and minimize risk of hemorrhage   Monitor temperature, glucose, and sodium.  Initiate appropriate interventions as ordered     Problem: Neurosensory - Adult  Goal: Achieves maximal functionality and self care  Outcome: Progressing  Flowsheets (Taken 8/24/2022 1158)  Achieves maximal functionality and self care:   Monitor swallowing and airway patency with patient fatigue and changes in neurological status   Encourage and assist patient to increase activity and self care with guidance from physical therapy/occupational therapy     Problem: Chronic Conditions and Co-morbidities  Goal: Patient's chronic conditions and co-morbidity symptoms are monitored and maintained or improved  Outcome: Progressing  Flowsheets (Taken 8/24/2022 1158)  Care Plan - Patient's Chronic Conditions and Co-Morbidity Symptoms are Monitored and Maintained or Improved:   Monitor and assess patient's chronic conditions and comorbid symptoms for stability, deterioration, or improvement   Collaborate with multidisciplinary team to address chronic and comorbid conditions and prevent exacerbation or deterioration   Update acute care plan with appropriate goals if chronic or comorbid symptoms are exacerbated and prevent overall improvement and discharge

## 2022-08-25 LAB
ANION GAP SERPL CALCULATED.3IONS-SCNC: 10 MMOL/L (ref 3–16)
BUN BLDV-MCNC: 13 MG/DL (ref 7–20)
CALCIUM SERPL-MCNC: 8.7 MG/DL (ref 8.3–10.6)
CHLORIDE BLD-SCNC: 103 MMOL/L (ref 99–110)
CO2: 25 MMOL/L (ref 21–32)
CREAT SERPL-MCNC: 0.5 MG/DL (ref 0.6–1.2)
GFR AFRICAN AMERICAN: >60
GFR NON-AFRICAN AMERICAN: >60
GLUCOSE BLD-MCNC: 86 MG/DL (ref 70–99)
HCT VFR BLD CALC: 36.2 % (ref 36–48)
HEMOGLOBIN: 12.3 G/DL (ref 12–16)
INR BLD: 1.32 (ref 0.87–1.14)
MAGNESIUM: 1.9 MG/DL (ref 1.8–2.4)
MCH RBC QN AUTO: 29.6 PG (ref 26–34)
MCHC RBC AUTO-ENTMCNC: 34 G/DL (ref 31–36)
MCV RBC AUTO: 87.2 FL (ref 80–100)
PDW BLD-RTO: 15.2 % (ref 12.4–15.4)
PLATELET # BLD: 182 K/UL (ref 135–450)
PMV BLD AUTO: 8.2 FL (ref 5–10.5)
POTASSIUM REFLEX MAGNESIUM: 3.7 MMOL/L (ref 3.5–5.1)
PROTHROMBIN TIME: 16.4 SEC (ref 11.7–14.5)
RBC # BLD: 4.15 M/UL (ref 4–5.2)
SODIUM BLD-SCNC: 138 MMOL/L (ref 136–145)
WBC # BLD: 5.7 K/UL (ref 4–11)

## 2022-08-25 PROCEDURE — 6370000000 HC RX 637 (ALT 250 FOR IP): Performed by: PHYSICAL MEDICINE & REHABILITATION

## 2022-08-25 PROCEDURE — 80048 BASIC METABOLIC PNL TOTAL CA: CPT

## 2022-08-25 PROCEDURE — 97535 SELF CARE MNGMENT TRAINING: CPT

## 2022-08-25 PROCEDURE — 83735 ASSAY OF MAGNESIUM: CPT

## 2022-08-25 PROCEDURE — 1280000000 HC REHAB R&B

## 2022-08-25 PROCEDURE — 97542 WHEELCHAIR MNGMENT TRAINING: CPT

## 2022-08-25 PROCEDURE — 85027 COMPLETE CBC AUTOMATED: CPT

## 2022-08-25 PROCEDURE — 36415 COLL VENOUS BLD VENIPUNCTURE: CPT

## 2022-08-25 PROCEDURE — 92523 SPEECH SOUND LANG COMPREHEN: CPT

## 2022-08-25 PROCEDURE — 85610 PROTHROMBIN TIME: CPT

## 2022-08-25 PROCEDURE — 97116 GAIT TRAINING THERAPY: CPT

## 2022-08-25 PROCEDURE — 97166 OT EVAL MOD COMPLEX 45 MIN: CPT

## 2022-08-25 PROCEDURE — 97162 PT EVAL MOD COMPLEX 30 MIN: CPT

## 2022-08-25 PROCEDURE — 97530 THERAPEUTIC ACTIVITIES: CPT

## 2022-08-25 RX ORDER — FLUOXETINE HYDROCHLORIDE 20 MG/1
20 CAPSULE ORAL DAILY
Status: DISCONTINUED | OUTPATIENT
Start: 2022-08-25 | End: 2022-08-26

## 2022-08-25 RX ADMIN — FLUOXETINE 20 MG: 20 CAPSULE ORAL at 14:06

## 2022-08-25 RX ADMIN — APIXABAN 5 MG: 5 TABLET, FILM COATED ORAL at 09:22

## 2022-08-25 RX ADMIN — ATORVASTATIN CALCIUM 80 MG: 80 TABLET, FILM COATED ORAL at 23:11

## 2022-08-25 RX ADMIN — APIXABAN 5 MG: 5 TABLET, FILM COATED ORAL at 23:11

## 2022-08-25 RX ADMIN — SENNOSIDES, DOCUSATE SODIUM 2 TABLET: 8.6; 5 TABLET ORAL at 23:11

## 2022-08-25 RX ADMIN — METOPROLOL SUCCINATE 50 MG: 50 TABLET, EXTENDED RELEASE ORAL at 09:22

## 2022-08-25 RX ADMIN — CLOPIDOGREL BISULFATE 75 MG: 75 TABLET ORAL at 09:23

## 2022-08-25 ASSESSMENT — PAIN SCALES - GENERAL
PAINLEVEL_OUTOF10: 0
PAINLEVEL_OUTOF10: 3
PAINLEVEL_OUTOF10: 0

## 2022-08-25 ASSESSMENT — PAIN DESCRIPTION - LOCATION: LOCATION: ANKLE

## 2022-08-25 ASSESSMENT — PAIN DESCRIPTION - FREQUENCY: FREQUENCY: INTERMITTENT

## 2022-08-25 ASSESSMENT — PAIN DESCRIPTION - DESCRIPTORS: DESCRIPTORS: SHOOTING

## 2022-08-25 ASSESSMENT — PAIN DESCRIPTION - ORIENTATION: ORIENTATION: LEFT

## 2022-08-25 ASSESSMENT — PAIN DESCRIPTION - PAIN TYPE: TYPE: ACUTE PAIN

## 2022-08-25 NOTE — PROGRESS NOTES
Pt awake and AAO lying in bed. Denies pain. Pt s/p CVA with L umu. Speech can be slurred. Pt is on minced and moist diet with thins. Noted facial droop. Tongue dry. Water given. Encouraged fluids. Pt is flaccid on L. L UE elevated on pillow. LLE very weak to flaccid. Lungs clear. No sob or cough. On RA. On telemetry and in CAF. Belly round and soft with active BS> LBM today. Pt incontinent of B and B. Pericare given and depends changed. Trace edema to lower legs. Heels floated. Skin intact. Call light in reach. Bed alarm on.

## 2022-08-25 NOTE — PROGRESS NOTES
Notified Dr. Ladonna Christensen that patient expressed feelings of depression and requested an antidepressant. Received order for Prozac 20 mg daily. Also, received order for dietitian consult for dietary supplementation.

## 2022-08-25 NOTE — CONSULTS
Comprehensive Nutrition Assessment    Type and Reason for Visit:  Initial, Consult    Nutrition Recommendations/Plan:   Continue current diet per SLP reccs  Begin Ensure Enlive BID     Malnutrition Assessment:  Malnutrition Status:  Insufficient data (08/25/22 1319)    Context:  Chronic Illness       Nutrition Assessment:    RD consult for new admission to rehab and oral nutrition supplements. Admitted to rehab s/p CVA. Limited wt hx provided in EMR. SLP following. On dysphagia soft and bite sized diet with intakes <50% at most meals. Skin intact. Will trial Ensure Enlive and monitor for acceptance. Nutrition Related Findings:    +BM 8/24. Trace BLE edema. Wound Type: None       Current Nutrition Intake & Therapies:    ADULT DIET; Dysphagia - Soft and Bite Sized    Anthropometric Measures:  Height: 5' 6\" (167.6 cm)  Ideal Body Weight (IBW): 130 lbs (59 kg)    Admission Body Weight: 139 lb (63 kg)  Current Body Weight: 135 lb (61.2 kg), 103.8 % IBW. Weight Source: Bed Scale  Current BMI (kg/m2): 21.8  Weight Adjustment For: No Adjustment  BMI Categories: Normal Weight (BMI 18.5-24. 9)    Estimated Daily Nutrient Needs:  Energy Requirements Based On: Kcal/kg  Weight Used for Energy Requirements: Current  Energy (kcal/day): 9092-1198 (25-30kcal/61kg)  Weight Used for Protein Requirements: Current  Protein (g/day): 73-85 (1.2-1.4g/61kg)  Method Used for Fluid Requirements: 1 ml/kcal  Fluid (ml/day): 1 ml/kcal    Nutrition Diagnosis:   Inadequate oral intake related to inadequate protein-energy intake as evidenced by intake 26-50%, intake 0-25%    Nutrition Interventions:   Food and/or Nutrient Delivery: Continue Current Diet, Start Oral Nutrition Supplement  Nutrition Education/Counseling: Education not indicated  Coordination of Nutrition Care: Continue to monitor while inpatient     Goals:  Goals: PO intake 50% or greater     Nutrition Monitoring and Evaluation:   Behavioral-Environmental Outcomes: None Identified  Food/Nutrient Intake Outcomes: Food and Nutrient Intake, Supplement Intake  Physical Signs/Symptoms Outcomes: Chewing or Swallowing, Fluid Status or Edema, Weight, Skin    Discharge Planning:     Too soon to determine     Violeta Damon, [address] [place] [address], LD  Contact: [phone]

## 2022-08-25 NOTE — PROGRESS NOTES
Patient admitted to room 3260 per bed. Patient was oriented to the Call Light, Phone, TV, Thermostat, Bed Controls, Bathroom and Emergency Cord. Patient verbalized and demonstrated understanding of all. Patient was also given an over view of Unit Routines for Acute Rehab, including what to wear for therapy. The patient's role in goal setting was reviewed along with an explanation of the Interdisciplinary Team meeting, the 's role in coordinating services and the Discharge Planning/Continuum of Care process. Patient Rights and Responsibilities were reviewed. Meal times were explained, including how to order food. The white board (used for communication) was pointed out emphasizing  the 3 hours/day Therapy Schedule (posted most evenings), the number (and process) for reporting grievances, and the Doctor's, Nurse's, and PCA's names. It was recommended that any family that will be care givers or any care givers the patient has, take part in therapy. There are no set visiting hours, and it was suggested that non-caregiver friends and family visitors come after therapy (at 4 PM or later) to allow patient to rest in between sessions.

## 2022-08-25 NOTE — PROGRESS NOTES
Occupational Therapy  Facility/Department: [facility]  Rehabilitation Occupational Therapy Evaluation       Date: 22  Patient Name: Padmini Cárdenas       Room: Y4C-2145/2389-67  MRN: [id number]  Account: [de-identified]   : 1950  (76 y.o.) Gender: female     Referring Practitioner: Dr. Cristopher Keen  Diagnosis: CVA  Additional Pertinent Hx: L lung mass, Afib. Pt presented to ED for L sided weakness initially on  TIA suspected and Dr recommended she stay for observation but Pt left AMA, returned days later after significant increasing L sided weakness, acute infarcts found on imaging    Restrictions  Restrictions/Precautions: Fall Risk;Up as Tolerated; Modified Diet  Other position/activity restrictions: L Hemiparesis; diet soft and bite sized; high falls risk    Subjective  Subjective: Met in Pt's room, she is agreeable to complete OT evaluation  Comments: Pt with L hemiparesis unable to move LUE and LLE, L lean in sitting          Vitals  Temp: 97.8 °F (36.6 °C)  Heart Rate: 99  Resp: 18  BP: 127/79  Weight: 135 lb 2.3 oz (61.3 kg)  BMI (Calculated): 21.9  Oxygen Therapy  SpO2: 98 %  O2 Device: None (Room air)  Level of Consciousness: Alert (0)    Objective  Vision - Basic Assessment  Prior Vision: No visual deficits  Visual History: No significant visual history  Patient Visual Report: No visual complaint reported. Vision Comments: head turned to R side, requires max cues to look to L visual field, significant L neglect  Cognition  Overall Cognitive Status: Exceptions  Arousal/Alertness: Appropriate responses to stimuli  Following Commands: Follows one step commands with increased time; Follows one step commands with repetition  Attention Span: Attends with cues to redirect  Memory: Appears intact  Insights: Decreased awareness of deficits  Initiation: Requires cues for some  Sequencing: Requires cues for some  Cognition Comment: Difficulty with left neglect needing cues to attend to left side of objects including meal tray. Orientation  Overall Orientation Status: Within Functional Limits (BIMs 14/15 this date)  Orientation Level: Oriented X4   Perception  Overall Perceptual Status: WFL  Sensation  Overall Sensation Status: Impaired  Sharp/Dull: Severe deficits in the LUE  Proprioception: Severe deficits in the LUE;Severe deficits in the LLE   ROM  LUE AROM (degrees)  LUE AROM : Exceptions  LUE General AROM: LUE very flacid and unable to move actively at any joints, very trace muscle contraction while atempting shoulder elevation, no subluxation noted  RUE AROM (degrees)  RUE AROM : WFL  RUE General AROM: RUE ROM functional at all joints  LUE Strength  Gross LUE Strength: Exceptions to [facility]  L Hand General: 1/5  LUE Strength Comment: 0-1/5, very minimal trace muscle contractions at shoulder  RUE Strength  Gross RUE Strength: WFL  R Hand General: 4/5  Fine Motor Skills  Coordination  Movements Are Fluid And Coordinated: No  Coordination and Movement Description: Left UE       Hand Assessment  Hand Dominance  Hand Dominance: Right  Hand Assessment Comment  Hand Assessment Comment: L hand unable to have any active movement  Functional Mobility  Balance  Sitting Balance: Minimal assistance  Standing Balance: Maximum assistance  Standing Balance  Time: @1min  Activity: At steady in bathroom  Comment: L lean, heavy L neglect  Transfers  Transfer Comments: Used steady in/out of bathroom due to overall weakness and fatigue  Toilet Transfers  Toilet Transfers Comments: Used steady in/out of bathroom due to overall weakness and fatigue  Shower Transfers  Shower Transfers Comments: Unable to get into shower due to poor posture and overall significant weakness  Social/Functional History  Home Layout: Multi-level; Able to Live on Main level with bedroom/bathroom; Laundry in basement (full bathroom every level of house, her bedroom is on main level)  Bathroom Equipment: Hand-held shower (no GB's)  Bathroom Accessibility: [place] accessible (hallways are wide enough, but unsure if bathroom is RW accessible)  Home Equipment: [place] [employer], standard (Family obtained Stand [place] following return home from ER (8/16/2022), BSC)  Receives Help From: Family (2 daughters, myrtle lives close by, Sherrlyn Olszewski lives in [place])  [address]: Independent (Grandson able to A but hadn't been prior)  Homemaking Responsibilities: Yes  Meal Prep Responsibility: Primary  Laundry Responsibility: Primary  Cleaning Responsibility: Primary  Shopping Responsibility: Primary  Leisure & Hobbies: Taking care of my house, scrapbooking (however has not been happy enough to do so lately per her report)  ADL  Feeding: Minimal assistance  Feeding Skilled Clinical Factors: min A to cut up food in small bites, encouragement to eat and follow soft and bite sized diet  Grooming: Moderate assistance  Grooming Skilled Clinical Factors: mod A to open all lids and packets, apply toothpaste to toothbursh, and cues throughout to use items appropriately and stop tasks and move on to next activity (perseration on oral care)  UE Bathing: Maximum assistance  UE Bathing Skilled Clinical Factors: Unable to get into shower stall due to overal weakness and fatigue, used bathing wipes while seated on toilet, max A to bathe neck, back, and LUE  LE Bathing: Maximum assistance  LE Bathing Skilled Clinical Factors: Unable to get into shower stall due to overal weakness and fatigue, used bathing wipes while seated on toilet, max A to bathe lower legs, feet, L thigh, and buttocks  UE Dressing: Maximum assistance  UE Dressing Skilled Clinical Factors: max A to don bra and tshirt, unable to move LUE, able to use RUE to pull up L sleve, able to place head and R arm through sleve, required A to place L arm and pull down on L side and in back  LE Dressing: Dependent/Total  LE Dressing Skilled Clinical Factors:  Total A to don brief and pants, required 2 person A to manage clothing up as Pt stood at steady but was unable to keep balance without max A, total A to don non skid socks  Toileting: Dependent/Total  Toileting Skilled Clinical Factors: Pt required total A to complete hygiene and manage clothing up, unable to reach behind to wipe, LUE flacid on lap, RUE holding onto GB    Goals  Patient Goals   Patient goals : \"increase indepdence to get home and walk around\"  Short Term Goals  Time Frame for Short term goals: Within 1-2 weeks Pt will  Short Term Goal 1: Complete toileting w/ mod A  Short Term Goal 2: Complete bathing seated on BSC/SC with min A  Short Term Goal 3: Complete dressing using AE and umu techniques with mod A  Short Term Goal 4: Complete grooming seated at sink with SBA  Short Term Goal 5: Complete functional transfers via stand pivot w/ mod A  Long Term Goals  Time Frame for Long term goals : Within 2-4 weeks Pt will  Long Term Goal 1: Complete toileting w/ CGA  Long Term Goal 2: Complete bathing seated on BSC/SC using AE with SBA  Long Term Goal 3: Complete dressing using AE and umu techniques with CGA  Long Term Goal 4: Complete grooming seated at sink mod I  Long Term Goal 5: Complete functional transfers via stand pivot w/ CGA  Additional Goals?: Yes  Long Term Goal 6: Complete caregiver education and DME needs prior to discharge    Assessment  Performance deficits / Impairments: Decreased functional mobility ; Decreased balance;Decreased ADL status; Decreased high-level IADLs;Decreased endurance;Decreased ROM; Decreased strength;Decreased fine motor control;Decreased safe awareness;Decreased cognition;Decreased coordination;Decreased posture  Assessment: Pt presented to ER with significant L sided weakness and hemiparesis, acute infarcts found on imaging. PTA she was independent in ambulation and all self care tasks.  She is now requiring max A-dependent with all ADL tasks and is unable to ambulate due to L sided weakness and hemiparesis, decreased propriception of L side and neglect present. She is functioning well below her baseline and would benefit from 2-4 weeks of skilled therapy services prior to being safe to discharge home. Treatment Diagnosis: CVA  Prognosis: Fair  Decision Making: Medium Complexity  Assistance / Modification: Required steady  Discharge Recommendations: Continue to assess pending progress; Patient would benefit from continued therapy after discharge  Plan  Times per Week: 5-7  Times per Day: Daily  Plan Weeks: x2-4 weeks  Current Treatment Recommendations: Strengthening;ROM;Balance training;Functional mobility training; Endurance training;Patient/Caregiver education & training;Neuromuscular re-education; Safety education & training;Self-Care / ADL;Equipment evaluation, education, & procurement;Cognitive/Perceptual training;Coordination training       Therapy Time   Individual Concurrent Group Co-treatment   Time In 1030         Time Out 1200         Minutes 90         Timed Code Treatment Minutes: [address] S/OT FLORENTIN Sanchez/DAPHNE #[id number] provided direct supervision to student and concur with PN

## 2022-08-25 NOTE — PROGRESS NOTES
A complete drug regimen review was completed for this patient.      [x]  No clinically significant medication issue was identified    []   Yes, a clinically significant medication issue was identified     []  Adverse Drug Event:      []  Allergy:      []  Side Effect:      []  Ineffective Therapy:      []  Drug Interaction:     []  Duplicated Therapy:     []  Untreated Indication:      []  Non-adherence:     []  Other:      Nursing/Pharmacy contacted the physician:   Date:  8/24/22  Time: 2200     Actions recommended by physician were completed:  Date :  Time:     Electronically signed by Pearlean Dakin, RN on 8/25/22 at 1:00 AM EDT

## 2022-08-25 NOTE — PLAN OF CARE
ARU PATIENT TREATMENT PLAN  [facility]   [phone]) Navjot Wilson    : 1950  Acct #: [de-identified]  MRN: [id number]   PHYSICIAN:  Gary Harrison MD  Primary Problem    Patient Active Problem List   Diagnosis    Acute cerebrovascular accident (CVA) due to occlusion of right middle cerebral artery (HCC)    Paroxysmal atrial fibrillation ([facility] [address]    Mass of upper lobe of right lung    Right hemisphere, cerebral infarction [facility])       Rehabilitation Diagnosis:     Right hemisphere, cerebral infarction ([facility] [address] [I63.9]       ADMIT DATE:2022    Patient Goals: \"increase indepdence to get home and walk around\"    Admitting Impairments: Acute right corona radiata infarct  - plavix, statin, anticoagulation  - secondary prevention with blood pressure control     Left hemiparesis  - PT/OT  - Positioning     Afib  - rate controlled  - anticoag with eliquis     R apical lung mass  - patient deferring workup       Dysphagia  - slp consulted  Barriers: weakness, endurance, balance  Participation: good     CARE PLAN     NURSING:  Ree Himanshu while on this unit will:     [] Be continent of bowel and bladder     [x] Have an adequate number of bowel movements  [x] Urinate with no urinary retention >300ml in bladder  [] Complete bladder protocol with gregg removal  [x] Maintain O2 SATs at _92__%  [x] Have pain managed while on ARU       [] Be pain free by discharge   [x] Have no skin breakdown while on ARU  [] Have improved skin integrity via wound measurements  [] Have no signs/symptoms of infection at the wound site  [x] Be free from injury during hospitalization   [x] Complete education with patient/family with understanding demonstrated for:CVA  [x] Adjustment   [x] Other:   Nursing interventions may include bowel/bladder training, education for medical assistive devices, medication education, O2 saturation management, energy conservation, stress management techniques, fall prevention, alarms protocol, seating and positioning, skin/wound care, pressure relief instruction,dressing changes,  infection protection, DVT prophylaxis, and/or assistance with in room safety with transfers to bed, toilet, wheelchair, shower as well as bathroom activities and hygiene. Patient/caregiver education for:   [x] Disease/sustained injury/management      [x] Medication Use   [] Surgical intervention   [x] Safety   [x] Body mechanics and or joint protection   [x] Health maintenance         PHYSICAL THERAPY:  Goals:                  Short Term Goals  Time Frame for Short term goals: 1-2 weeks  Short term goal 1: Bed mobility min assist  Short term goal 2: Transfer sit <-> stand min assist  Short term goal 3: Pt will be able to ambulate with LRAD 50' with moderate assistance  Short term goal 4: Pt will be able to ascend/descend 4 steps with unilateral railing mod assistance  Short term goal 5: Pt will be able to ascend/descend curb step LRAD with moderate assistance            Long Term Goals  Time Frame for Long term goals : 3-4 weeks  Long term goal 1: Bed mobility CGA for all aspects  Long term goal 2: Transfer sit <_> stand CGA  Long term goal 3: Pt will be able to ambulated 36' with LRAD CGA-min assist  Long term goal 4: wheelchair mobility 150' with LRAD supervision  Long term goal 5: Ascend/descend 12 steps with unilateral railing min assist  Additional Goals?: Yes  Long term goal 6: Ascend/descend curb step with LRAD min assist  These goals were reviewed with this patient at the time of assessment and Obinna Willis is in agreement. Plan of Care: Pt to be seen 90   mins per day for 5 day/week 3-4 weeks.                    Current Treatment Recommendations: Strengthening, Balance training, Functional mobility training, Transfer training, Neuromuscular re-education, Safety education & training, Patient/Caregiver education & training, ADL/Self-care training, Gait training, Wheelchair mobility training, Endurance training, Home exercise program, Equipment evaluation, education, & procurement, Stair training, Positioning      OCCUPATIONAL THERAPY:  Goals:             Short Term Goals  Time Frame for Short term goals: Within 1-2  weeks Pt will  Short Term Goal 1: Complete toileting w/ mod A  Short Term Goal 2: Complete bathing seated on BSC/SC with min A  Short Term Goal 3: Complete dressing using AE and umu techniques with mod A  Short Term Goal 4: Complete grooming seated at sink with SBA  Short Term Goal 5: Complete functional transfers via stand pivot w/ mod A :  Long Term Goals  Time Frame for Long term goals : Within 2-4 weeks Pt will  Long Term Goal 1: Complete toileting w/ CGA  Long Term Goal 2: Complete bathing seated on BSC/SC using AE with SBA  Long Term Goal 3: Complete dressing using AE and umu techniques with CGA  Long Term Goal 4: Complete grooming seated at sink mod I  Long Term Goal 5: Complete functional transfers via stand pivot w/ CGA  Additional Goals?: Yes  Long Term Goal 6: Complete caregiver education and DME needs prior to discharge : These goals were reviewed with this patient at the time of assessment and Khoi Moreno is in agreement    Plan of Care:  Pt to be seen 90   mins per day for 5-7 day/week x 2-4 weeks. SPEECH THERAPY: Goals will be left blank if speech is not following this patient. Goals:             Short-term Goals  Timeframe for Short-term Goals: Pt goal is to eat more mores and less wet foods              Dysphagia Goals: The patient will tolerate recommended diet without observed clinical signs of aspiration, The patient/caregiver will demonstrate understanding of compensatory strategies for improved swallowing safety. , The patient will improve oral preparation phase via bolus control/manipulation exercises to 5/5 each trial.                            Short-term Goals  Timeframe for Short-term Goals: Pt's goal is to get home and care for self  Goal 1: Pt will participate in ongoing assessment of VPS/PS for adv DL tasks requiring higher level cognitive-linguistic skills for executive fucntion with additional goals PRN  Goal 2: Pt will demonstrate improved recall of daily events; learned safety strategies/techniques and new information wtih use of compensatory strategies and <mild assist  Goal 3: Pt will demonstrate functional VPS/PS for daily living situations and participation in adv DL situations with mild assist              Timeframe for Short-term Goals: Pt's goal is to get home and care for self  These goals were reviewed with this patient at the time of assessment and Vicenta Downs is in agreement    Plan of Care: Pt to be seen 45   mins per day for 5 day/week 2-4 weeks. CASE MANAGEMENT:  Goals:   Assist patient/family with discharge planning, patient/family counseling,   and coordination with insurance during ARU stay. Admission Period/Goal QIM CODES   QIM  Admit/Goal Score    Eating     Oral Hygiene     Toileting Hygiene     Shower/Bathe Self     Upper Body Dressing     Lower Body Dressing     Putting on/Taking off Footwear     Roll Left and Right     Sit to Lying     Lying to Sitting on Side of Bed     Sit to Stand     Chair/Bed to Chair Transfer     Toilet Transfer     Car Transfer     Walk 10 feet     Walk 50 feet with 2 Turns     Walk 150 feet with 2 turns     Walk 10 feet on Uneven Surfaces     1 Step     4 Steps     12 Steps     Picking up Object     Wheel 50 feet with 2 Turns   Type? Wheel 150 feet with 2 Turns   Type? Vicenta Downs will be seen a minimum of 3 hours of therapy per day, a minimum of 5 out of 7 days per week. [] In this rare instance due to the nature of this patient's medical involvement, this patient will be seen 15 hours per week (900 minutes within a 7 day period).     Treatments may include therapeutic exercises, gait training, neuromuscular re-ed, transfer training, community reintegration, bed mobility, w/c mobility and training, self care, home mgmt, cognitive training, energy conservation,dysphagia tx, speech/language/communication therapy, group therapy, and patient/family education. In addition, dietician/nutritionist may monitor calorie count as well as intake and collaboratively work with SLP on dietary upgrades. Neuropsychology/Psychology may evaluate and provide necessary support. Medical issues being managed closely and that require 24 hour availability of a physician:   [x] Swallowing Precautions  [x] Bowel/Bladder Fx  [] Weight bearing precautions   [] Wound Care    [x] Pain Mgmt   [] Infection Protection   [x] DVT Prophylaxis   [x] Fall Precautions  [x] Fluid/Electrolyte/Nutrition Balance   [x] Voice Protection   [x] Respiratory  [] Other:    Medical Prognosis: [] Good  [x] Fair    [] Guarded   Total expected IRF days 24 days  Anticipated discharge destination:    [] Home Independently   [x] Home Modified Independent  [] Home with supervision    []SNF     [] Other                                           Physician anticipated functional outcomes: Mod I for functional mobility and ADLs   IPOC brief synthesis: 66year old female admitted on 8/20 with complaints of worsening left sided weakness after previously leaving AMA from the ED with complaints of left sided weakness. CT was suggestive of right corona radiata infarct, and MRI confirmed the presence of a right posterior frontal corona radiata infarct. Neurology was consulted with recommendations for anticoagulation for her afib.  She was admitted with the above goal.          I have reviewed this initial plan of care and agree with its contents:    Title   Name    Date    Time    Physician: Electronically signed by Dewayne Escobedo MD on 8/26/2022 at 3:43 PM      Case RABIA Ashby [address] [place] [address]     Case Management   [phone]    8/26/2022  12:15 PM      OT: Donavan LERMA/Kumar SERNA James Boyd S/OT    PT: Lacy Ramos PT, DPT [id number] 08/25/22 4:13 PM      RN: Steff Dorsey RN, CRRN    ST:  Jn Fiore. Jayde,MS,CCC,SLP [id number] Speech and Language Pathologist signed 8/26/2022 at [phone]; and 8/26/2022 at 1215 pm      ARU Supervisor: Steff Dorsey RN, [facility] 8/25/2022    Other:

## 2022-08-25 NOTE — PROGRESS NOTES
Pt requesting to urinate. Refused the bedpan. Pt up from lying to sitting with max assist. Pt s/p CVA with L umu and heavy lean to L and poor trunk control. Up from sit to stand with GB and stedy x2 person assist. Pt incontinent of urine and finished void on toilet. Pt has L side neglect and has head turned to R side unless cued to look Left. Pt requesting purewick and explained that using Noy Marcela is discouraged to promote mobility and continence for discharge. Pt stated she did not understand this floor. Explained that this is rehab and mobility and promoting independence at home is a goal. Pt stated she would be using a purewick at home so she would like to use it at hs on rehab. Pt set up with purewick upon return to bed.

## 2022-08-25 NOTE — PROGRESS NOTES
4 Eyes Skin Assessment     NAME:  Dorian Landers OF BIRTH:  1950  MEDICAL RECORD NUMBER:  [id number]    The patient is being assess for  Admission    I agree that 2 RN's have performed a thorough Head to Toe Skin Assessment on the patient. ALL assessment sites listed below have been assessed. Areas assessed by both nurses:    Head, Face, Ears, Shoulders, Back, Chest, Arms, Elbows, Hands, Sacrum. Buttock, Coccyx, Ischium, and Legs. Feet and Heels        Does the Patient have a Wound?  No noted wound(s)       River Prevention initiated:  Yes   Wound Care Orders initiated:  No    Pressure Injury (Stage 3,4, Unstageable, DTI, NWPT, and Complex wounds) if present place referral/consult order under [de-identified] No    New and Established Ostomies if present place consult order under : No      Nurse 1 eSignature: Electronically signed by Yesy Salinas RN on 8/24/22 at 10:33 PM EDT    **SHARE this note so that the co-signing nurse is able to place an eSignature**    Nurse 2 eSignature: Electronically signed by Duane Mcnair RN on 8/25/22 at 1:04 AM EDT

## 2022-08-25 NOTE — PROGRESS NOTES
Patient admitted to rehab with CVA. A/Ox4. Transfers with gait belt and stedy x1-2. Mobility restrictions: heavy left lean, flaccid on left side. On soft and bite-sized diet, tolerating well. Medications taken whole in applesauce. On eliquis for DVT prophylaxis. Skin: WLN. Oxygen: on RA. LDA: none. Has been incontinent at times of bowel and incontinent at times of bladder. LBM 8/24. Chair/bed alarms in use and call light in reach. Will monitor for safety.

## 2022-08-25 NOTE — PROGRESS NOTES
Physical Therapy  Facility/Department: [facility]  Rehabilitation Physical Therapy Initial Assessment    NAME: Ansley Lloyd  : 1950 (56 y.o.)  MRN: [id number]  CODE STATUS: Limited    Date of Service: 22      History reviewed. No pertinent past medical history. History reviewed. No pertinent surgical history. Chart Reviewed: Yes  Patient assessed for rehabilitation services?: Yes  Additional Pertinent Hx: \"67 y.o. female with recent diagnosis of a-fib presented to our hospital with worsening left upper and lower ext weakness. Pt was seen in the ED 22 and was found to be in a-fib. She progressed in her left-sided weakness, and had a profound facial droop. She is not able to move left upper or lower extremity at all. Work-up with a CT scan of the head revealed likely acute infarct within the right mid alegria radiata. [place] [facility] MRI scan reveals right posterior frontal corona radiata infarction extending along the right basal ganglia. Patient seen by neurology, and they state patient can resume anticoagulation today. Patient started therapies, patient needs min assist of 2 for bed mobility and transfers. Patient needs more therapy for discharge to home safely. Patient has [facility], and will need prior approval for rehab unit treatment. \" (Dr. Jocy Turner 22)  Family / Caregiver Present: Yes (Shravan Hayes)  Referring Practitioner: Dr. Kinjal Ramirez  Referral Date : 22  Diagnosis: CVA with left sided impairments (Acute cerebrovascular accident (CVA) due to occlusion of right middle cerebral artery)  Other (Comment): Pt follows simple commands. General Comment  Comments: Denies pain at this time. Restrictions:  Restrictions/Precautions: Fall Risk;Up as Tolerated; Modified Diet  Position Activity Restriction  Other position/activity restrictions: L Hemiparesis; diet soft and bite sized diet; high falls risk     SUBJECTIVE  Subjective: Pt emotional at beginning of session including crying at one point states has been very down since loss of  to cancer (she was caregiver) and not feeling like she is able to maintain her home the way she one has. States this feeling very down--> informed RN. \"I feel like Shit, frustrated\". Pt agreeable to PT Evaluation at this time. 2nd session attempt supine in bed, reports fatigue but pleasant and agreeable to PT treatment session. Post Treatment Pain Screening       Prior Level of Function:  Social/Functional History  Lives With: Family Lajune Lefort (Cherylene Leas, 35 yr, \"works 3 jobs, in/out a lot\"). )  Type of Home: House  Home Access: Stairs to enter with rails (4-5 steps to enter.)  Entrance Stairs - Number of Steps: 4-5 railings at all entrance  (front railing bilateral, side entrance unilateral railing)  Bathroom Shower/Tub: Tub/Shower unit, Curtain  Bathroom Toilet: Standard  Has the patient had two or more falls in the past year or any fall with injury in the past year?: Yes (five within past years, all without injury)  ADL Assistance: Independent  Ambulation Assistance: Independent (Without assist device prior TIA (8/16/2022); using Stand Max Spore following.)  Transfer Assistance: Independent  Active : Yes  Mode of Transportation: Car  Occupation: Retired  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 Additional Comments: Pt to ER 8/16/2022 with TIA; left AMA. Prior this, pt independent all care/mobility. Pt reports progress L sided weak since return home. OBJECTIVE  Vision  Vision: Within Functional Limits (left neglect maintain gaze towards right)    Hearing  Hearing: Within functional limits    Cognition  Overall Cognitive Status: Exceptions  Arousal/Alertness: Appropriate responses to stimuli  Following Commands: Follows one step commands with increased time; Follows one step commands with repetition  Attention Span: Attends with cues to redirect  Memory: Appears intact  Insights: Decreased awareness of attend to left side with increased time and effort. Transfers  Sit to Stand: Minimal Assistance; Moderate Assistance (pulling up on hallway railing and stair railing after set up of LLE and body posture with VC; X 1 hawllay railing and X 2 at step R railing (fiting bobath sling and performance of step)  Stand to sit: Minimal Assistance; Moderate Assistance  Bed to Chair: Moderate assistance;Maximum assistance (towards right stand pivot with use of arm rest of wheelchair for stability throughout, set up and cues for proper sequencing towards right with blocking of L knee throughout)  Car Transfer: Moderate Assistance;Maximum Assistance (Stand pivot from wheelchair <-> car, towards left mod-max assist, out to w/c towards right mod assist, swinging LLE in.out of car max assist SBA R side. Pt very receptive to VC throughout and with set up cues for left side including promoting gaze to L)  Comment: Set up and cues for set up of all functional transfers, encouragement to promote gaze/looking towards left side with all mobility tasks with increased effort to perform but receptive to verbal/tactile cues with increased time. Balance  Posture: Poor (rotation towards right with left leaning)  Sitting - Static: Good;- (once set up in position reaching out with RUE on EOB able to maintain SBA-CGA)  Sitting - Dynamic: Fair;- (initially mod assist to achieve proper positioning)  Standing - Static: Poor;+  Standing - Dynamic: Poor;+  Comments: Mod assist for standing balance at railing for fitting of sling, not safe to  object from floor at this time. Environmental Mobility  Ambulation  Surface: level tile  Device: [place] rail (right)  Other Apparatus: Dorsiflex Assist;Left; Wheelchair follow (ace wrap)  Assistance: Maximum assistance; Moderate assistance  Quality of Gait: initially flexed posture provided intermittent physical assist to correct with cues for weight shifting towards right, tends to lean towards left in all recpetive to education and set up throughout session, typically mod-max assist of one therapist for all activities but participates well. Recommend continued IP PT to promote increased left sides awareness, improve active movement with left side, improve postural awareness with transfers, promote decreased assistance with all mobility tasks, and promote return to home. Treatment Diagnosis: Impaired functional mobility with significant left sided impairments including decreased activity ROM with neglect. Therapy Prognosis: Fair  Decision Making: Medium Complexity  History: 67 y/o female admit 8/20/2022 with L Facial Droop, L Sided Weakness. CT Head : Likely Acute Infarct R Mid Corona Radiata. MRI pending. Recent to ED 8/16/2022 with TIA (pt left AMA). PMH as noted including A-Fib, TIA (8/16/2022). Independent prior without DME  Exam: Postural impairments, balance impairments, decreased ROM on left side of body, decreased strength, decreased activity tolerance, left neglect  Clinical Presentation: evolving  Barriers to Learning: Diminishe insight extent deficits; left neglect  Treatment Initiated : 8/25/22  Discharge Recommendations: Continue to assess pending progress; Patient would benefit from continued therapy after discharge  PT D/C Equipment  Other: COntinue to assess DME needs with functional mobility progression. PT Equipment Recommendations  Equipment Needed: Yes  Other: COntinue to assess DME needs with functional mobility progression. CLINICAL IMPRESSION   Pt 67 y/o female with inability to return home due to physical impairments following acute CVA, prior to admission patient was highly independent with all functional mobility tasks without DME. This date patient presents with significant impairments to left side of body including ROM deficits, decreased postural awareness, left sided neglect, poor tolerance of activity, decreased ability to stand.  Pt recpetive to education and set up throughout falls;Call light within reach    EDUCATION  Education  Education Given To: Patient; Family  Education Provided: Role of Therapy; Mobility Training; Safety; Family Education;Equipment; Energy Conservation; Fall Prevention Strategies; Plan of Care  Education Provided Comments: expected physical recovery and mental health care following CVA  Education Method: Verbal  Barriers to Learning: Other (Comment) (emotional)  Education Outcome: Continued education needed    ELOS:   Plan weeks: 3-4 weeks      Therapy Time   Individual Concurrent Group Co-treatment   Time In 0820         Time Out 0900         Minutes 40           Timed Code Treatment Minutes: [address]   Time In 1245     Time Out 1345     Minutes Duy  1560, [address] [facility], 22 at 4:08 PM       Marj Herrera PT, DPT [id number] 22 4:08 PM

## 2022-08-25 NOTE — PROGRESS NOTES
08/25/22 1603   Encounter Summary   Encounter Overview/Reason  Volunteer Encounter   Service Provided For: Patient   Last Encounter  08/25/22  (Raffaele Raglanding visit, Communion and prayer )

## 2022-08-25 NOTE — H&P
Patient: Cathy Sawyer  [id number]  Date: 8/25/2022      Chief Complaint: Stroke    History of Present Illness/Hospital Course:  Ms Sandra Jara is a 66year old female admitted on 8/20 with complaints of worsening left sided weakness after previously leaving AMA from the ED with complaints of left sided weakness. CT was suggestive of right corona radiata infarct, and MRI confirmed the presence of a right posterior frontal corona radiata infarct. Neurology was consulted with recommendations for anticoagulation for her afib. Patient this morning is frustrated with her deficits. Prior Level of Function:  Independent    Current Level of Function:  Tom     has no past medical history on file. has no past surgical history on file. reports that she quit smoking about 54 years ago. Her smoking use included cigarettes. She has never used smokeless tobacco.    family history includes Diabetes in her brother and mother.     Current Facility-Administered Medications   Medication Dose Route Frequency Provider Last Rate Last Admin    polyethylene glycol (GLYCOLAX) packet 17 g  17 g Oral Daily PRN Mayuri Reyes MD        atorvastatin (LIPITOR) tablet 80 mg  80 mg Oral Nightly Mayuri Reyes MD        clopidogrel (PLAVIX) tablet 75 mg  75 mg Oral Daily Mayuri Reyes MD        metoprolol succinate (TOPROL XL) extended release tablet 50 mg  50 mg Oral Daily Mayuri Reyes MD        apixaban [facility]) tablet 5 mg  5 mg Oral BID Mayuri Reyes MD   5 mg at 08/24/22 2054    acetaminophen (TYLENOL) tablet 650 mg  650 mg Oral Q4H PRN Mayuri Reyes MD        magnesium hydroxide (MILK OF MAGNESIA) 400 MG/5ML suspension 30 mL  30 mL Oral Daily PRN Mayuri Reyes MD        sennosides-docusate sodium (SENOKOT-S) 8.6-50 MG tablet 2 tablet  2 tablet Oral BID Mayuri Reyes MD        ondansetron [facility]) tablet 4 mg  4 mg Oral Q8H PRN Mayuri Reyes MD           No Known Allergies    Current Facility-Administered Medications   Medication Dose Route Frequency Provider Last Rate Last Admin    polyethylene glycol (GLYCOLAX) packet 17 g  17 g Oral Daily PRN Elaine Reyes MD        atorvastatin (LIPITOR) tablet 80 mg  80 mg Oral Nightly Elaine Reyes MD        clopidogrel (PLAVIX) tablet 75 mg  75 mg Oral Daily Elaine Reyes MD        metoprolol succinate (TOPROL XL) extended release tablet 50 mg  50 mg Oral Daily Elaine Reyes MD        apixaban [facility]) tablet 5 mg  5 mg Oral BID Elaine Reyes MD   5 mg at 08/24/22 2054    acetaminophen (TYLENOL) tablet 650 mg  650 mg Oral Q4H PRN Elaine Reyes MD        magnesium hydroxide (MILK OF MAGNESIA) 400 MG/5ML suspension 30 mL  30 mL Oral Daily PRN Elaine Reyes MD        sennosides-docusate sodium (SENOKOT-S) 8.6-50 MG tablet 2 tablet  2 tablet Oral BID Elaine Reyes MD        ondansetron [facility]) tablet 4 mg  4 mg Oral Q8H PRN Elaine Reyes MD             REVIEW OF SYSTEMS:   CONSTITUTIONAL: negative for fevers, chills, diaphoresis, activity change, appetite change, fatigue, night sweats and unexpected weight change. EYES: negative for blurred vision, eye discharge, visual disturbance and icterus. HEENT: negative for hearing loss, tinnitus, ear drainage, sinus pressure, nasal congestion, epistaxis and snoring. RESPIRATORY: Negative for hemoptysis, cough, sputum production. CARDIOVASCULAR: negative for chest pain, palpitations, exertional chest pressure/discomfort, edema, syncope. GASTROINTESTINAL: negative for nausea, vomiting, diarrhea, constipation, blood in stool and abdominal pain. GENITOURINARY: negative for frequency, dysuria, urinary incontinence, decreased urine volume, and hematuria. HEMATOLOGIC/LYMPHATIC: negative for easy bruising, bleeding and lymphadenopathy. ALLERGIC/IMMUNOLOGIC: negative for recurrent infections, angioedema, anaphylaxis and drug reactions.    ENDOCRINE: negative for weight changes and diabetic symptoms including polyuria, polydipsia and polyphagia. MUSCULOSKELETAL: negative for pain, joint swelling, decreased range of motion and muscle weakness. NEUROLOGICAL: negative for headaches, slurred speech, unilateral weakness. PSYCHIATRIC/BEHAVIORAL: negative for hallucinations, behavioral problems, confusion and agitation. All pertinent positives are noted in the HPI. Physical Examination:  Vitals: Patient Vitals for the past 24 hrs:   BP Temp Temp src Pulse Resp SpO2 Height Weight   08/25/22 0615 (!) 149/89 98.2 °F (36.8 °C) Axillary 69 16 92 % -- 135 lb 2.3 oz (61.3 kg)   08/25/22 0052 125/74 97.9 °F (36.6 °C) Oral 90 16 95 % -- --   08/24/22 1918 136/83 98.3 °F (36.8 °C) Oral (!) 108 16 96 % 5' 6\" (1.676 m) 139 lb 1.8 oz (63.1 kg)     Psych: Stable mood, normal judgement, normal affect   Const: No distress  Eyes: Conjunctiva noninjected, no icterus noted; pupils equal, round, and reactive to light. HENT: Atraumatic, normocephalic; Oral mucosa moist  Neck: Trachea midline, neck supple. No thyromegaly noted. CV: Regular rate and rhythm, no murmur rub or gallop noted  Resp: Lungs clear to auscultation bilaterally, no rales wheezes or ronchi, no retractions. Respirations unlabored. GI: Soft, nontender, nondistended. Normal bowel sounds. No palpable masses. Neuro: Alert, oriented, appropriate. Slightly dysarthric speech. Left facial droop with dense left hemiparesis. Skin: Normal temperature and turgor  MSK: No joint abnormalities noted. Ext: No significant edema appreciated. No varicosities.     Lab Results   Component Value Date    WBC 5.7 08/25/2022    HGB 12.3 08/25/2022    HCT 36.2 08/25/2022    MCV 87.2 08/25/2022     08/25/2022     Lab Results   Component Value Date    INR 1.32 (H) 08/25/2022    INR 1.13 08/20/2022    INR 0.98 08/16/2022    PROTIME 16.4 (H) 08/25/2022    PROTIME 14.5 08/20/2022    PROTIME 12.9 08/16/2022     Lab Results   Component Value Date    CREATININE 0.5 (L) 08/25/2022 BUN 13 08/25/2022     08/25/2022    K 3.7 08/25/2022     08/25/2022    CO2 25 08/25/2022     Lab Results   Component Value Date    ALT <5 (L) 08/20/2022    AST 17 08/20/2022    ALKPHOS 137 (H) 08/20/2022    BILITOT 0.6 08/20/2022     Fluoroscopy modified barium swallow with video   Final Result   No evidence of aspiration. Penetration with large thin bolus. Reduced   motility. Please see separate speech pathology report for full discussion of findings   and recommendations. MRI brain without contrast   Final Result   1. There is an acute infarct involving the right posterior frontal corona   radiata extending along the right basal ganglia. No mass effect or midline   shift. 2. Otherwise, no acute intracranial abnormality. 3. Mild global parenchymal volume loss with mild chronic microvascular   ischemic changes. 4. Aerated secretions are seen in the left sphenoid sinus. CTA HEAD NECK W CONTRAST   Final Result   No significant interval change compared to the prior exam from 08/16/2022. No large vessel occlusion visualized. RECOMMENDATIONS:   Unavailable           CT HEAD WO CONTRAST   Final Result   Addendum (preliminary) 1 of 1   ADDENDUM:   Findings were discussed with MOSES Mcduffie at 8:50 pm on 8/20/2022. Final   Likely acute infarct within the right mid corona radiata. XR CHEST PORTABLE   Final Result   Redemonstration of right apical lung mass. The above laboratory data have been reviewed. The above imaging data have been reviewed. The above medical testing have been reviewed. Body mass index is 21.81 kg/m². Barriers to Discharge: weakness, endurance, balance    POST ADMISSION PHYSICIAN EVALUATION  The patient has agreed to being admitted to our comprehensive inpatient  rehabilitation facility consisting of at least 180 minutes of therapy a day,  5 out of 7 days a week.      The patient/family has a good understanding of our discharge process. The  patient has potential to make improvement and is in need of at least two of  the following multidisciplinary therapies including but not limited to  physical, occupational, respiratory, and speech, nutritional services, wound care, and prosthetics and orthotics. Given the patients complex condition  and risk of further medical complications, rehabilitation services cannot be  safely provided at a lower level of care such as a skilled nursing facility. I have compared the patients medical and functional status at the time of the  preadmission screening and the same on this date, and there are no significant changes. By signing this document, I acknowledge that I have personally performed a  full physical examination on this patient within 24 hours of admission to  this inpatient rehabilitation facility and have determined the patient to be  able to tolerate the above course of treatment at an intensive level for a  reasonable period of time. I will be completing a detailed individualized  Plan of Care for this patient by day four of the patients stay based upon the  Preadmission Screen, this Post-Admission Evaluation, and the therapy  evaluations. Rehabilitation Diagnosis:  Stroke, 1.1, Left Body (R Brain)    Assessment and Plan:  Acute right corona radiata infarct  - plavix, statin, anticoagulation  - secondary prevention with blood pressure control    Left hemiparesis  - PT/OT  - Positioning    Afib  - rate controlled  - anticoag with eliquis    R apical lung mass  - patient deferring workup      Dysphagia  - slp consulted    Bowels: Schedule stool softener. Follow bowel movements. Enema or suppository if needed. Bladder: Check PVR x 3. [address] if PVR > 200ml or if any volume is > 500 ml. Sleep: Trazodone provided prn. Zachary Mitchell MD 8/25/2022, 9:07 AM

## 2022-08-25 NOTE — PLAN OF CARE
Problem: Discharge Planning  Goal: Discharge to home or other facility with appropriate resources  Outcome: Progressing  Flowsheets (Taken 8/25/2022 1936)  Discharge to home or other facility with appropriate resources:   Identify barriers to discharge with patient and caregiver   Identify discharge learning needs (meds, wound care, etc)   Refer to discharge planning if patient needs post-hospital services based on physician order or complex needs related to functional status, cognitive ability or social support system     Problem: Safety - Adult  Goal: Free from fall injury  Outcome: Progressing  Flowsheets (Taken 8/25/2022 1936)  Free From Fall Injury: Instruct family/caregiver on patient safety     Problem: Skin/Tissue Integrity  Goal: Absence of new skin breakdown  Description: 1. Monitor for areas of redness and/or skin breakdown  2. Assess vascular access sites hourly  3. Every 4-6 hours minimum:  Change oxygen saturation probe site  4. Every 4-6 hours:  If on nasal continuous positive airway pressure, respiratory therapy assess nares and determine need for appliance change or resting period.   Outcome: Progressing     Problem: ABCDS Injury Assessment  Goal: Absence of physical injury  Outcome: Progressing  Flowsheets (Taken 8/25/2022 1936)  Absence of Physical Injury: Implement safety measures based on patient assessment     Problem: Chronic Conditions and Co-morbidities  Goal: Patient's chronic conditions and co-morbidity symptoms are monitored and maintained or improved  Outcome: Progressing  Flowsheets (Taken 8/25/2022 1936)  Care Plan - Patient's Chronic Conditions and Co-Morbidity Symptoms are Monitored and Maintained or Improved:   Monitor and assess patient's chronic conditions and comorbid symptoms for stability, deterioration, or improvement   Collaborate with multidisciplinary team to address chronic and comorbid conditions and prevent exacerbation or deterioration     Problem: Pain  Goal: Verbalizes/displays adequate comfort level or baseline comfort level  Outcome: Progressing  Flowsheets (Taken 8/25/2022 1936)  Verbalizes/displays adequate comfort level or baseline comfort level:   Encourage patient to monitor pain and request assistance   Assess pain using appropriate pain scale   Administer analgesics based on type and severity of pain and evaluate response   Implement non-pharmacological measures as appropriate and evaluate response     Problem: Nutrition Deficit:  Goal: Optimize nutritional status  8/25/2022 1936 by Edgardo Villalobos RN  Outcome: Progressing  Flowsheets (Taken 8/25/2022 1936)  Nutrient intake appropriate for improving, restoring, or maintaining nutritional needs:   Assess nutritional status and recommend course of action   Monitor oral intake, labs, and treatment plans   Recommend appropriate diets, oral nutritional supplements, and vitamin/mineral supplements  8/25/2022 1323 by Maryellen Wheeler RD, LD  Outcome: Progressing

## 2022-08-25 NOTE — PROGRESS NOTES
attention; working memory and [employer]. Additional assessment of PS/reasoning for higher level cognitive-linguistic skills for adv DL requiring higher level executive function. Pt with reduced eye contact and flat affect; ongoing assessment for visual attention. Pt 's visual scanning and attention for reading tasks was functional.  2. 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 Despite this oral motor connected speech is audible and intelligible. 3. Communication Diagnosis: Receptive language processing and verbal expressive language skills appeared functional for concrete and mild complex.         Recommendations:  Recommendations  Requires SLP Intervention: Yes  Recommendations:  (Repeat JOHN)  Patient Education Response: Verbalizes understanding;Needs reinforcement  Duration of Treatment: 5 times a week while on rehab unless otherwise notified          Plan:   Speech Therapy Prognosis  Prognosis: Good (guarded)  Individuals consulted  Consulted and agree with results and recommendations: Patient  Safety Devices  Safety Devices in place: Yes    Goals:  Pt's goal is to get home and care for self  Goal 1: Pt will participate in ongoing assessment of VPS/PS for adv DL tasks requiring higher level cognitive-linguistic skills for executive function with additional goals PRN  Goal 2: Pt will demonstrate improved recall of daily events; learned safety strategies/techniques and new information with use of compensatory strategies and <mild assist  Goal 3: Pt will demonstrate functional VPS/PS for daily living situations and participation in adv DL situations with mild assist   Patient/family involved in developing goals and treatment plan: yes    Subjective:   Previous level of function and limitations: Per chart review and pt self report  Social/Functional History  Lives With: Family Sandy Llamas (Meena Jha, 35 yr, \"works 3 jobs, in/out a lot\"). )  Type of Home: House  Home Equipment: [place]isha [place], standard (Family obtained Stand Manisha [place] following return home from ER (8/16/2022), [facility] [place])  Receives Help From: Family (2 daughters, myrtle lives close by, Mele Alonzo lives in [place])  ADL Assistance: Independent  Homemaking Assistance: Independent (Grandson able to A but hadn't been prior)  Homemaking Responsibilities: Yes  Meal Prep Responsibility: Primary  Laundry Responsibility:  (share)  Cleaning Responsibility:  (share)  Shopping Responsibility:  (sharing)  Health Care Management:  (james takes me to the pharmacy)  Ambulation Assistance: Independent (Without assist device prior TIA (8/16/2022); using Stand Manisha [place] following.)  Transfer Assistance: Independent  Active :  (pt is reporting james takes me to most places; I may drive to [place])  Mode of Transportation: Car  Occupation: Retired  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 Leisure & Hobbies: Taking care of my house, scrapbooking (however has not been happy enough to do so lately per her report)  Additional Comments: Pt to ER 8/16/2022 with TIA; left AMA. .  Pt reports progress L sided weak since return home. Objective: Assessment included Oral Motor Speech/Voice; Cognitive -Linguistic Exam   Pain: denied    Vision  Vision: Within Functional Limits (pt with limited eye contact. SLP on left side of pt . Pt with left side weakness post CVA. ? visual)  Hearing  Hearing: Within functional limits    Oral Motor   Labial: Left droop; Decreased rate  Lingual: Decreased strength;Decreased rate (deviates on protrusion/elevation)  Velum: No Impairment  Mandible: Restricted  Gag:  (diminished to absent)    Motor Speech  Intelligibility:  (Audible and intelligible connected speech despite left om weakness. There are minimal phonemic distortions which do not impact. No syllable reduction errors.  Verbal diadochokinetic rating fair to good (speech rate can be slow; otherwise 0 to very minimal phonemic distortions)    Auditory Comprehension  Comprehension: Within Functional Limits  Basic Questions: WFL  One Step Commands: WFL  Two Step Commands: WFL  Multi step Commands: WFL (3 step simple commands)  L/R Discrimination: WFL    Reading Comprehension  Reading Status: Within functional limits  Words Impairment Severity: WFL  Phrases Impairment Severity: WFL  Sentence Impairment Severity: WFL  Paragraph Impairment Severity: WFL (4-7 line paragraph concrete and mild complex content)    Expression  Primary Mode of Expression: Verbal  Verbal Expression  Verbal Expression: Within functional limits  Automatic Speech: WFL  Confrontation: WFL  Convergent: WFL  Divergent: WFL  Conversation: WFL    Written Expression  Dominant Hand: Right  Written Expression:  (TBA)    Pragmatics/Social Functioning  Pragmatics: Within functional limits (social interaction was [facility] on testing. Pt wtih poor eye contact while SLP on the left; ?visual attention.  Flat affect)    Cognition:   Orientation  Overall Orientation Status: Within Functional Limits (WFL on testing)  Attention  Attention: Exceptions to [facility]  Alternating Attention: Mild  Divided Attention: Moderate  Selective Attention: WFL  Sustained Attention: WFL  Memory  Memory: Exceptions to [facility]  Short-term Memory: Mild  Working Memory:  (0 to mild deficits)  Problem Solving  Problem Solving: Exceptions to [facility]  Simple Functional Tasks: [facility]  Verbal Reasoning Skills: WFL (on testing)  Executive Function Skills:  (TBA)  Numeric Reasoning  Numeric Reasoning: Within Functional Limits  Calculations: WFL (WFL on testing)  Money Management:  (TBA)  Time: WFL (WFL on testing)  Abstract Reasoning  Abstract Reasoning: Within Functional Limits (WFL on testing; but ongoing assessment for functional PS/reasoning)  Safety/Judgment  Safety/Judgment:  (TBA)      Prognosis:  Speech Therapy Prognosis  Prognosis: Good (guarded)  Individuals consulted  Consulted and agree with results and recommendations: Patient    Education:  Patient Education Response: Verbalizes understanding;Needs reinforcement  Safety Devices in place: Yes       Therapy Time:   Individual   Time In 1430   Time Out 1508   Minutes 235 [facility] PALLAVI Donohue MS,CCC,SLP [id number]  Speech and Language Pathologist  on 8/25/2022 at 4:47 PM

## 2022-08-26 PROCEDURE — 97129 THER IVNTJ 1ST 15 MIN: CPT

## 2022-08-26 PROCEDURE — 6370000000 HC RX 637 (ALT 250 FOR IP): Performed by: PHYSICAL MEDICINE & REHABILITATION

## 2022-08-26 PROCEDURE — 97530 THERAPEUTIC ACTIVITIES: CPT

## 2022-08-26 PROCEDURE — 97112 NEUROMUSCULAR REEDUCATION: CPT

## 2022-08-26 PROCEDURE — 94760 N-INVAS EAR/PLS OXIMETRY 1: CPT

## 2022-08-26 PROCEDURE — 92526 ORAL FUNCTION THERAPY: CPT

## 2022-08-26 PROCEDURE — 1280000000 HC REHAB R&B

## 2022-08-26 PROCEDURE — 92610 EVALUATE SWALLOWING FUNCTION: CPT

## 2022-08-26 RX ORDER — METOPROLOL SUCCINATE 25 MG/1
37.5 TABLET, EXTENDED RELEASE ORAL DAILY
Status: DISCONTINUED | OUTPATIENT
Start: 2022-08-26 | End: 2022-08-28 | Stop reason: HOSPADM

## 2022-08-26 RX ORDER — TRAMADOL HYDROCHLORIDE 50 MG/1
50 TABLET ORAL EVERY 6 HOURS PRN
Status: DISCONTINUED | OUTPATIENT
Start: 2022-08-26 | End: 2022-08-28 | Stop reason: HOSPADM

## 2022-08-26 RX ORDER — TRAZODONE HYDROCHLORIDE 50 MG/1
50 TABLET ORAL NIGHTLY PRN
Status: DISCONTINUED | OUTPATIENT
Start: 2022-08-26 | End: 2022-08-28 | Stop reason: HOSPADM

## 2022-08-26 RX ORDER — HYDRALAZINE HYDROCHLORIDE 50 MG/1
50 TABLET, FILM COATED ORAL EVERY 8 HOURS PRN
Status: DISCONTINUED | OUTPATIENT
Start: 2022-08-26 | End: 2022-08-28 | Stop reason: HOSPADM

## 2022-08-26 RX ORDER — SERTRALINE HYDROCHLORIDE 25 MG/1
25 TABLET, FILM COATED ORAL DAILY
Status: DISCONTINUED | OUTPATIENT
Start: 2022-08-27 | End: 2022-08-28 | Stop reason: HOSPADM

## 2022-08-26 RX ADMIN — ATORVASTATIN CALCIUM 80 MG: 80 TABLET, FILM COATED ORAL at 21:10

## 2022-08-26 RX ADMIN — FLUOXETINE 20 MG: 20 CAPSULE ORAL at 08:03

## 2022-08-26 RX ADMIN — METOPROLOL SUCCINATE 37.5 MG: 25 TABLET, EXTENDED RELEASE ORAL at 09:56

## 2022-08-26 RX ADMIN — APIXABAN 5 MG: 5 TABLET, FILM COATED ORAL at 08:03

## 2022-08-26 RX ADMIN — APIXABAN 5 MG: 5 TABLET, FILM COATED ORAL at 21:10

## 2022-08-26 RX ADMIN — CLOPIDOGREL BISULFATE 75 MG: 75 TABLET ORAL at 08:03

## 2022-08-26 ASSESSMENT — PAIN SCALES - GENERAL: PAINLEVEL_OUTOF10: 0

## 2022-08-26 NOTE — PROGRESS NOTES
Patient admitted to rehab with CVA. A/Ox4. Transfers with stedy x2 leans to the side. Mobility restrictions: none. On soft and bite sized diet, tolerating well has poor appetite. Medications taken whole in applesauce. On Eliquis for DVT prophylaxis. Skin: intact. Oxygen: RAWillian Copeland Bloodgood Has been continent of bowel and continent of bladder. LBM 8/26. Chair/bed alarms in use and call light in reach. Will monitor for safety.

## 2022-08-26 NOTE — PROGRESS NOTES
Occupational Therapy  [facility]  Rehabilitation Occupational Therapy Daily Treatment Note  AM and PM sessions     Date: 22  Patient Name: Shakila Maloney       Room: G2T-5607/1467-22  MRN: [id number]  Account: [de-identified]   : 1950  (76 y.o.) Gender: female           Past Medical History:  has no past medical history on file. Past Surgical History:   has no past surgical history on file. Restrictions  Restrictions/Precautions: Fall Risk;Up as Tolerated; Modified Diet  Other position/activity restrictions: diet soft and bite sized; meds crushed in pureed; teley; high fall risk; L NEGLECT & hemiparesis      PM co-tx-  Subjective- Met in Pt's room, co-tx with physical therapy due to poor endurance and L sided weakness. At first she was refusing therapy however with encouragement from her daughter and grandson she agreed to complete therapy session in the room. No pain reported. Objective- Pt transferred supine<>sit with min A x2, attempted to cross LLE with RLE but needed A, also needed A at trunk to complete sit. Static sitting balance EOB CGA. Seated practiced reaching task and modified crunches, unable to complete forward flexion due to fear of falling, min A during seated reaching tasks, bobath sling was applied. No active movement of LUE during reaching tasks in sitting, required tactile cues and facilitation at L arm when not in sling. Sit<>stand at umu walker. First attempt mod A x1 and min A x1, second attempt min A x2. Declined use of mirror for visual feedback, Pt does lean to L but feels as though she is standing at center. She stood and batted balloon 3min with min A x2, able to reach past midline with RUE to bat at balloon, cued to look to L visual field. Second standing Pt stood 2min with min A x2 to complete weight shifting onto RLE and cervical rotation to the L to encourage L gaze.  Pt scooted up on side of bed with mod cues and min A x2. Sit<>supine Bearing  Weight Bearing Technique: Yes  LUE Weight Bearing: Extended arm seated  Response To Weight Bearing Technique: Practiced weight bearing on LUE on textured ball, PROM L arm extension and flexion onto textured ball on table, unable to feel the texture on the ball but can feel there is something there, no active ROM, trace reaction during shoulder protraction and forearm extension  OT Exercises  Resistive Exercises: Used theraputty on RUE while LUE supported on table, RUE able to move putty, squeeze in hand, roll out, and spread fingers  Motor Control/Coordination: Used LUE as a stablizer on base of clothespin board, she used RUE to place clothespins onto pegs, 100% accuracy, able to reach across midline to grasp clothespins     Assessment  Assessment  Assessment: Pt continues to have significant L sided hemiparesis and L neglect that impacts her ability to participate in treatment sessions. She participated in vision screenings, on vison disc able to see 90 degrees L eye and 80 degrees R eye, during visual tracking she was able to track in all quadrants and track between 2 objects with cues to keep head at midline and look to L visual field. She participated in neuro re-education activities weight bearing on LUE on textured ball, and participating in passive movements while S/OT applied vibration therapy to LUE, she is unable to have any active movement in LUE but has trace scapular retraction and forearm extension during assisted movements. LUE was able to act as a stabilizer during seated fine motor task having RUE doing active movement. Activity Tolerance: Patient limited by endurance; Other (comment) (limited ROM)  Discharge Recommendations: Continue to assess pending progress; Patient would benefit from continued therapy after discharge  Safety Devices  Safety Devices in place: Yes  Type of devices: Gait belt;Patient at risk for falls       Plan  Plan  Times per Week: 5-7  Times per Day: Daily  Plan Weeks: x2-4 weeks  Current Treatment Recommendations: Strengthening;ROM;Balance training;Functional mobility training; Endurance training;Patient/Caregiver education & training;Neuromuscular re-education; Safety education & training;Self-Care / ADL;Equipment evaluation, education, & procurement;Cognitive/Perceptual training;Coordination training    Goals  Patient Goals   Patient goals : \"increase indepdence to get home and walk around\"  Short Term Goals  Time Frame for Short term goals: Within 1-2  weeks Pt will  Short Term Goal 1: Complete toileting w/ mod A  Short Term Goal 2: Complete bathing seated on BSC/SC with min A  Short Term Goal 3: Complete dressing using AE and umu techniques with mod A  Short Term Goal 4: Complete grooming seated at sink with SBA  Short Term Goal 5: Complete functional transfers via stand pivot w/ mod A  Long Term Goals  Time Frame for Long term goals :  Within 2-4 weeks Pt will  Long Term Goal 1: Complete toileting w/ CGA  Long Term Goal 2: Complete bathing seated on BSC/SC using AE with SBA  Long Term Goal 3: Complete dressing using AE and umu techniques with CGA  Long Term Goal 4: Complete grooming seated at sink mod I  Long Term Goal 5: Complete functional transfers via stand pivot w/ CGA  Additional Goals?: Yes  Long Term Goal 6: Complete caregiver education and DME needs prior to discharge           Therapy Time   Individual Concurrent Group Co-treatment   Time In 1030      1515   Time Out 1115      1600   Minutes 45      45   Timed Code Treatment Minutes: 1600 [address] S/OT FLORENTIN Velásquez/L #[id number] provided direct supervision to student and concur with PN

## 2022-08-26 NOTE — PROGRESS NOTES
coming fully upright. Stand to Sit  Assistance Level: Minimal assistance  Skilled Clinical Factors: with knee blocked and assistance at trunk to slow descent  Bed To/From Chair  Technique:  (partial stand pivot)  Assistance Level: Moderate assistance  Skilled Clinical Factors: difficulty pivoting on feet and fearful of falling to the right. Patient pushes with right UE and is resistant to pivoting that way. Performed multiple pivot transfers left and right. Patient needs assistance positioning left foot before transfer and requires blocking of left knee. No resistance with pivoting to the left. Multiple transfers done to the left and the right without a device. ASSESSMENT/PROGRESS TOWARDS GOALS       Assessment  Assessment: Ms. Lizett Calderon was emotionally improved this AM and not tearful. She is motivated to continue her progress with therapy. 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 Patient required cueing to attend to mirror and recognize position with left lean in standing. She is limited to under a minute for standing tolerance. Patient continues to be below baseline and will benefit from continued skilled therapy for strengthening, neuromuscular re-education, functional mobility training, and gait training to improve independence. Activity Tolerance: Patient limited by endurance; Patient tolerated treatment well (limited ROM)  Discharge Recommendations: Continue to assess pending progress; Patient would benefit from continued therapy after discharge  PT Equipment Recommendations  Equipment Needed: Yes  Other: Continue to assess DME needs with functional mobility progression. PM Session  PT received notice that the patient was refusing therapy. PT and OT went bedside for co-treatment. Patient initially refusing stating, \"don't I have rights? \" PT and OT explained that patient does, but she is here for therapy and the therapy is what will help her reach her goal of getting home. Patient also reporting she thought it was late. She reports she prefers earlier. Patient agreeable to participate with encouragement from therapy and from family (daughter and grandson present). She preferred to stay in room. Supine>sit with min assist of 2. She attempted to assist left LE over with right LE, but was unable to do this on her own. Needs assist to lift trunk from the bed. Performed sitting balance activities while at EOB and patient was CGA for static balance, but she required assist to adjust hips in sitting. Performed reaching task, side bending to elbow, modified crunches, and attempted forward flexion (too fearful to flex forward). Patient was able to complete tasks with min-CGA. She needs facilitation at left arm which was assisted into weight bearing position. Unable to achieve any muscular activation in left UE except in scapular region. Sit>stand to umu walker. First attempt was mod assist x1 and min assist x1, and second attempt was min assist of 2. Patient is fearful of shifting weight onto right LE and feels she is having LOB to the right. She declined use of mirror for visual feedback. Patient stood and batted balloon with student PT with min assist of 2 in standing. Patient stood for 3 minutes for activity. With second stand, patient performed weight shifting left and right with assist. Patient is hesitant to weight shift onto right LE. Also performed cervical rotation to the left with patient following PT to encourage left gaze. Patient stood for 2 minutes. Scooted up in bed with moderate verbal cueing and min assist of two. Sit>supine max of one for legs and mod of one for trunk. She bridged to scoot hips over, but required SPT to block left knee and assist of OT. Patient was assisted into a comfortable position with pillows.  She appeared happy that she was able to complete therapy this afternoon. It is anticipated patient will not co-treatment for long    Safety Device - Type of devices:  [x]  All fall risk precautions in place [x] Bed alarm in place  [x] Call light within reach [] Chair alarm in place [] Positioning belt [x] Gait belt [] Patient at risk for falls [x] Left in bed [] Left in chair [] Telesitter in use [] Sitter present [] Nurse notified []  None        Goals  Patient Goals   Patient goals : \"to get out of her with some independence\"  Short Term Goals  Time Frame for Short term goals: 1-2 weeks  Short term goal 1: Bed mobility min assist  Short term goal 2: Transfer sit <-> stand min assist  Short term goal 3: Pt will be able to ambulate with LRAD 50' with moderate assistance  Short term goal 4: Pt will be able to ascend/descend 4 steps with unilateral railing mod assistance  Short term goal 5: Pt will be able to ascend/descend curb step LRAD with moderate assistance  Long Term Goals  Time Frame for Long term goals : 3-4 weeks  Long term goal 1: Bed mobility CGA for all aspects  Long term goal 2: Transfer sit <_> stand CGA  Long term goal 3: Pt will be able to ambulated 36' with LRAD CGA-min assist  Long term goal 4: wheelchair mobility 150' with LRAD supervision  Long term goal 5: Ascend/descend 12 steps with unilateral railing min assist  Additional Goals?: Yes  Long term goal 6: Ascend/descend curb step with LRAD min assist    PLAN OF CARE/SAFETY  Plan  Plan:  minutes of therapy at least 5 out of 7 days a week  Plan weeks: 3-4 weeks  Current Treatment Recommendations: Strengthening;Balance training;Functional mobility training;Transfer training;Neuromuscular re-education; Safety education & training;Patient/Caregiver education & training;ADL/Self-care training;Gait training; Wheelchair mobility training; Endurance training;Home exercise program;Equipment evaluation, education, & procurement;Stair training;Positioning  Safety Devices  Type of Devices:  All fall risk precautions Patient contacted in place;Gait belt;Left in chair (in wheelchair to return to room with transportation)  Restraints  Restraints Initially in Place: No    EDUCATION  Education  Education Given To: Patient; Family  Education Provided: Role of Therapy; Mobility Training; Safety; Fall Prevention Strategies; Plan of Care  Education Provided Comments: use of mirror for visual input  Education Method: Verbal  Barriers to Learning: Other (Comment) (emotional)  Education Outcome: Continued education needed        Therapy Time   Individual Concurrent Group Co-treatment   Time In 0900     1515   Time Out 0945     1600   Minutes 3[address] [facility], [place] [address], 08/26/22 at 4:23 PM

## 2022-08-26 NOTE — PROGRESS NOTES
evaluation to assess the efficiency of her swallow function, identify signs and symptoms of aspiration and make recommendations regarding safe dietary consistencies, effective compensatory strategies, and safe eating environment. Assessment Impression  1. Dysphagia Diagnosis: Mild to moderate oral stage dysphagia;Mild pharyngeal stage dysphagia. Oropharyngeal Dysphagia is characterized by left oral motor muscular weakness/rom which can impact rate/ease of mastication and bolus formation and rate of A-P oral transit;Delayed initiation of swallow. no anterior loss; IND management of oral pocketing  no post swallow s/s of aspiration; no post swallow voice quality changes  Plan to continue SLP therapy with potential pt candidacy into NMES vital stimulation; upgrade to easy to chew food consistencies as desired. Await MD orders if in agreement. 2. Pt with observed left visual inattention which did impact ease of use of table top /food tray. Recommend set up for meals and visual orientation to items; recommend f/u during course of meal to ensure left visual attention to times. Pt responded well to verbal cues during session. Dysphagia Outcome Severity Scale: Level 4: Mild moderate dysphagia- Intermittent supervision/cueing. One - two diet consistencies restricted     Treatment Plan  Requires SLP Intervention: Yes  Duration of Treatment: 3-5 times a week therapy  D/C Recommendations: Ongoing speech therapy is recommended during this hospitalization       Recommended Diet and Intervention Await MD orders  Upgrade to easy to chew food consistencies as desired  Continue thin liquids  Recommended Form of Meds: Meds in puree     Therapeutic Interventions: Bolus control exercises;Diet tolerance monitoring; Therapeutic PO trials with SLP;Laryngeal exercises;Oral care (potential trial of NMES vital stimulation)    Compensatory Swallowing Strategies  Compensatory Swallowing Strategies : Upright as possible for all oral intake;Small bites/sips;Swallow 2 times per bite/sip; Remain upright for 30-45 minutes after meals (check for any oral pocketing; check for tolerance)    Treatment/Goals  Pt goal is to eat more mores and less wet foods  Dysphagia Goals: The patient will tolerate recommended diet without observed clinical signs of aspiration; The patient/caregiver will demonstrate understanding of compensatory strategies for improved swallowing safety. ;The patient will improve oral preparation phase via bolus control/manipulation exercises to 5/5 each trial.    General  Chart Reviewed: Yes  Behavior/Cognition: Alert; Cooperative;Pleasant mood; Requires cueing (left visual attention)  Respiratory Status: Room air  Communication Observation: Functional (for expressing likes/dislikes)  Follows Directions: Simple  Patient Positioning: Upright in chair  Prior Dysphagia History: pt was active with SLP while on acute medical floor  Consistencies administered: thin liquids; slightly thick liquids ; puree food consistencies; mech soft moist food consistencies; soft/bite size food consistencies and easy to chew food consistencies. Pain: denied    Vision/Hearing  Vision  Vision: Impaired (left visual inattention)  Hearing  Hearing: Within functional limits    Oral Motor Deficits  Labial: Left droop; Decreased rate  Lingual: Decreased strength;Decreased rate (deviates on protrusion/elevation)  Velum: No Impairment  Mandible: Restricted  Gag:  (diminished to absent)  Vocal Quality:  (soft but audible)  Volitional Cough: Weak  Volitional Swallow: Delayed    Oral Phase Dysfunction  Oral Phase  Oral Phase - Comment: Prolonged mastication and can be variable across consistencies; variable rate of A-P oral transit; no anterior loss; IND management of oral pocketing     Indicators of Pharyngeal Phase Dysfunction   Pharyngeal Phase   Pharyngeal Phase: Delayed initiation of swallow; no post swallow s/s of aspiration; no post swallow voice quality changes    Prognosis  Individuals consulted  Consulted and agree with results and recommendations: Patient; Family member;RN  RN Name: Farhan Trupti    Education  Patient Education Response: Verbalizes understanding;Needs reinforcement  Safety Devices in place: Yes  Type of devices: All fall risk precautions in place;Call light within reach; Chair alarm in place (family at bedside)       Therapy Time  SLP Individual Minutes  Time In: 1127  Time Out: 1210  Minutes: 43   Coded time: 13 minutes       Signed  Shelby DonohueMS,CCC,SLP [id number]  Speech and Language Pathologist  8/26/2022 12:15 PM

## 2022-08-26 NOTE — PLAN OF CARE
Problem: Safety - Adult  Goal: Free from fall injury  8/26/2022 0515 by Sathya Sampson RN  Outcome: Progressing  Note: Fall risk band on patient. Orange light on outside of room. Non skid footwear in place. Alarms used appropriately. Patient instructed to call and wait for staff before getting up. Rounding done to anticipate needs. Appropriate safety devices used for transfers.

## 2022-08-26 NOTE — CARE COORDINATION
Chart Reviewed   Met with patient , dgtr and grandson in the room to introduce  role, initiate discussion regarding dc planning and to inform of weekly Team conferences on Tuesdays. SOCIAL WORK ASSESSMENT      GOAL:     Goal is to return home at discharge. HOME SITUATION:   Pt lives in a house with 5 steps with bilateral rails to enter and first floor set up with full bath on first floorl     Her 35year old grandson lives with her but works multiple jobs and is not home very often. She reports she was totally independent prior to admit and used no DME at home. She does NOT have a pcp. Dgtr is an RN and is working on getting one set up for her. PRIOR LEVEL OF FUNCTIONING:       PERSONAL CARE:    totally independent                                                                       DRIVES:yes                                                                     FINANCES:independent                                                                   MEALS:  indpendent  totally independent                             GROCERY SHOPS:   uses [employer] and has them delivered to her home. DME CURRENTLY AT HOME:  wheelchair, Std walker,  bsc      CURRENT HOME CARE/SERVICES:  Informed them of possible post acute services such as home care or out patient services to continue her progress. Provided them with CMS star rated list of agencies for their review and provided education regarding CMS Star rating. PREFERRED HOME CARE:TBD      TEAM CONFERENCE DAY:   Tuesdays. Informed them of weekly Team Conferences where Team will review progress, goals, DME recommendations and DC date. This worker will update them weekly and plan for DC needs. LSW informed patient of preferred  time on date of discharge which is between 10 - 12 noon. LSW informed patient of recommendation for PCP visit within 7 days post discharge.     [place], [place] Case Management   [phone]    8/26/2022  4:37 PM

## 2022-08-26 NOTE — PROGRESS NOTES
Griselda Huffman  8/26/2022  [id number]    Chief Complaint: Right hemisphere, cerebral infarction [facility])    Subjective:   No overnight events. No current complaints outside of frustration and depression about her current situation. BP has been running low. Daughter currently present. ROS: No CP, SOB, dyspnea    Objective:  Patient Vitals for the past 24 hrs:   BP Temp Temp src Pulse Resp SpO2 Weight   08/26/22 1459 128/65 97.7 °F (36.5 °C) Oral 100 16 100 % --   08/26/22 1343 119/74 97.7 °F (36.5 °C) Oral (!) 104 14 100 % --   08/26/22 1312 -- -- -- -- -- 96 % --   08/26/22 0956 103/61 -- -- 82 -- -- --   08/26/22 0845 103/64 -- -- (!) 114 -- -- --   08/26/22 0800 104/63 -- -- 97 16 95 % --   08/26/22 0441 126/84 98.1 °F (36.7 °C) Oral 93 16 96 % 135 lb 2.3 oz (61.3 kg)   08/26/22 0002 119/70 98.5 °F (36.9 °C) Oral 95 16 95 % --   08/25/22 2001 121/73 97.7 °F (36.5 °C) Oral 97 16 96 % --     Gen: No distress, pleasant. Resting in bedside chair  HEENT: Normocephalic, atraumatic. CV: Regular rate and rhythm. No MRG   Resp: No respiratory distress. CTAB   Abd: Soft, nontender nondistended  Ext: No edema. Neuro: Alert, oriented, appropriately interactive. Left hemiparesis, left neglect, right facial droop    Laboratory data: Available via EMR.      Therapy progress:    PT    Supine to Sit: Substantial/maximal assistance  Sit to Supine:     Sit to Stand: Partial/moderate assistance  Chair/Bed to Chair Transfer: Substantial/maximal assistance  Car Transfer: Substantial/maximal assistance  Ambulation 10 ft: Substantial/maximal assistance  Ambulation 50 ft:    Ambulation 150 ft:    Stairs - 1 Step: Substantial/maximal assistance  Stairs - 4 Step:    Stairs - 12 Step:      OT    Eating: Partial/moderate assistance  Oral Hygiene: Partial/moderate assistance  Bathing: Substantial/maximal assistance  Upper Body Dressing: Substantial/maximal assistance  Lower Body Dressing: Dependent  Toilet Transfer: Dependent  Toilet Hygiene: Dependent    Speech Therapy         Body mass index is 21.81 kg/m². Assessment:  Patient Active Problem List   Diagnosis    Acute cerebrovascular accident (CVA) due to occlusion of right middle cerebral artery (HCC)    Paroxysmal atrial fibrillation (HCC)    Mass of upper lobe of right lung    Right hemisphere, cerebral infarction ([address]       Plan:   Acute right corona radiata infarct  - plavix, statin, anticoagulation  - secondary prevention with blood pressure control  - paxil to zoloft for SE profile     Left hemiparesis  - PT/OT  - Positioning     Afib  - rate controlled  - toprol 50 -> 37.5  - anticoag with eliquis     R apical lung mass  - patient deferred workup       Dysphagia  - slp  - dysphagia diet    Bowel: Per protocol  Bladder: Per protocol  Sleep: Trazodone PRN  Pain: tylenol, tramadol PRN  DVT PPx: eliquis  STEWART: LALA Augustin MD 8/26/2022, 3:45 PM    * This document was created using dictation software. While all precautions were taken to ensure accuracy, errors may have occurred. Please disregard any typographical errors.

## 2022-08-27 PROCEDURE — 97535 SELF CARE MNGMENT TRAINING: CPT

## 2022-08-27 PROCEDURE — 1280000000 HC REHAB R&B

## 2022-08-27 PROCEDURE — 97112 NEUROMUSCULAR REEDUCATION: CPT

## 2022-08-27 PROCEDURE — 97130 THER IVNTJ EA ADDL 15 MIN: CPT

## 2022-08-27 PROCEDURE — 97542 WHEELCHAIR MNGMENT TRAINING: CPT | Performed by: PHYSICAL THERAPIST

## 2022-08-27 PROCEDURE — 6370000000 HC RX 637 (ALT 250 FOR IP): Performed by: PHYSICAL MEDICINE & REHABILITATION

## 2022-08-27 PROCEDURE — 97530 THERAPEUTIC ACTIVITIES: CPT | Performed by: PHYSICAL THERAPIST

## 2022-08-27 PROCEDURE — 92526 ORAL FUNCTION THERAPY: CPT

## 2022-08-27 PROCEDURE — 97116 GAIT TRAINING THERAPY: CPT | Performed by: PHYSICAL THERAPIST

## 2022-08-27 PROCEDURE — 97129 THER IVNTJ 1ST 15 MIN: CPT

## 2022-08-27 RX ADMIN — APIXABAN 5 MG: 5 TABLET, FILM COATED ORAL at 20:58

## 2022-08-27 RX ADMIN — SENNOSIDES, DOCUSATE SODIUM 1 TABLET: 8.6; 5 TABLET ORAL at 20:58

## 2022-08-27 RX ADMIN — METOPROLOL SUCCINATE 37.5 MG: 25 TABLET, EXTENDED RELEASE ORAL at 09:12

## 2022-08-27 RX ADMIN — SERTRALINE 25 MG: 25 TABLET, FILM COATED ORAL at 09:12

## 2022-08-27 RX ADMIN — APIXABAN 5 MG: 5 TABLET, FILM COATED ORAL at 09:12

## 2022-08-27 RX ADMIN — ATORVASTATIN CALCIUM 80 MG: 80 TABLET, FILM COATED ORAL at 20:58

## 2022-08-27 RX ADMIN — CLOPIDOGREL BISULFATE 75 MG: 75 TABLET ORAL at 09:13

## 2022-08-27 ASSESSMENT — PAIN SCALES - GENERAL
PAINLEVEL_OUTOF10: 0

## 2022-08-27 NOTE — PROGRESS NOTES
Resting well in bed. Pt admitted with a CVA, left side is flaccid. Transferring with a stedy of 1-2, heavy left sided lean. Lungs clear but diminished. HR irregular. On telemetry showing afib. Abdomen non tender with active bowel sounds. Pt with poor PO intake. Snack offered and declined. Denies pain. Encouraged to call for all needs, call light in reach at all times.  Mitzi Beatty RN

## 2022-08-27 NOTE — PROGRESS NOTES
Clinic Visit Note    Subjective:  Radha Purvis is a 50 year old female who presents for the following issues:    1. Establish care: the patient presents to clinic today to establish care with a new PCP. She also wanted to go over her abdominal bloating. She had a nissen fundoplication done last November due to her stomach compressing her left lung. She states since the surgery last November when she had the procedure done she has been having abdominal bloating. She just wanted to see if there were any other recommendations for this. She sometimes has nausea and she already has a medication for her nausea. She does have an appointment coming up with GI for an endoscopy.     She has no other acute concerns today.     ALLERGIES:   Allergen Reactions   • Morphine ANAPHYLAXIS   • Compazine GI UPSET and Other (See Comments)     Restlessness and loose bowel with suppository   • Amitriptyline HEADACHES     Makes migraines worse   • Aspirin Other (See Comments)     Can not take with Depakote   • Cymbalta [Duloxetine Hcl] Nausea & Vomiting   • Lamictal RASH   • Nickel SWELLING   • Penicillins Other (See Comments)     UNKNOWN, HAD AS A CHILD   • Venlafaxine GI UPSET and DIARRHEA     Imbalance    • Mirtazapine      Shaking and twitching on this medicine at 15 mg       Current Outpatient Prescriptions   Medication Sig Dispense Refill   • DIAZepam (VALIUM) 5 MG tablet Take 1 tablet twice a day and 2 tablets at night. 360 tablet 1   • pregabalin (LYRICA) 100 MG capsule Take 2 capsules by mouth 2 times daily. 360 capsule 1   • DELZICOL 400 MG DR capsule TAKE 2 CAPSULES THREE TIMES A  capsule 1   • cetirizine (ZYRTEC) 10 MG tablet TAKE 1 TABLET DAILY (NEED TO BE SEEN. LAST SEEN 8/23/16 AND WAS TO FOLLOW UP IN 1 YEAR. PLEASE FOLLOW UP PRIOR TO ANY FURTHER REFILLS) 30 tablet 0   • DEPAKOTE 500 MG delayed release EC tablet 1 tab in am and 2 tabs in pm 270 tablet 3   • escitalopram (LEXAPRO) 20 MG tablet Take 1-1/2 tablets in  William Portillo  8/27/2022  [id number]    Chief Complaint: Right hemisphere, cerebral infarction [facility])    Subjective:   No overnight events. No family present this AM. Still feeling depressed. ROS: No CP, SOB, dyspnea    Objective:  Patient Vitals for the past 24 hrs:   BP Temp Temp src Pulse Resp SpO2 Weight   08/27/22 0729 130/73 98.5 °F (36.9 °C) Oral (!) 103 18 100 % --   08/27/22 0507 108/63 97.9 °F (36.6 °C) Oral 78 17 99 % 132 lb 15 oz (60.3 kg)   08/27/22 0048 118/64 97.1 °F (36.2 °C) Oral 96 17 99 % --   08/26/22 2032 124/78 98 °F (36.7 °C) Oral 98 17 99 % --   08/26/22 1459 128/65 97.7 °F (36.5 °C) Oral 100 16 100 % --   08/26/22 1343 119/74 97.7 °F (36.5 °C) Oral (!) 104 14 100 % --   08/26/22 1312 -- -- -- -- -- 96 % --   08/26/22 0956 103/61 -- -- 82 -- -- --       Gen: No distress, pleasant. Resting in bedside chair  HEENT: Normocephalic, atraumatic. CV: Regular rate and rhythm. No MRG   Resp: No respiratory distress. CTAB   Abd: Soft, nontender nondistended  Ext: No edema. Neuro: Alert, oriented, appropriately interactive. Left hemiparesis, left neglect, right facial droop    Laboratory data: Available via EMR. Therapy progress:    PT    Supine to Sit: Substantial/maximal assistance  Sit to Supine:     Sit to Stand: Partial/moderate assistance  Chair/Bed to Chair Transfer: Substantial/maximal assistance  Car Transfer: Substantial/maximal assistance  Ambulation 10 ft: Substantial/maximal assistance  Ambulation 50 ft:    Ambulation 150 ft:    Stairs - 1 Step: Substantial/maximal assistance  Stairs - 4 Step:    Stairs - 12 Step:      OT    Eating: Partial/moderate assistance  Oral Hygiene: Partial/moderate assistance  Bathing: Substantial/maximal assistance  Upper Body Dressing: Substantial/maximal assistance  Lower Body Dressing: Dependent  Toilet Transfer: Dependent  Toilet Hygiene: Dependent    Speech Therapy         Body mass index is 21.46 kg/m².     Assessment:  Patient Active the morning. 135 tablet 3   • butalbital-APAP-caffeine (FIORICET) -40 MG per tablet Take 1 tablet by mouth every 4 hours as needed for headache. Must last 30 days. 30 tablet 5   • rabeprazole (ACIPHEX) 20 MG tablet Take one tablet twice daily 30 minutes before meals 180 tablet 0   • sodium chloride 1 g tablet 1 tablet by mouth 3 (Three) Times Daily . 90 tablet 11   • azelastine (ASTELIN) 0.1 % nasal spray Spray 2 sprays in each nostril 2 times daily. Use in each nostril as directed 3 Bottle 3   • ondansetron (ZOFRAN) 4 MG tablet Take 1 tablet by mouth daily as needed for Nausea. 90 day supply 60 tablet 1   • pantoprazole (PROTONIX) 40 MG tablet Take 1 tablet by mouth daily. 30 tablet 2   • pravastatin (PRAVACHOL) 20 MG tablet Take 20 mg by mouth daily.     • furosemide (LASIX) 20 MG tablet Take 1 tablet by mouth 2 times daily. 2 tablet 0   • ipratropium (ATROVENT) 0.06 % nasal spray USE 2 SPRAYS IN EACH NOSTRIL THREE TIMES A DAY 15 mL 6   • albuterol 108 (90 BASE) MCG/ACT inhaler Inhale 2 puffs into the lungs every 4 hours as needed for Shortness of Breath or Wheezing.     • fluticasone-salmeterol (ADVAIR HFA) 230-21 MCG/ACT inhaler Inhale 2 puffs into the lungs 2 times daily. Rinse mouth and throat after each use 3 Inhaler 3   • Diclofenac Potassium (ZIPSOR PO) Take 100 mg by mouth 4 times daily.     • Riboflavin 400 MG CAPS Take 400 mg by mouth daily. 30 capsule 5   • DENOSumab (PROLIA) 60 MG/ML SOLN Inject 60 mg into the skin once.     • propranolol (INDERAL LA) 120 MG CP24 Take 1 capsule by mouth daily.       • B Complex Vitamins (VITAMIN-B COMPLEX) TABS Take 1 tablet by mouth daily.       • Cholecalciferol (VITAMIN D3) 2000 UNIT capsule Take 4,000 Units by mouth daily. To start after Vitamin D2 is finished     • loperamide (IMODIUM A-D) 2 MG tablet Take 1 tablet by mouth 3 times daily as needed for Diarrhea. 30 tablet 0   • azelastine (ASTELIN) 0.1 % nasal spray Spray 2 sprays in each nostril 2 times  daily. Use in each nostril as directed 3 Bottle 3     No current facility-administered medications for this visit.      Facility-Administered Medications Ordered in Other Visits   Medication Dose Route Frequency Provider Last Rate Last Dose   • Botulinum Toxin Type A (BOTOX) injection 200 Units  200 Units Intramuscular Once Pablo Kendall MD            Most Recent Immunizations   Administered Date(s) Administered   • Td:Adult type tetanus/diphtheria 2010   • Tdap 2010       Past Medical History:   Diagnosis Date   • Abnormal involuntary movements    • Abnormality of gait    • Allergy    • Anxiety    • Chronic cough 2016   • Chronic headache     Chronic Migraine; improved   • Colitis    • COPD (chronic obstructive pulmonary disease) (CMS/[facility])    • Edema of both legs    • Fibromyalgia    • Fractures     jaw postictally   • IBS (irritable bowel syndrome)    • Low back pain    • Lumbosacral radiculitis    • Memory loss    • Micrococcus intracellularis    • Osteoporosis    • Pneumonia    • Pulmonary embolus (CMS/[facility])     age 22 , unknown origin   • RAD (reactive airway disease)    • Seizure disorder (CMS/[facility])     last seizure    • Slurring of speech    • Urinary frequency        Past Surgical History:   Procedure Laterality Date   • BACK SURGERY N/A 2015   • CARPAL TUNNEL RELEASE Bilateral 2013   • COLONOSCOPY W BIOPSY  2012, 2016   • ESOPHAGOGASTRODUODENOSCOPY  2016    also 2010   • MANDIBLE FRACTURE SURGERY      Falling during a seizure   • NISSEN FUNDOPLICATION  2016    toupet   • SPINAL CORD STIMULATOR IMPLANT  2015    HAS BEEN REMOVED        Family History   Problem Relation Age of Onset   • Cancer Mother       colon   • Heart disease Father    • Cancer Maternal Aunt       x 2  colon   • Cancer Maternal Grandmother       colon       Social History     Social History   • Marital status:      Spouse name: Francisco   • Number of children: 0    • Years of education: N/A     Occupational History   • at home      Social History Main Topics   • Smoking status: Current Every Day Smoker     Packs/day: 0.50     Years: 20.00     Types: Cigarettes   • Smokeless tobacco: Never Used   • Alcohol use No   • Drug use: No   • Sexual activity: Yes     Partners: Male     Birth control/ protection: None      Comment: Reports dyspareunia unrelieved by OTC lubricants     Other Topics Concern   • Caffeine Concern Yes     2 cups coffee daily   • Exercise Yes     walking daily     Social History Narrative   • No narrative on file        REVIEW OF SYSTEMS:   Cardiovascular problem(s): negative  Respiratory problem(s): negative  Gastrointestinal problem(s): bloating and abdominal pain.     Objective:  Visit Vitals  /82   Pulse 68   Temp 97.4 °F (36.3 °C) (Oral)   Resp 16   Ht 5' 7\" (1.702 m)   Wt 64.9 kg   LMP 03/19/2012   BMI 22.40 kg/m²     BODY MASS INDEX: Body mass index is 22.4 kg/m².    PHYSICAL EXAMINATION:    Examination of the patient reveals:   GENERAL: appears stated age and well developed and well nourished  SKIN normal color, normal texture and normal turgor  HEAD: normocephalic  NOSE: external nose is normal to inspection  HEART: normal rate and rhythm, no murmurs and no extra heart sounds  ABDOMEN: abdomen is soft, normal active bowel sounds and nontender. Mild bloating.   NEUROLOGIC: cranial nerves 2 through 12 grossly normal, coordination normal and no tremor noted  EXTREMITIES: normal muscle tone and development bilaterally  LUNGS: Normal respiratory effort. lungs are clear to auscultation with normal inspiratory/expiratory sounds, no rales, no rhonchi and no wheezes  PSYCHIATRIC: AO (Alert and oriented), normal affect    Radha was seen today for abdominal cramping.    Diagnoses and all orders for this visit:    Abdominal bloating: She presents to clinic today to establish care with new primary care doctor. She also just wanted to go over her abdominal  bloating. The abdominal bloating started after her Nissen fundoplication that was done last November. The patient does have an appointment with GI coming up later this month for an endoscopy. In clinic today she had no abdominal pain with palpation. She had normal active bowel sounds. She did have just mild abdominal bloating. This bloating has been going on for the last approximately 10 months or so. We did discuss a plan of care and at this point we will have her get endoscopy first to see if there are any concerns on that exam. If this continues we will discuss either with her surgeon or her gastroenterologist if there are any further recommendations regarding this. The patient was agreeable to this plan. We did however discussed that if she gets any new acute symptoms she should call or return to clinic. She can expressed understanding. We did review otherwise review what she is due for in clinic today. She is due for her mammogram and this is been ordered. She also is due for hepatitis C screening. She wanted this ordered in case she had blood work done in the near future. Order was placed.    Visit for screening mammogram  -     MAMMO SCREENING BILATERAL; Future    Need for hepatitis C screening test  -     HEPATITIS C ANTIBODY WITH REFLEX; Future    FOLLOW UP:  Pending endoscopy/ above. Return when due for annual

## 2022-08-27 NOTE — PROGRESS NOTES
Patient admitted to rehab with CVA. A/Ox4. Transfers with gait belt and stedy x1-2. Mobility restrictions: heavy left lean, flaccid on left side. On easy to chew diet, tolerating well. Medications taken whole in applesauce. On eliquis for DVT prophylaxis. Skin: skin tears bilateral inner buttocks Oxygen: on RA. LDA: none. Has been incontinent at times of bowel and incontinent at times of bladder. LBM 8/26. Chair/bed alarms in use and call light in reach. Will monitor for safety.

## 2022-08-27 NOTE — PROGRESS NOTES
Physical Therapy  Facility/Department: [facility]  Rehabilitation Physical Therapy Treatment Note    NAME: Cintia Fofana  : 1950 (60 y.o.)  MRN: [id number]  CODE STATUS: Limited    Date of Service: 22       Restrictions:  Restrictions/Precautions: Fall Risk;Up as Tolerated; Modified Diet  Position Activity Restriction  Other position/activity restrictions: diet soft and bite sized; meds crushed in pureed; teley; high fall risk; L NEGLECT & hemiparesis     SUBJECTIVE  Subjective  Subjective: Patient in bed supine ,gaze towards R side with minimum eye contact, patient agreeable to therapy but reports \" I know what I want, I feel like know one is listening\". Patient with no pain. Patient does not want to get in shower. Patient concerned about missing dentures, looked around room, unable to find. Nurse is aware. Pain: none reported        OBJECTIVE  Cognition  Overall Cognitive Status: Exceptions  Arousal/Alertness: Appropriate responses to stimuli  Following Commands: Follows one step commands with increased time; Follows one step commands with repetition  Attention Span: Attends with cues to redirect  Memory: Decreased recall of precautions;Decreased short term memory (did not recall prior therpists names/what OT/PT speech does etc. specifics of what she did yesterday etc.)  Safety Judgement: Decreased awareness of need for safety  Problem Solving: Assistance required to correct errors made;Assistance required to identify errors made;Decreased awareness of errors  Insights: Decreased awareness of deficits  Initiation: Requires cues for some  Sequencing: Requires cues for some  Cognition Comment: Pt dem poor insight as demanding to go home tomorrow when this is not a safe option D/T current assist level. Orientation  Overall Orientation Status: Within Functional Limits (BIMs 14/15 this date)  Orientation Level: Oriented X4    Functional Mobility  Bed Mobility  Overall Assistance Level:  Moderate Assistance  Roll Right  Assistance Level: Contact guard assist;Minimal assistance (with cues to reach with R UE)  Supine to Sit  Assistance Level: Moderate assistance  Skilled Clinical Factors: slight assist for trunk and L LE  Scooting  Assistance Level: Moderate assistance  Skilled Clinical Factors: cued to scoot  Balance  Sitting Balance: Moderate assistance (Patient initially LOB towards L side with moderate assist, CGA once centered on bed, Patient assist with dressing with LEs with moderate assist to maintain balance)  Standing Balance: Maximum assistance (patient can vary between min-max assist dependent on verbal and visual cues to correct left lean, and also PT blocking left knee and facilitating hip extension, LOB towards L side especially as fatigue.)  Standing Balance  Activity: stood at rail with bobath sling on left arm, limited WB through  Transfers  Surface: From chair with arms; To chair with arms;Standard toilet  Additional Factors: Verbal cues; Hand placement cues; Increased time to complete  Device:  (rail in hallway)  Sit to Stand  Assistance Level: Moderate assistance;Minimal assistance (min assist if pull up from rail but moderate assist if push off w/c)  Skilled Clinical Factors: stood from w/c and bed  with PT blocking left knee and assisting with left hip extension. Assist with OT for depends and pants, leans towards L side, pivot transfers with mod assist with min assist of another. Patient stood in bathromm to pull pants down and up with moderate assist of 2, .  Stand to Sit  Assistance Level: Minimal assistance; Moderate assistance  Skilled Clinical Factors: with knee blocked and assistance at trunk to slow descent  Bed To/From Chair  Technique:  (partial stand pivot)  Assistance Level: Moderate assistance; Requires x 2 assistance (min assist of another,)  Skilled Clinical Factors: difficulty pivoting on feet and fearful of falling to the right.  Patient pushes with right UE and is resistant to pivoting that way. Performed pivot stand towards R side with moderate assist and minimal assist of another to chair. Toilet transfers with mod/  max assist to of 2 , need to block knee , turn towards L side. Patient needs assistance positioning left foot before transfer and requires blocking of left knee. Environmental Mobility  Ambulation  Surface: Level surface  Device:  (R cerrato rail)  Distance: 10', (16' with Mini Dumont)  Additional Factors: Hand placement cues; Verbal cues; Increased time to complete (L DF assist)  Assistance Level: Moderate assistance; Requires x 2 assistance  Gait Deviations: Decreased weight shift left;Decreased step length left; Unsteady gait; Path deviations; Slow eldon  Skilled Clinical Factors: unsteady gait, (Second attempt used [employer] frame with with moderate assist of 2, assist to advance L LE and block L hip to keep from flexinfg with occasional block L hip from flexing and occasional block L knee, tends to be in genu recurvatum knee-  while ambulating telemetry monitor stated increased to 160 bpm, baseline 105 bpm))  Wheelchair  Surface: Level surface  Device: Standard wheelchair;Standard Cushion  Additional Factors: Verbal cues; Hand placement cues; Increased time to complete  Assistance Required to Manage Parts: Brakes  Assistance Level for Propulsion: Minimal assistance  Propulsion Method: Right lower extremity;Right upper extremity  Propulsion Quality: Veers right;Decreased fluidity; Slow velocity  Propulsion Distance: 141'  Skilled Clinical Factors: slow with veering towards R side , assist to correct      Neuromuscular Education  NDT Treatment: Standing  Facilitation techniques: in stand , used mirror to weight shift and for ambulation with rail, moderate assist in stand      PT Exercises  PROM Exercises: active hip and knee flex/extension, noted some assist into extension when resist, hip abduction  5 reps      ASSESSMENT/PROGRESS TOWARDS GOALS Assessment  Assessment: Ms. Kaleb Patterson was agreeable to therapy but expressed desire for control with treatment. Patient 's concerned about missing dentures in which OT did not see in mouth but were eventually found in her mouth. 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 Sit to stand when pull up on rail with minimal assist but when asked to push up from w/c need moderate assist .  Patient able to ambulate with Nahum Ponce frame 16' with moderate assist of 2, noted knee extension with WB . Patient's sitting balance varied up to moderate assist when request to perform task. Patient continues to be below baseline and will benefit from continued skilled therapy for strengthening, neuromuscular re-education, functional mobility training, and gait training to improve independence. Patient requires co treat once a day due to amount of assist needed to perform functional mobility. Activity Tolerance: Patient limited by endurance; Patient tolerated treatment well (limited ROM  Simultaneous filing. User may not have seen previous data.)  Discharge Recommendations: Continue to assess pending progress; Patient would benefit from continued therapy after discharge (Patient talked to nurse about leaving tommorrow)  Factors Affecting Discharge: limited insight , L neglect  PT Equipment Recommendations  Equipment Needed: Yes  Other: COntinue to assess DME needs with functional mobility progression.     Goals  Patient Goals   Patient goals : \"to get out of her with some independence\"  Short Term Goals  Time Frame for Short term goals: 1-2 weeks  Short term goal 1: Bed mobility min assist  Short term goal 2: Transfer sit <-> stand min assist  Short term goal 3: Pt will be able to ambulate with LRAD 50' with moderate assistance  Short term goal 4: Pt will be able to ascend/descend 4 steps with unilateral railing mod assistance  Short term goal 5: Pt will be able to ascend/descend curb step LRAD with moderate assistance  Long Term Goals  Time Frame for Long term goals : 3-4 weeks  Long term goal 1: Bed mobility CGA for all aspects  Long term goal 2: Transfer sit <_> stand CGA  Long term goal 3: Pt will be able to ambulated 36' with LRAD CGA-min assist  Long term goal 4: wheelchair mobility 150' with LRAD supervision  Long term goal 5: Ascend/descend 12 steps with unilateral railing min assist  Additional Goals?: Yes  Long term goal 6: Ascend/descend curb step with LRAD min assist    PLAN OF CARE/SAFETY  Plan  Plan:  minutes of therapy at least 5 out of 7 days a week  Plan weeks: 3-4 weeks  Current Treatment Recommendations: Strengthening;Balance training;Functional mobility training;Transfer training;Neuromuscular re-education; Safety education & training;Patient/Caregiver education & training;ADL/Self-care training;Gait training; Wheelchair mobility training; Endurance training;Home exercise program;Equipment evaluation, education, & procurement;Stair training;Positioning  Safety Devices  Type of Devices: All fall risk precautions in place;Gait belt;Left in chair;Chair alarm in place;Call light within reach;Nurse notified (in wheelchair to return to room with transportation)  Restraints  Restraints Initially in Place: No    EDUCATION  Education  Education Given To: Patient; Family  Education Provided: Role of Therapy; Mobility Training; Safety; Fall Prevention Strategies; Plan of Care  Education Provided Comments: use of mirror for visual input, hand placement with transfers and bending forward  Education Method: Verbal  Barriers to Learning: Other (Comment) (emotional)  Education Outcome: Continued education needed        Therapy Time   Individual Concurrent Group Co-treatment   Time In       0950   Time Out       1120   Minutes       90     Timed Code Treatment Minutes: [address] [facility], PT, 08/27/22 at 11:56 AM

## 2022-08-27 NOTE — PLAN OF CARE
Problem: Discharge Planning  Goal: Discharge to home or other facility with appropriate resources  8/27/2022 1259 by Calderon Gunderson RN  Outcome: Progressing  Flowsheets (Taken 8/27/2022 1259)  Discharge to home or other facility with appropriate resources:   Identify barriers to discharge with patient and caregiver   Identify discharge learning needs (meds, wound care, etc)   Refer to discharge planning if patient needs post-hospital services based on physician order or complex needs related to functional status, cognitive ability or social support system  8/27/2022 0412 by Kyler Burch RN  Outcome: Progressing     Problem: Safety - Adult  Goal: Free from fall injury  8/27/2022 1259 by Calderon Gunderson RN  Outcome: Progressing  Flowsheets (Taken 8/27/2022 1259)  Free From Fall Injury: Instruct family/caregiver on patient safety  8/27/2022 0412 by Kyler Burch RN  Outcome: Progressing     Problem: Skin/Tissue Integrity  Goal: Absence of new skin breakdown  Description: 1. Monitor for areas of redness and/or skin breakdown  2. Assess vascular access sites hourly  3. Every 4-6 hours minimum:  Change oxygen saturation probe site  4. Every 4-6 hours:  If on nasal continuous positive airway pressure, respiratory therapy assess nares and determine need for appliance change or resting period.   8/27/2022 1259 by Calderon Gunderson RN  Outcome: Progressing  8/27/2022 0412 by Kyler Burch RN  Outcome: Progressing     Problem: ABCDS Injury Assessment  Goal: Absence of physical injury  8/27/2022 1259 by Calderon Gunderson RN  Outcome: Progressing  Flowsheets (Taken 8/27/2022 1259)  Absence of Physical Injury: Implement safety measures based on patient assessment  8/27/2022 0412 by Kyler Burch RN  Outcome: Progressing     Problem: Chronic Conditions and Co-morbidities  Goal: Patient's chronic conditions and co-morbidity symptoms are monitored and maintained or improved  8/27/2022 1259 by Isiah Winkler RN  Outcome: Progressing  Flowsheets (Taken 8/27/2022 1259)  Care Plan - Patient's Chronic Conditions and Co-Morbidity Symptoms are Monitored and Maintained or Improved:   Monitor and assess patient's chronic conditions and comorbid symptoms for stability, deterioration, or improvement   Collaborate with multidisciplinary team to address chronic and comorbid conditions and prevent exacerbation or deterioration  8/27/2022 0412 by Pinky Koch RN  Outcome: Progressing     Problem: Pain  Goal: Verbalizes/displays adequate comfort level or baseline comfort level  8/27/2022 1259 by Isiah Winkler RN  Outcome: Progressing  Flowsheets (Taken 8/27/2022 1259)  Verbalizes/displays adequate comfort level or baseline comfort level:   Encourage patient to monitor pain and request assistance   Assess pain using appropriate pain scale  8/27/2022 0412 by Pinky Koch RN  Outcome: Progressing  Flowsheets (Taken 8/27/2022 0880)  Verbalizes/displays adequate comfort level or baseline comfort level:   Encourage patient to monitor pain and request assistance   Administer analgesics based on type and severity of pain and evaluate response   Consider cultural and social influences on pain and pain management   Assess pain using appropriate pain scale   Implement non-pharmacological measures as appropriate and evaluate response   Notify Licensed Independent Practitioner if interventions unsuccessful or patient reports new pain  Note: Able to rate pain using a 1-10 scale, medicated per prn orders, see MAR.  Able to verbalize a reduction in pain and/or able to fall asleep and remain asleep without any s/s of pain       Problem: Nutrition Deficit:  Goal: Optimize nutritional status  8/27/2022 1259 by Isiah Winkler RN  Outcome: Progressing  Flowsheets (Taken 8/27/2022 1259)  Nutrient intake appropriate for improving, restoring, or maintaining nutritional needs:   Assess nutritional status and recommend course of action   Monitor oral intake, labs, and treatment plans   Recommend appropriate diets, oral nutritional supplements, and vitamin/mineral supplements  8/27/2022 0412 by Wilmar Hernandez, RN  Outcome: Progressing

## 2022-08-27 NOTE — PLAN OF CARE
Problem: Pain  Goal: Verbalizes/displays adequate comfort level or baseline comfort level  Outcome: Progressing  Flowsheets (Taken 8/27/2022 0412)  Verbalizes/displays adequate comfort level or baseline comfort level:   Encourage patient to monitor pain and request assistance   Administer analgesics based on type and severity of pain and evaluate response   Consider cultural and social influences on pain and pain management   Assess pain using appropriate pain scale   Implement non-pharmacological measures as appropriate and evaluate response   Notify Licensed Independent Practitioner if interventions unsuccessful or patient reports new pain  Note: Able to rate pain using a 1-10 scale, medicated per prn orders, see MAR.  Able to verbalize a reduction in pain and/or able to fall asleep and remain asleep without any s/s of pain

## 2022-08-27 NOTE — PROGRESS NOTES
Occupational Therapy  Facility/Department: [facility]  Rehabilitation Occupational Therapy Daily Treatment Note    Date: 22  Patient Name: Ansley Lloyd       Room: I2Z-3988/1314-25  MRN: [id number]  Account: [de-identified]   : 1950  (76 y.o.) Gender: female                    Past Medical History:  has no past medical history on file. Past Surgical History:   has no past surgical history on file. Restrictions  Restrictions/Precautions: Fall Risk;Up as Tolerated; Modified Diet  Other position/activity restrictions: diet soft and bite sized; meds crushed in pureed; no straws,tele; high fall risk; L NEGLECT & hemiparesis, no straws    Subjective  Subjective: Pt met in room with PT for initial co-tx. Pt denied pain. Initially refused ADL tasks as stated \"I don't need to do that my dtr is coming & she'll do it for me\". Adamantly refused a shower. stated \"I don't get dirty I just lay around all day\" Pt agreeable to OT/PT co-tx session to work on transfers. Denied pain. Restrictions/Precautions: Fall Risk;Up as Tolerated; Modified Diet             Objective     Cognition  Overall Cognitive Status: Exceptions  Arousal/Alertness: Appropriate responses to stimuli  Following Commands: Follows one step commands with increased time; Follows one step commands with repetition  Attention Span: Attends with cues to redirect  Memory: Decreased recall of precautions;Decreased short term memory (did not recall prior therpists names/what OT/PT speech does etc. specifics of what she did yesterday etc.)  Safety Judgement: Decreased awareness of need for safety  Problem Solving: Assistance required to correct errors made;Assistance required to identify errors made;Decreased awareness of errors  Insights: Decreased awareness of deficits  Initiation: Requires cues for some  Sequencing: Requires cues for some  Cognition Comment: Pt dem poor insight as demanding to go home tomorrow when this is not a safe option D/T current assist level. Orientation  Overall Orientation Status: Within Functional Limits (BIMs 14/15 this date)  Orientation Level: Oriented X4         ADL  Grooming/Oral Hygiene  Assistance Level: Set-up; Verbal cues  Skilled Clinical Factors: Seated at sink in w/c. Pt c/o tooth brush too hard. OT brought toothettes for room. Pt brushed teeth with mouthwash on toothette/washed face with set-up/supervision. Discovered missing her partial. OT/PT/nurse looked for her denture & unable to find. She called her dtr & dtr did not have them. After session dtr told nurse her partial was in her mouth. Pt fixated on this intermittently throughout session. Upper Extremity Bathing  Skilled Clinical Factors: pt declined  Lower Extremity Bathing  Skilled Clinical Factors: pt declined  Upper Extremity Dressing  Skilled Clinical Factors: pt declined  Lower Extremity Dressing  Assistance Level: Maximum assistance;Dependent  Skilled Clinical Factors: Seated EOB, dep to don LLE into pants, max A for RLE into pants, 2nd person for sitting balance , stood w/ A x2 dep to pull up pants. Putting On/Taking Off Footwear  Assistance Level: Dependent  Skilled Clinical Factors: dep for footies  Toileting  Assistance Level: Dependent  Skilled Clinical Factors: Pt stood @ toilet grab bar mod A x2 & dep for depends off/& to don/pull up clean ones/pants. Toilet Transfers  Technique: Stand pivot  Equipment: Standard toilet;Grab bars  Additional Factors: Set-up; Verbal cues;Cues for hand placement; With handrails  Assistance Level: Dependent; Moderate assistance; Requires x 2 assistance  Skilled Clinical Factors: w/c> toilet mod A x2 pivot to L, stood mod A 1-2, pivot to w/c to R w/ grab bar mod A x2. Mod A to scoot back into w/c. Sling in place all transfers/standing tasks.           Functional Mobility  Device:  (w/c, rail and easy walker)  Skilled Clinical Factors: Pt propelled w/c in [place] min A for steering uisng RUE/RLE ~ 141' + cues to avoid objects on L. Pt amb in hallway with us of R rail with L foot ace wrapped for 10 feet with mod A x1 & min A 2nd person. Assist for trunk support and to advance LLE. Pt flexed at upper trunk & R lean on rail. Bobath style sling in place. Pt sat to rest. 2nd trial amb w/ Nahummillie Ortegase walker: Pt amb 16' with mod A x2 for trunk support at hips & upper trunk and for LUE WB/to control Emily walker. Pt with much more erect posture on Emily walker. Mirror used for feedback. Bed Mobility  Overall Assistance Level: Moderate Assistance  Roll Left  Assistance Level: Minimal assistance  Skilled Clinical Factors: HOB elevated, rail  Sit to Supine  Assistance Level: Moderate assistance  Skilled Clinical Factors: HOB elevated, rail used  Supine to Sit  Assistance Level: Moderate assistance  Scooting  Assistance Level: Moderate assistance  Skilled Clinical Factors: several times to scoot back into w/c/recliner  Transfers  Surface: Wheelchair;From bed; To chair with arms  Additional Factors: Hand placement cues; Verbal cues  Sit to Stand  Skilled Clinical Factors: from w/c @ rail min A(pulling up)  Bed To/From Chair  Assistance Level: Moderate assistance; Requires x 2 assistance;Dependent  Skilled Clinical Factors: to R mod Ax1 and min A 2nd person bed> w/c. w/c>recliner to R mod A x2. Mod A to get scooted back pillow tucked at sides as chair too wide & pillow under LUE. Assessment  Assessment  Assessment: Pt seen in room and hallway for OT/PT co-tx D/T current assist level. Pt declined ADL session today but did participate in grooming with SBA seated, and toileting dep in bathroom. She amb in hallway with mod A x2 w/ rail 1x 10' and 2nd time 12' with Nahum Ponce walker. Pt progressed with mob but constant cues/assist/encouragement D/T severe L neglect & L hemiparesis. Pt transferred w/ mod A x2 stand pivot >< chair and toilet in bathroom.   Rec cont'd co-tx 1x/day to progress mob tasks as pt reported her primary goal is to be able to transfer A  Short Term Goal 3: Complete dressing using AE and umu techniques with mod A  Short Term Goal 4: Complete grooming seated at sink with SBA  Short Term Goal 5: Complete functional transfers via stand pivot w/ mod A  Long Term Goals  Time Frame for Long term goals :  Within 2-4 weeks Pt will  Long Term Goal 1: Complete toileting w/ CGA  Long Term Goal 2: Complete bathing seated on BSC/SC using AE with SBA  Long Term Goal 3: Complete dressing using AE and umu techniques with CGA  Long Term Goal 4: Complete grooming seated at sink mod I  Long Term Goal 5: Complete functional transfers via stand pivot w/ CGA  Additional Goals?: Yes  Long Term Goal 6: Complete caregiver education and DME needs prior to discharge                 Therapy Time   Individual Concurrent Group Co-treatment   Time In       0950   Time Out       1120   Minutes       90   Timed Code Treatment Minutes: Perry Levy OT  Electronically signed by FLORENTIN Diaz/L  License # [id number]

## 2022-08-27 NOTE — PROGRESS NOTES
ACUTE REHAB UNIT  SPEECH/LANGUAGE PATHOLOGY      [x] Daily  [] Weekly Care Conference Note  [] Discharge    Patient:Meryl Martinez      :1950  RFN:[id number]  Rehab Dx/Hx: Right hemisphere, cerebral infarction ([facility] [address] [I63.9]    Precautions: falls  Home situation: Lives with james THURMAN Dx: [] Aphasia  [x] Dysarthria  [] Apraxia   [x] Oropharyngeal dysphagia [x] Cognitive   Impairment  [] Other:   Initial Speech Therapy Assessment Diagnosis:   Speech Therapy Diagnosis  Cognitive Diagnosis: Pt was oriented to place/date/time; and demonstrated functional language skills and functional concrete VPS/PS and reasoning and math language skills. Deficits in higher level attention; working memory and STM. Additional assessment of PS/reasoning for higher level cognitive-lingusitic skills for adv DL requiring higher level executive function. Pt with reduced eye contact and flat affect; ongoing assessemnt for visual attention. Pt 's visual scanning and attention for reading tasks was functional.  Speech Diagnosis: Residual Dysarthria ncharacterized by occasinal phonemic distortions 2/2 to left oral motor muscular weakness and reduced coordination. Despite this oral motor connected speech is audible and intelligible. Communication Diagnosis: Receptive language processing and verbal expressive language skills appeared functional for concrete and mild complex.   Dysphagia Diagnosis: Mild to moderate oral stage dysphagia, Mild pharyngeal stage dysphagia  Date of Admit: 2022  Room #: F5N-3915/3260-01  Date: 2022       Current functional status (updated daily):         Current Diet Order:ADULT ORAL NUTRITION SUPPLEMENT; Lunch, Dinner; Standard High Calorie/High Protein Oral Supplement  ADULT DIET; Easy to Mayi Zhaopin   Communication: []WFL  [] Aphasia  [x] Dysarthria  [] Apraxia  [] Pragmatic Impairment [] Non-verbal  [] Hearing Loss  [] Other:   Cognition: [] WFL  [x] Mild  [] Moderate  [] Severe [] Unable to Assess [] Other:  Memory: [] WFL  [x] Mild  [] Moderate  [] Severe [] Unable to Assess  [] Other:  Behavior: [x] Alert  [x] Cooperative  [x]  Pleasant  [] Confused  [] Agitated  [] Uncooperative  [] Distractible [] Motivated  [] Self-Limiting [] Anxious  [x] Other: flat affect  Endurance:  [x] Adequate for participation in SLP sessions  [] Reduced overall  [] Lethargic  [] Other:  Safety: [] No concerns at this time  [] Reduced insight into deficits  []  Reduced safety awareness [] Not following call light procedures  [] Unable to Assess  [x] Other: TBD  Swallowing Precautions: Sit up for all meals and thereafter for 30 minutes, Alternate solids with liquids, and Check mouth for food residue after snacks and meals  Barriers toward progress: TBD       Date: 8/27/2022      Tx session 1 Tx session 2   Total Timed Code Min Time In: 0685  SLP Individual Minutes  Time In: 5450  Time Out: 6515  Minutes: 30  Coded treatment time  20 N/A   Group Treatment Minutes 0 0   Co-Treat Minutes 0 0   Variance/Reason:      Pain     Pain Intervention [] RN notified  [] Repositioned  [] Intervention offered and patient declined  [] N/A  [] Other: [] RN notified  [] Repositioned  [] Intervention offered and patient declined  [] N/A  [] Other:   Subjective     Patient pleasant and cooperative, patient seen in room    Objective:  Goals       Short-term Goals  Timeframe for Short-term Goals: Pt goal is to eat more mores and less wet foods     Ongoing    Dysphagia Goals: The patient will tolerate recommended diet without observed clinical signs of aspiration Ongoing, tolerated thin liquids with no overt s/s of aspiration or penetration via cup's edge    The patient/caregiver will demonstrate understanding of compensatory strategies for improved swallowing safety.  Reviewed safe swallowing strategies, risk of pocketing on left side    The patient will improve oral preparation phase via bolus control/manipulation exercises to 5/5 each trial.      No solid intake this session     Short-term Goals  Timeframe for Short-term Goals: Pt's goal is to get home and care for self   Ongoing    Goal 1: Pt will participate in ongoing assessment of VPS/PS for adv DL tasks requiring higher level cognitive-linguistic skills for executive fucntion with additional goals PRN Ongoing    Goal 2: Pt will demonstrate improved recall of daily events; learned safety strategies/techniques and new information with use of compensatory strategies and <mild assist Patient completed working memory task via n-back. Patient completed 1-back 2-factor with 90% accuracy given min verbal cues    Goal 3: Pt will demonstrate functional VPS/PS for daily living situations and participation in adv DL situations with mild assist Patient completed functional reading related to ads and coupons with min cues for attention to left side, located information with extended time for processing with 100% accuracy given min verbal cues      Timeframe for Short-term Goals: Pt's goal is to get home and care for self     Ongoing    Other areas targeted:     Education:   Educated on purpose of ST services/goals    Safety Devices: [x] Call light within reach  [] Chair alarm activated  [x] Bed alarm activated  [] Other: [] Call light within reach  [] Chair alarm activated  [] Bed alarm activated  [] Other:    Progress Assessment:    Plan: Continue as per plan of care.    Continued Tx Upon Discharge: ?    [] Yes [] No [x] TBD based on progress while on ARU [] Vital Stim indicated [] Other:   Estimated discharge date: TBD   Discharge recommendations:   [] Home independently  [] Home with assistance [x]  24 hour supervision  [] ECF [] Other:     Additional information:     Interventions used during Rehab Stay:  [] Speech/Language Treatment  [] Instruction in HEP [] Group [x] Dysphagia Treatment [x] Cognitive Treatment   [] Other:    Adverse Reactions to Treatment/Significant Change in Status:     Harvinder Mares MA, CCC-SLP #[id number]  Speech-Language Pathologist  On 08/27/22 at 8:35 AM

## 2022-08-27 NOTE — PROGRESS NOTES
Patient states that she plans on signing herself out [facility]  tomorrow. She states that she doesn't want to continue rehab anymore and she thinks that she will get better at home. Patient given encouragement several times to reconsider and stay to benefit from rehab and family education provided by the therapists. Patient's daughter, Charolet Hamman, at bedside and states that the family is aware of patient's wishes and are willing to take her home if she signs herself out. Notified Dr. Estella Ferreira about patient's plans to leave AMA. Also spoke to Halima Brewer with social work and confirmed that no home care services or transportation can be arranged by social work here if patient leaves AMA. Explained to patient and her daughter, Charolet Hamman, that we will not be able to arrange home care services or assist with patient's transportation including assistance with car transfer if patient leaves AMA. Patient and daughter verbalized understanding. Daughter, Charolet Hamman, stated that they are taking care of setting up home care services and will figure out transportation.

## 2022-08-28 VITALS
BODY MASS INDEX: 21.36 KG/M2 | WEIGHT: 132.94 LBS | SYSTOLIC BLOOD PRESSURE: 126 MMHG | HEIGHT: 66 IN | OXYGEN SATURATION: 95 % | HEART RATE: 76 BPM | DIASTOLIC BLOOD PRESSURE: 73 MMHG | RESPIRATION RATE: 16 BRPM | TEMPERATURE: 98.2 F

## 2022-08-28 PROCEDURE — 6370000000 HC RX 637 (ALT 250 FOR IP): Performed by: PHYSICAL MEDICINE & REHABILITATION

## 2022-08-28 RX ADMIN — CLOPIDOGREL BISULFATE 75 MG: 75 TABLET ORAL at 08:30

## 2022-08-28 RX ADMIN — SERTRALINE 25 MG: 25 TABLET, FILM COATED ORAL at 08:30

## 2022-08-28 RX ADMIN — METOPROLOL SUCCINATE 37.5 MG: 25 TABLET, EXTENDED RELEASE ORAL at 08:30

## 2022-08-28 RX ADMIN — APIXABAN 5 MG: 5 TABLET, FILM COATED ORAL at 08:30

## 2022-08-28 ASSESSMENT — PAIN SCALES - GENERAL
PAINLEVEL_OUTOF10: 0
PAINLEVEL_OUTOF10: 0

## 2022-08-28 NOTE — PROGRESS NOTES
William Portillo  8/28/2022  [id number]    Chief Complaint: Right hemisphere, cerebral infarction [facility])    Subjective:   No overnight events. Resting in bed. Patient apparently discussed possibility of leaving AMA with nursing yesterday but did not mention to me this AM.     ROS: No CP, SOB, dyspnea    Objective:  Patient Vitals for the past 24 hrs:   BP Temp Temp src Pulse Resp SpO2   08/28/22 0752 126/73 98.2 °F (36.8 °C) Oral 76 16 95 %   08/27/22 2025 131/75 98.2 °F (36.8 °C) Oral 91 16 98 %   08/27/22 1751 137/66 98.2 °F (36.8 °C) Oral 96 16 97 %   08/27/22 1439 121/66 97.9 °F (36.6 °C) Oral (!) 106 16 98 %       Gen: No distress, pleasant. Resting in bedside chair  HEENT: Normocephalic, atraumatic. CV: Regular rate and rhythm. No MRG   Resp: No respiratory distress. CTAB   Abd: Soft, nontender nondistended  Ext: No edema. Neuro: Alert, oriented, appropriately interactive. Left hemiparesis, left neglect, right facial droop    Laboratory data: Available via EMR. Therapy progress:    PT    Supine to Sit: Substantial/maximal assistance  Sit to Supine:     Sit to Stand: Partial/moderate assistance  Chair/Bed to Chair Transfer: Substantial/maximal assistance  Car Transfer: Substantial/maximal assistance  Ambulation 10 ft: Substantial/maximal assistance  Ambulation 50 ft:    Ambulation 150 ft:    Stairs - 1 Step: Substantial/maximal assistance  Stairs - 4 Step:    Stairs - 12 Step:      OT    Eating: Partial/moderate assistance  Oral Hygiene: Partial/moderate assistance  Bathing: Substantial/maximal assistance  Upper Body Dressing: Substantial/maximal assistance  Lower Body Dressing: Dependent  Toilet Transfer: Dependent  Toilet Hygiene: Dependent    Speech Therapy         Body mass index is 21.46 kg/m².     Assessment:  Patient Active Problem List   Diagnosis    Acute cerebrovascular accident (CVA) due to occlusion of right middle cerebral artery (HCC)    Paroxysmal atrial fibrillation (HCC)    Mass of upper lobe of right lung    Right hemisphere, cerebral infarction [facility])       Plan:   Acute right corona radiata infarct  - plavix, statin, anticoagulation  - secondary prevention with blood pressure control  - paxil to zoloft for SE profile     Left hemiparesis  - PT/OT  - Positioning     Afib  - rate controlled  - toprol 50 -> 37.5  - anticoag with eliquis     R apical lung mass  - patient deferred workup       Dysphagia  - slp  - dysphagia diet    Bowel: Per protocol  Bladder: Per protocol  Sleep: Trazodone PRN  Pain: tylenol, tramadol PRN  DVT PPx: eliquis  STEWART: TBD    Zachary Colón MD 8/28/2022, 9:30 AM     * This document was created using dictation software. While all precautions were taken to ensure accuracy, errors may have occurred. Please disregard any typographical errors.

## 2022-08-28 NOTE — PLAN OF CARE
Problem: Discharge Planning  Goal: Discharge to home or other facility with appropriate resources  8/28/2022 1600 by Arin Carlson RN  Outcome: Completed  8/28/2022 1129 by Arin Carlson RN  Outcome: Progressing  Flowsheets (Taken 8/28/2022 1129)  Discharge to home or other facility with appropriate resources:   Identify discharge learning needs (meds, wound care, etc)   Identify barriers to discharge with patient and caregiver     Problem: Safety - Adult  Goal: Free from fall injury  8/28/2022 1600 by Arin Carlson RN  Outcome: Completed  8/28/2022 1129 by Arin Carlson RN  Outcome: Progressing  Flowsheets (Taken 8/28/2022 1129)  Free From Fall Injury: Instruct family/caregiver on patient safety     Problem: Skin/Tissue Integrity  Goal: Absence of new skin breakdown  Description: 1. Monitor for areas of redness and/or skin breakdown  2. Assess vascular access sites hourly  3. Every 4-6 hours minimum:  Change oxygen saturation probe site  4. Every 4-6 hours:  If on nasal continuous positive airway pressure, respiratory therapy assess nares and determine need for appliance change or resting period.   8/28/2022 1600 by Arin Carlson RN  Outcome: Completed  8/28/2022 1129 by Arin Carlson RN  Outcome: Progressing     Problem: ABCDS Injury Assessment  Goal: Absence of physical injury  8/28/2022 1600 by Arin Carlson RN  Outcome: Completed  8/28/2022 1129 by Arin Carlson RN  Outcome: Progressing  Flowsheets (Taken 8/28/2022 1129)  Absence of Physical Injury: Implement safety measures based on patient assessment     Problem: Chronic Conditions and Co-morbidities  Goal: Patient's chronic conditions and co-morbidity symptoms are monitored and maintained or improved  8/28/2022 1600 by Arin Carlson RN  Outcome: Completed  8/28/2022 1129 by Arin Carlson RN  Outcome: Progressing  Flowsheets (Taken 8/28/2022 1129)  Care Plan - Patient's Chronic Conditions and Co-Morbidity Symptoms are Monitored and Maintained or Improved:   Monitor and assess patient's chronic conditions and comorbid symptoms for stability, deterioration, or improvement   Collaborate with multidisciplinary team to address chronic and comorbid conditions and prevent exacerbation or deterioration     Problem: Pain  Goal: Verbalizes/displays adequate comfort level or baseline comfort level  8/28/2022 1600 by Maria Luz Aleman RN  Outcome: Completed  8/28/2022 1129 by Maria Luz Aleman RN  Outcome: Progressing  Flowsheets (Taken 8/28/2022 1129)  Verbalizes/displays adequate comfort level or baseline comfort level:   Encourage patient to monitor pain and request assistance   Assess pain using appropriate pain scale     Problem: Nutrition Deficit:  Goal: Optimize nutritional status  8/28/2022 1600 by Maria Luz Aleman RN  Outcome: Completed  8/28/2022 1129 by Maria Luz Aleman RN  Outcome: Progressing  Flowsheets (Taken 8/28/2022 1129)  Nutrient intake appropriate for improving, restoring, or maintaining nutritional needs:   Assess nutritional status and recommend course of action   Monitor oral intake, labs, and treatment plans   Recommend appropriate diets, oral nutritional supplements, and vitamin/mineral supplements

## 2022-08-28 NOTE — CARE COORDINATION
Received a call from [employer] with concerns for patient wanting to sign out against medical advice (AMA) and worries about patient getting in & out of car safely and into home. Spoke to patient & daughter Valeria Greenwood. Daughter tells me patient & family have discussed this plan at length. Due to patient wanting to leave today, family has agreed to her decision and will provide the support she needs. Explained I could arrange medical transport home but if leaving AMA not sure it would be covered on insurance. Patient/daughter decline and family will transport patient home. Patient's grandson & son in law will assist with lifting patient into home & getting in & out of car. Patient already has a wheelchair. Per daughter, they will work with PCP in getting home care follow up. Pt & daughter deny additional needs. Rn made aware.   Electronically signed by Gaby Childers RN Case Management [phone] on 8/28/2022 at 10:51 AM

## 2022-08-28 NOTE — PLAN OF CARE
Problem: Safety - Adult  Goal: Free from fall injury  8/28/2022 0148 by Jose Schneider RN  Outcome: Progressing  Flowsheets (Taken 8/28/2022 0050)  Free From Fall Injury: Instruct family/caregiver on patient safety  Note: Patient free from falls this shift. Fall precautions in place at all times. Bed in lowest position with two side rails up and wheels locked. Call light within reach. Patient able and agreeable to contact for safety appropriately. Problem: ABCDS Injury Assessment  Goal: Absence of physical injury  8/28/2022 0148 by Joes Schneider RN  Outcome: Progressing  Flowsheets (Taken 8/28/2022 0050)  Absence of Physical Injury: Implement safety measures based on patient assessment     Problem: Pain  Goal: Verbalizes/displays adequate comfort level or baseline comfort level  8/28/2022 0148 by Jose Schneider RN  Outcome: Progressing  Note: Skin assessment performed each shift per protocol. Patient turned and repositioned every two hours and prn with pillow support. Patient checked for incontence every two hours. Uses purewick.

## 2022-08-28 NOTE — PROGRESS NOTES
Patient stated that she no longer wanted treatment and voluntarily signed AMA form. Dr. Helen Nobles, Silvino BENNETT [facility]), and Jodi Hart (clinical supervisor) aware of patient leaving AMA. Patient's daughter and grandson at bedside to take patient home. Patient transported out of the hospital by family.

## 2022-08-28 NOTE — PLAN OF CARE
Problem: Discharge Planning  Goal: Discharge to home or other facility with appropriate resources  8/28/2022 1129 by Anselmo Abad RN  Outcome: Progressing  Flowsheets (Taken 8/28/2022 1129)  Discharge to home or other facility with appropriate resources:   Identify discharge learning needs (meds, wound care, etc)   Identify barriers to discharge with patient and caregiver  8/28/2022 0148 by Jaylin Thomas RN  Outcome: Progressing  Flowsheets (Taken 8/27/2022 2000)  Discharge to home or other facility with appropriate resources: Identify barriers to discharge with patient and caregiver     Problem: Safety - Adult  Goal: Free from fall injury  8/28/2022 1129 by Anselmo Abad RN  Outcome: Progressing  Flowsheets (Taken 8/28/2022 1129)  Free From Fall Injury: Instruct family/caregiver on patient safety  8/28/2022 0148 by Jaylin Thomas RN  Outcome: Progressing  Flowsheets (Taken 8/28/2022 0050)  Free From Fall Injury: Instruct family/caregiver on patient safety  Note: Patient free from falls this shift. Fall precautions in place at all times. Bed in lowest position with two side rails up and wheels locked. Call light within reach. Patient able and agreeable to contact for safety appropriately. Problem: Skin/Tissue Integrity  Goal: Absence of new skin breakdown  Description: 1. Monitor for areas of redness and/or skin breakdown  2. Assess vascular access sites hourly  3. Every 4-6 hours minimum:  Change oxygen saturation probe site  4. Every 4-6 hours:  If on nasal continuous positive airway pressure, respiratory therapy assess nares and determine need for appliance change or resting period.   8/28/2022 1129 by Anselmo Abad RN  Outcome: Progressing  8/28/2022 0148 by Jaylin Thomas RN  Outcome: Progressing     Problem: ABCDS Injury Assessment  Goal: Absence of physical injury  8/28/2022 1129 by Anselmo Abad RN  Outcome: Progressing  Flowsheets (Taken 8/28/2022 1129)  Absence of Physical Injury: Implement safety measures based on patient assessment  8/28/2022 0148 by Erick Krishna RN  Outcome: Progressing  Flowsheets (Taken 8/28/2022 0050)  Absence of Physical Injury: Implement safety measures based on patient assessment     Problem: Chronic Conditions and Co-morbidities  Goal: Patient's chronic conditions and co-morbidity symptoms are monitored and maintained or improved  8/28/2022 1129 by Lars Delgado RN  Outcome: Progressing  Flowsheets (Taken 8/28/2022 1129)  Care Plan - Patient's Chronic Conditions and Co-Morbidity Symptoms are Monitored and Maintained or Improved:   Monitor and assess patient's chronic conditions and comorbid symptoms for stability, deterioration, or improvement   Collaborate with multidisciplinary team to address chronic and comorbid conditions and prevent exacerbation or deterioration  8/28/2022 0148 by Erick Krishna RN  Outcome: Progressing  Flowsheets (Taken 8/27/2022 2000)  Care Plan - Patient's Chronic Conditions and Co-Morbidity Symptoms are Monitored and Maintained or Improved: Monitor and assess patient's chronic conditions and comorbid symptoms for stability, deterioration, or improvement     Problem: Pain  Goal: Verbalizes/displays adequate comfort level or baseline comfort level  8/28/2022 1129 by Lars Delgado RN  Outcome: Progressing  Flowsheets (Taken 8/28/2022 1129)  Verbalizes/displays adequate comfort level or baseline comfort level:   Encourage patient to monitor pain and request assistance   Assess pain using appropriate pain scale  8/28/2022 0148 by Erick Krishna RN  Outcome: Progressing  Note: Skin assessment performed each shift per protocol. Patient turned and repositioned every two hours and prn with pillow support. Patient checked for incontence every two hours. Uses purewick.        Problem: Nutrition Deficit:  Goal: Optimize nutritional status  8/28/2022 1129 by Valery Arias BRIAN Hui  Outcome: Progressing  Flowsheets (Taken 8/28/2022 1129)  Nutrient intake appropriate for improving, restoring, or maintaining nutritional needs:   Assess nutritional status and recommend course of action   Monitor oral intake, labs, and treatment plans   Recommend appropriate diets, oral nutritional supplements, and vitamin/mineral supplements  8/28/2022 0148 by Reynold Olszewski, RN  Outcome: Progressing

## 2022-08-29 NOTE — PROGRESS NOTES
Physical Therapy  Discharge     Name:Meryl Barragan  :1950  VZI:[id number]  Room: [place] [address]    Date of Service: 2022    Per chart: patient and family decided to discharge [facility] 22.  Discharge Status of functional mobility see PT treatment session Britany Marinelli PT, 22 at 11:56 AM.                 Electronically signed by Kristi Lindsey PT on 2022 at 7:57 AM

## 2022-08-29 NOTE — PROGRESS NOTES
Speech Language Pathology    Speech Therapy note regarding SLP Therapy    On 8/29/2022; SLP was notified that pt was discharged [facility] 8/28/2022. D/C status per 8/27/2022 treatment and 8/25/2022 SLP evaluation and 8/26/2022 JOHN evaluation. Signed  Shelby DonohueMS,CCC,SLP [id number]  Speech and Language Pathologist  8/29/2022 at 0730 am

## 2022-08-29 NOTE — PROGRESS NOTES
lids and packets, apply toothpaste to toothbursh, and cues throughout to use items appropriately and stop tasks and move on to next activity (perseration on oral care)  UE Bathing: Maximum assistance  UE Bathing Skilled Clinical Factors: Unable to get into shower stall due to overal weakness and fatigue, used bathing wipes while seated on toilet, max A to bathe neck, back, and LUE  LE Bathing: Maximum assistance  LE Bathing Skilled Clinical Factors: Unable to get into shower stall due to overal weakness and fatigue, used bathing wipes while seated on toilet, max A to bathe lower legs, feet, L thigh, and buttocks  UE Dressing: Maximum assistance  UE Dressing Skilled Clinical Factors: max A to don bra and tshirt, unable to move LUE, able to use RUE to pull up L sleve, able to place head and R arm through sleve, required A to place L arm and pull down on L side and in back  LE Dressing: Dependent/Total  LE Dressing Skilled Clinical Factors: Total A to don brief and pants, required 2 person A to manage clothing up as Pt stood at steady but was unable to keep balance without max A, total A to don non skid socks  Toileting: Dependent/Total  Toileting Skilled Clinical Factors: Pt required total A to complete hygiene and manage clothing up, unable to reach behind to wipe, LUE flacid on lap, RUE holding onto GB    Bed mobility  Rolling to Left: Moderate assistance (max cues with guidance for location of left side including railing with increased effort and time to perform.)  Rolling to Right: Moderate assistance, Maximum assistance (set up LLE wit hcues for proper sequencing throughout)  Supine to Sit: Unable to assess  Sit to Supine: Moderate assistance, Maximum assistance  Scooting: Maximal assistance, Moderate assistance  Bed Mobility Comments: HOspital bed with HOB to patient left in sitting, increased effort and time to perform, max cues to attend to left side with increased time and effort.     Functional Transfers: Toilet Transfers  Toilet Transfers Comments: Used steady in/out of bathroom due to overall weakness and fatigue         Shower Transfers  Shower Transfers Comments: Unable to get into shower due to poor posture and overall significant weakness           Functional Mobility  Assist Level: Dependent/Total  Functional Mobility Comments: Dependent with steady, 2 person A                           Perception  Overall Perceptual Status: WFL         UE Function:  Hand Dominance  Hand Dominance: Right               Assessment:   Assessment: Pt presented to ER with significant L sided weakness and hemiparesis, acute infarcts found on imaging. PTA she was independent in ambulation and all self care tasks. She is now requiring max A-dependent with all ADL tasks and is unable to ambulate due to L sided weakness and hemiparesis, decreased propriception of L side and neglect present. She is functioning well below her baseline and would benefit from 2-3 weeks of skilled therapy services prior to being safe to discharge home; however pt seen on Saturday 8/27/22 and left AMA on 8/28/22. Family was present in room during sessions on 8/26 & 8/27 to observe but no formal training provided. No goals met d/t short LOS.   Prognosis: Fair     REQUIRES OT FOLLOW-UP: Yes  Discharge Recommendations: Continue to assess pending progress, Patient would benefit from continued therapy after discharge, 24 hour supervision or assist, Home with Home health OT, Home with nursing aide, S Level 3    Equipment Recommendations:  Unable to determine as pt left AMA; had w/c at home             Electronically signed by Fidencio Worrell OT on 8/29/2022 at 8:11 AM

## 2022-08-31 NOTE — DISCHARGE SUMMARY
Hospital Medicine Discharge Summary    Patient ID: Cintia Fofana      Patient's PCP: No primary care provider on file. Admit Date: 8/20/2022     Discharge Date: 8/24/2022      Admitting Physician: Melissa Paul MD     Discharge Physician: Rohini Carmona MD     Discharge Diagnoses: Active Hospital Problems    Diagnosis Date Noted    Paroxysmal atrial fibrillation [facility]) [I48.0] 08/21/2022     Priority: Medium    Mass of upper lobe of right lung [R91.8] 08/21/2022     Priority: Medium    Acute cerebrovascular accident (CVA) due to occlusion of right middle cerebral artery [facility]) [I63.511] 08/20/2022     Priority: Medium       The patient was seen and examined on day of discharge and this discharge summary is in conjunction with any daily progress note from day of discharge. Hospital Course:     Acute CVA  - with symptoms: left side paralysis  - out of the tPA window  - head CT: likely acute infarct within the right mid corona radiata  - CTA head/neck: No flow limiting stenosis of head or neck, No LVO  -MRI brain with acute infarct  - plavix, statin  - consult neurology- recommend hold eliquis until stroke volume is known. Recommend plavix and not asa as pt has had adverse effect from asa  -per neurology- ok to resume [facility] tomorrow, 8/23/22. Would stop plavix when starting AC. - modified barium swallow 8/23     Atrial Fibrillation   - currently rate controlled  -Eliquis restarted. May need warfarin after discharge due to cost  -HR increased today, metoprolol ordered   -tele  -cardiology consult  -echo pending      Right apical lung mass  -seen on cxr, 6.9 cm mass or masslike consolidation within the medial right lung apex. -recommend ct of chest for further evaluation but discussed with daughter Chalino Amanda and patient who do not want to pursue workup for the mass.           Exam:     BP (!) 186/76   Pulse 85   Temp 98.8 °F (37.1 °C) (Oral)   Resp 15   Ht 5' 6\" (1.676 m)   Wt 133 lb 2.5 oz (60.4 kg)   SpO2 98%   BMI 21.49 kg/m²     General appearance: No apparent distress, appears stated age and cooperative. HEENT: Pupils equal, round, and reactive to light. Conjunctivae/corneas clear. Neck: Supple, with full range of motion. No jugular venous distention. Trachea midline. Respiratory:  Normal respiratory effort. Clear to auscultation, bilaterally without Rales/Wheezes/Rhonchi. Cardiovascular: Regular rate and rhythm with normal S1/S2 without murmurs, rubs or gallops. Abdomen: Soft, non-tender, non-distended with normal bowel sounds. Musculoskelatal: No clubbing, cyanosis or edema bilaterally. Full range of motion without deformity. Skin: Skin color, texture, turgor normal.  No rashes or lesions. Neurologic:  Left sided flacidity of upper extremity, 4/5 strength left lower extremity. Cranial nerves: II-XII intact, grossly non-focal.  Psychiatric: Alert and oriented, thought content appropriate, normal insight      Consults:     IP CONSULT TO PHARMACY  PHARMACY TO CHANGE BASE FLUIDS  IP CONSULT TO HOSPITALIST  IP CONSULT TO NEUROLOGY  IP CONSULT TO SPIRITUAL SERVICES  IP CONSULT TO CARDIOLOGY  IP CONSULT TO SOCIAL WORK  IP CONSULT TO PHYSICAL MEDICINE REHAB    Significant Diagnostic Studies:       Radiology:  Fluoroscopy modified barium swallow with video   Final Result   No evidence of aspiration. Penetration with large thin bolus. Reduced   motility. Please see separate speech pathology report for full discussion of findings   and recommendations. MRI brain without contrast   Final Result   1. There is an acute infarct involving the right posterior frontal corona   radiata extending along the right basal ganglia. No mass effect or midline   shift. 2. Otherwise, no acute intracranial abnormality. 3. Mild global parenchymal volume loss with mild chronic microvascular   ischemic changes. 4. Aerated secretions are seen in the left sphenoid sinus.            CTA HEAD NECK succinate (TOPROL XL) 50 MG extended release tablet Take 1 tablet by mouth daily, Disp-30 tablet, R-3Print                Details   Multiple Vitamins-Minerals (THERAPEUTIC MULTIVITAMIN-MINERALS) tablet Take 1 tablet by mouth dailyHistorical Med      apixaban (ELIQUIS) 5 MG TABS tablet Take 1 tablet by mouth in the morning and 1 tablet before bedtime. , Disp-60 tablet, R-0Print             Time Spent on discharge is more than 30 minutes in the examination, evaluation, counseling and review of medications and discharge plan. Signed: Mitali Goss MD   8/31/2022      Thank you No primary care provider on file. for the opportunity to be involved in this patient's care. If you have any questions or concerns please feel free to contact me at [phone].

## 2022-09-28 NOTE — DISCHARGE SUMMARY
Physical Medicine & Rehabilitation  Discharge Summary     Patient Identification:  Jerry Diaz  : 1950  Admit date: 2022  Discharge date: 2022  Attending provider: No att. providers found        Primary care provider: No primary care provider on file.      Discharge Diagnoses:   Patient Active Problem List   Diagnosis    Acute cerebrovascular accident (CVA) due to occlusion of right middle cerebral artery (HCC)    Paroxysmal atrial fibrillation (HCC)    Mass of upper lobe of right lung    Right hemisphere, cerebral infarction [facility])       Discharge Functional Status:    Physical therapy:  Bed Mobility: Scooting: Maximal assistance, Moderate assistance  Transfers: Sit to Stand: Minimal Assistance, Moderate Assistance (pulling up on hallway railing and stair railing after set up of LLE and body posture with VC; X 1 hawllay railing and X 2 at step R railing (fiting bobath sling and performance of step)  Stand to sit: Minimal Assistance, Moderate Assistance  Bed to Chair: Moderate assistance, Maximum assistance (towards right stand pivot with use of arm rest of wheelchair for stability throughout, set up and cues for proper sequencing towards right with blocking of L knee throughout), Ambulation  Surface: level tile  Device: Marin rail (right)  Other Apparatus: Dorsiflex Assist, Left, Wheelchair follow (ace wrap)  Assistance: Maximum assistance, Moderate assistance  Quality of Gait: initially flexed posture provided intermittent physical assist to correct with cues for weight shifting towards right, tends to lean towards left in all upright positioning adjusted with position cues and set up, max assist to advance LLE with mod-max assist for balance, able to follow commands for seqeuncing moving RUE and BLEs  Gait Deviations: Slow Anum, Decreased step length, Decreased step height  Distance: 12' without turns  Comments: close wheelchair follow, able to perform with assist of one therapist at this time, Stairs  Curbs: 6\" (X 1 trial with right railing ascending forward/descending backwards mod assist for balance with max assist for transition/movement of LLE throughout, receptive to VC and set up)  Mobility:  , PT Equipment Recommendations  Equipment Needed: Yes  Other: COntinue to assess DME needs with functional mobility progression. , Assessment: Pt 65 y/o female with inability to return home due to physical impairments following acute CVA, prior to admission patient was highly independent with all functional mobility tasks without DME. This date patient presents with significant impairments to left side of body including ROM deficits, decreased postural awareness, left sided neglect, poor tolerance of activity, decreased ability to stand. Pt recpetive to education and set up throughout session, typically mod-max assist of one therapist for all activities but participates well. Recommend continued IP PT to promote increased left sides awareness, improve active movement with left side, improve postural awareness with transfers, promote decreased assistance with all mobility tasks, and promote return to home. Occupational therapy:  ,  , Assessment: Pt presented to ER with significant L sided weakness and hemiparesis, acute infarcts found on imaging. PTA she was independent in ambulation and all self care tasks. She is now requiring max A-dependent with all ADL tasks and is unable to ambulate due to L sided weakness and hemiparesis, decreased propriception of L side and neglect present. She is functioning well below her baseline and would benefit from 2-3 weeks of skilled therapy services prior to being safe to discharge home. Speech therapy:       Inpatient Rehabilitation Course:   William Portillo is a 66 y.o. female admitted to inpatient rehabilitation on 8/24/2022 s/p Right hemisphere, cerebral infarction ([facility] [address]   Despite advice from nursing staff and remainder of rehab team, patient elected to leave AMA.         Significant Diagnostics:   Lab Results   Component Value Date    CREATININE 0.5 (L) 08/25/2022    BUN 13 08/25/2022     08/25/2022    K 3.7 08/25/2022     08/25/2022    CO2 25 08/25/2022       Lab Results   Component Value Date    WBC 5.7 08/25/2022    HGB 12.3 08/25/2022    HCT 36.2 08/25/2022    MCV 87.2 08/25/2022     08/25/2022       Disposition:  Left AMA    Follow-up:  See after visit summary from hospitalization    Discharge Medications:     Medication List        ASK your doctor about these medications      apixaban 5 MG Tabs tablet  Commonly known as: Eliquis  Take 1 tablet by mouth in the morning and 1 tablet before bedtime. atenolol 50 MG tablet  Commonly known as: Tenormin  Take 1 tablet by mouth in the morning.      atorvastatin 80 MG tablet  Commonly known as: LIPITOR  Take 1 tablet by mouth nightly     metoprolol succinate 50 MG extended release tablet  Commonly known as: TOPROL XL  Take 1 tablet by mouth daily     therapeutic multivitamin-minerals tablet                Wilda Sanders MD

## 2023-04-15 ENCOUNTER — HOSPITAL ENCOUNTER (INPATIENT)
Age: 73
LOS: 3 days | Discharge: HOME | DRG: 391 | End: 2023-04-18
Attending: EMERGENCY MEDICINE | Admitting: INTERNAL MEDICINE
Payer: MEDICARE

## 2023-04-15 ENCOUNTER — APPOINTMENT (OUTPATIENT)
Dept: GENERAL RADIOLOGY | Age: 73
DRG: 391 | End: 2023-04-15
Payer: MEDICARE

## 2023-04-15 DIAGNOSIS — K92.2 UPPER GI BLEED: ICD-10-CM

## 2023-04-15 PROBLEM — K92.0 HEMATEMESIS: Status: ACTIVE | Noted: 2023-04-15

## 2023-04-15 LAB
ABO + RH BLD: NORMAL
ANION GAP SERPL CALCULATED.3IONS-SCNC: 13 MMOL/L (ref 3–16)
BASOPHILS # BLD: 0.1 K/UL (ref 0–0.2)
BASOPHILS NFR BLD: 0.6 %
BLD GP AB SCN SERPL QL: NORMAL
BUN SERPL-MCNC: 22 MG/DL (ref 7–20)
CALCIUM SERPL-MCNC: 8.7 MG/DL (ref 8.3–10.6)
CHLORIDE SERPL-SCNC: 102 MMOL/L (ref 99–110)
CO2 SERPL-SCNC: 25 MMOL/L (ref 21–32)
CREAT SERPL-MCNC: <0.5 MG/DL (ref 0.6–1.2)
DEPRECATED RDW RBC AUTO: 15.2 % (ref 12.4–15.4)
EOSINOPHIL # BLD: 0.1 K/UL (ref 0–0.6)
EOSINOPHIL NFR BLD: 1.1 %
GFR SERPLBLD CREATININE-BSD FMLA CKD-EPI: >60 ML/MIN/{1.73_M2}
GLUCOSE SERPL-MCNC: 115 MG/DL (ref 70–99)
HCT VFR BLD AUTO: 25.2 % (ref 36–48)
HEMOCCULT STL QL: ABNORMAL
HGB BLD-MCNC: 8.1 G/DL (ref 12–16)
LACTATE BLDV-SCNC: 1.2 MMOL/L (ref 0.4–2)
LYMPHOCYTES # BLD: 0.4 K/UL (ref 1–5.1)
LYMPHOCYTES NFR BLD: 3.8 %
MCH RBC QN AUTO: 26.4 PG (ref 26–34)
MCHC RBC AUTO-ENTMCNC: 32.3 G/DL (ref 31–36)
MCV RBC AUTO: 81.7 FL (ref 80–100)
MONOCYTES # BLD: 0.6 K/UL (ref 0–1.3)
MONOCYTES NFR BLD: 5.8 %
NEUTROPHILS # BLD: 9.1 K/UL (ref 1.7–7.7)
NEUTROPHILS NFR BLD: 88.7 %
NT-PROBNP SERPL-MCNC: 968 PG/ML (ref 0–449)
PLATELET # BLD AUTO: 471 K/UL (ref 135–450)
PMV BLD AUTO: 7.7 FL (ref 5–10.5)
POTASSIUM SERPL-SCNC: 3.9 MMOL/L (ref 3.5–5.1)
RBC # BLD AUTO: 3.08 M/UL (ref 4–5.2)
SODIUM SERPL-SCNC: 140 MMOL/L (ref 136–145)
TROPONIN T SERPL-MCNC: <0.01 NG/ML
WBC # BLD AUTO: 10.2 K/UL (ref 4–11)

## 2023-04-15 PROCEDURE — 83880 ASSAY OF NATRIURETIC PEPTIDE: CPT

## 2023-04-15 PROCEDURE — 99285 EMERGENCY DEPT VISIT HI MDM: CPT

## 2023-04-15 PROCEDURE — 82270 OCCULT BLOOD FECES: CPT

## 2023-04-15 PROCEDURE — 84484 ASSAY OF TROPONIN QUANT: CPT

## 2023-04-15 PROCEDURE — A4216 STERILE WATER/SALINE, 10 ML: HCPCS | Performed by: INTERNAL MEDICINE

## 2023-04-15 PROCEDURE — 86850 RBC ANTIBODY SCREEN: CPT

## 2023-04-15 PROCEDURE — 2580000003 HC RX 258: Performed by: EMERGENCY MEDICINE

## 2023-04-15 PROCEDURE — 86923 COMPATIBILITY TEST ELECTRIC: CPT

## 2023-04-15 PROCEDURE — P9016 RBC LEUKOCYTES REDUCED: HCPCS

## 2023-04-15 PROCEDURE — 83605 ASSAY OF LACTIC ACID: CPT

## 2023-04-15 PROCEDURE — 80048 BASIC METABOLIC PNL TOTAL CA: CPT

## 2023-04-15 PROCEDURE — C9113 INJ PANTOPRAZOLE SODIUM, VIA: HCPCS | Performed by: INTERNAL MEDICINE

## 2023-04-15 PROCEDURE — 6360000002 HC RX W HCPCS: Performed by: INTERNAL MEDICINE

## 2023-04-15 PROCEDURE — 71045 X-RAY EXAM CHEST 1 VIEW: CPT

## 2023-04-15 PROCEDURE — 2500000003 HC RX 250 WO HCPCS: Performed by: INTERNAL MEDICINE

## 2023-04-15 PROCEDURE — 85025 COMPLETE CBC W/AUTO DIFF WBC: CPT

## 2023-04-15 PROCEDURE — 93005 ELECTROCARDIOGRAM TRACING: CPT | Performed by: EMERGENCY MEDICINE

## 2023-04-15 PROCEDURE — 30233N1 TRANSFUSION OF NONAUTOLOGOUS RED BLOOD CELLS INTO PERIPHERAL VEIN, PERCUTANEOUS APPROACH: ICD-10-PCS | Performed by: INTERNAL MEDICINE

## 2023-04-15 PROCEDURE — 2580000003 HC RX 258: Performed by: INTERNAL MEDICINE

## 2023-04-15 PROCEDURE — 2060000000 HC ICU INTERMEDIATE R&B

## 2023-04-15 PROCEDURE — 86900 BLOOD TYPING SEROLOGIC ABO: CPT

## 2023-04-15 PROCEDURE — 86901 BLOOD TYPING SEROLOGIC RH(D): CPT

## 2023-04-15 PROCEDURE — 6360000002 HC RX W HCPCS: Performed by: EMERGENCY MEDICINE

## 2023-04-15 RX ORDER — SODIUM CHLORIDE 0.9 % (FLUSH) 0.9 %
5-40 SYRINGE (ML) INJECTION EVERY 12 HOURS SCHEDULED
Status: DISCONTINUED | OUTPATIENT
Start: 2023-04-15 | End: 2023-04-18 | Stop reason: HOSPADM

## 2023-04-15 RX ORDER — SODIUM CHLORIDE 9 MG/ML
INJECTION, SOLUTION INTRAVENOUS PRN
Status: DISCONTINUED | OUTPATIENT
Start: 2023-04-15 | End: 2023-04-18 | Stop reason: HOSPADM

## 2023-04-15 RX ORDER — ATORVASTATIN CALCIUM 40 MG/1
40 TABLET, FILM COATED ORAL NIGHTLY
Status: DISCONTINUED | OUTPATIENT
Start: 2023-04-16 | End: 2023-04-18 | Stop reason: HOSPADM

## 2023-04-15 RX ORDER — DIPHENHYDRAMINE HCL 25 MG
50 TABLET ORAL NIGHTLY PRN
Status: DISCONTINUED | OUTPATIENT
Start: 2023-04-15 | End: 2023-04-18 | Stop reason: HOSPADM

## 2023-04-15 RX ORDER — ONDANSETRON 4 MG/1
4 TABLET, FILM COATED ORAL DAILY PRN
COMMUNITY

## 2023-04-15 RX ORDER — SODIUM CHLORIDE 9 MG/ML
25 INJECTION, SOLUTION INTRAVENOUS PRN
Status: DISCONTINUED | OUTPATIENT
Start: 2023-04-15 | End: 2023-04-18 | Stop reason: HOSPADM

## 2023-04-15 RX ORDER — ACETAMINOPHEN 325 MG/1
650 TABLET ORAL EVERY 4 HOURS PRN
Status: DISCONTINUED | OUTPATIENT
Start: 2023-04-15 | End: 2023-04-18 | Stop reason: HOSPADM

## 2023-04-15 RX ORDER — METOPROLOL SUCCINATE 50 MG/1
50 TABLET, EXTENDED RELEASE ORAL DAILY
Status: DISCONTINUED | OUTPATIENT
Start: 2023-04-16 | End: 2023-04-18 | Stop reason: HOSPADM

## 2023-04-15 RX ORDER — 0.9 % SODIUM CHLORIDE 0.9 %
1000 INTRAVENOUS SOLUTION INTRAVENOUS ONCE
Status: COMPLETED | OUTPATIENT
Start: 2023-04-15 | End: 2023-04-15

## 2023-04-15 RX ORDER — SODIUM CHLORIDE 1000 MG
1 TABLET, SOLUBLE MISCELLANEOUS 2 TIMES DAILY
COMMUNITY

## 2023-04-15 RX ORDER — ONDANSETRON 4 MG/1
4 TABLET, ORALLY DISINTEGRATING ORAL EVERY 8 HOURS PRN
Status: DISCONTINUED | OUTPATIENT
Start: 2023-04-15 | End: 2023-04-18 | Stop reason: HOSPADM

## 2023-04-15 RX ORDER — ACETAMINOPHEN 650 MG/1
650 SUPPOSITORY RECTAL EVERY 6 HOURS PRN
Status: DISCONTINUED | OUTPATIENT
Start: 2023-04-15 | End: 2023-04-18 | Stop reason: HOSPADM

## 2023-04-15 RX ORDER — ACETAMINOPHEN 325 MG/1
650 TABLET ORAL SEE ADMIN INSTRUCTIONS
Status: DISCONTINUED | OUTPATIENT
Start: 2023-04-15 | End: 2023-04-18 | Stop reason: HOSPADM

## 2023-04-15 RX ORDER — ACETAMINOPHEN 325 MG/1
650 TABLET ORAL EVERY 6 HOURS PRN
Status: DISCONTINUED | OUTPATIENT
Start: 2023-04-15 | End: 2023-04-15 | Stop reason: DRUGHIGH

## 2023-04-15 RX ORDER — SODIUM CHLORIDE, SODIUM LACTATE, POTASSIUM CHLORIDE, CALCIUM CHLORIDE 600; 310; 30; 20 MG/100ML; MG/100ML; MG/100ML; MG/100ML
INJECTION, SOLUTION INTRAVENOUS CONTINUOUS
Status: DISCONTINUED | OUTPATIENT
Start: 2023-04-15 | End: 2023-04-17

## 2023-04-15 RX ORDER — SODIUM CHLORIDE 0.9 % (FLUSH) 0.9 %
5-40 SYRINGE (ML) INJECTION PRN
Status: DISCONTINUED | OUTPATIENT
Start: 2023-04-15 | End: 2023-04-15 | Stop reason: SDUPTHER

## 2023-04-15 RX ORDER — SODIUM CHLORIDE 9 MG/ML
INJECTION, SOLUTION INTRAVENOUS PRN
Status: CANCELLED | OUTPATIENT
Start: 2023-04-15

## 2023-04-15 RX ORDER — SENNA PLUS 8.6 MG/1
1 TABLET ORAL DAILY PRN
COMMUNITY

## 2023-04-15 RX ORDER — SODIUM CHLORIDE 0.9 % (FLUSH) 0.9 %
5-40 SYRINGE (ML) INJECTION EVERY 12 HOURS SCHEDULED
Status: DISCONTINUED | OUTPATIENT
Start: 2023-04-15 | End: 2023-04-15 | Stop reason: SDUPTHER

## 2023-04-15 RX ORDER — ONDANSETRON 2 MG/ML
4 INJECTION INTRAMUSCULAR; INTRAVENOUS EVERY 6 HOURS PRN
Status: DISCONTINUED | OUTPATIENT
Start: 2023-04-15 | End: 2023-04-18 | Stop reason: HOSPADM

## 2023-04-15 RX ORDER — LIDOCAINE HYDROCHLORIDE 10 MG/ML
5 INJECTION, SOLUTION EPIDURAL; INFILTRATION; INTRACAUDAL; PERINEURAL ONCE
Status: DISCONTINUED | OUTPATIENT
Start: 2023-04-15 | End: 2023-04-18 | Stop reason: HOSPADM

## 2023-04-15 RX ORDER — CLOPIDOGREL BISULFATE 75 MG/1
75 TABLET ORAL DAILY
Status: ON HOLD | COMMUNITY
End: 2023-04-18 | Stop reason: HOSPADM

## 2023-04-15 RX ORDER — METOPROLOL SUCCINATE 25 MG/1
25 TABLET, EXTENDED RELEASE ORAL DAILY
COMMUNITY

## 2023-04-15 RX ORDER — DILTIAZEM HYDROCHLORIDE 5 MG/ML
10 INJECTION INTRAVENOUS ONCE
Status: COMPLETED | OUTPATIENT
Start: 2023-04-15 | End: 2023-04-15

## 2023-04-15 RX ORDER — SODIUM CHLORIDE 0.9 % (FLUSH) 0.9 %
5-40 SYRINGE (ML) INJECTION PRN
Status: DISCONTINUED | OUTPATIENT
Start: 2023-04-15 | End: 2023-04-18 | Stop reason: HOSPADM

## 2023-04-15 RX ADMIN — SODIUM CHLORIDE 1000 ML: 9 INJECTION, SOLUTION INTRAVENOUS at 16:33

## 2023-04-15 RX ADMIN — SODIUM CHLORIDE, POTASSIUM CHLORIDE, SODIUM LACTATE AND CALCIUM CHLORIDE: 600; 310; 30; 20 INJECTION, SOLUTION INTRAVENOUS at 23:08

## 2023-04-15 RX ADMIN — PANTOPRAZOLE SODIUM 80 MG: 40 INJECTION, POWDER, FOR SOLUTION INTRAVENOUS at 22:44

## 2023-04-15 RX ADMIN — OCTREOTIDE ACETATE 25 MCG/HR: 500 INJECTION, SOLUTION INTRAVENOUS; SUBCUTANEOUS at 19:10

## 2023-04-15 RX ADMIN — DILTIAZEM HYDROCHLORIDE 10 MG: 5 INJECTION INTRAVENOUS at 19:59

## 2023-04-16 PROBLEM — I48.11 LONGSTANDING PERSISTENT ATRIAL FIBRILLATION (HCC): Status: ACTIVE | Noted: 2023-04-16

## 2023-04-16 PROBLEM — K92.2 UPPER GI BLEED: Status: ACTIVE | Noted: 2023-04-15

## 2023-04-16 PROBLEM — I69.354 HEMIPARESIS AFFECTING LEFT SIDE AS LATE EFFECT OF CEREBROVASCULAR ACCIDENT (CVA) (HCC): Status: ACTIVE | Noted: 2023-04-16

## 2023-04-16 LAB
ANION GAP SERPL CALCULATED.3IONS-SCNC: 12 MMOL/L (ref 3–16)
APTT BLD: 29.1 SEC (ref 22.7–35.9)
BLOOD BANK DISPENSE STATUS: NORMAL
BLOOD BANK DISPENSE STATUS: NORMAL
BLOOD BANK PRODUCT CODE: NORMAL
BLOOD BANK PRODUCT CODE: NORMAL
BPU ID: NORMAL
BPU ID: NORMAL
BUN SERPL-MCNC: 18 MG/DL (ref 7–20)
CALCIUM SERPL-MCNC: 8.1 MG/DL (ref 8.3–10.6)
CHLORIDE SERPL-SCNC: 105 MMOL/L (ref 99–110)
CO2 SERPL-SCNC: 21 MMOL/L (ref 21–32)
CREAT SERPL-MCNC: <0.5 MG/DL (ref 0.6–1.2)
DESCRIPTION BLOOD BANK: NORMAL
DESCRIPTION BLOOD BANK: NORMAL
EKG DIAGNOSIS: NORMAL
EKG Q-T INTERVAL: 278 MS
EKG QRS DURATION: 78 MS
EKG QTC CALCULATION (BAZETT): 411 MS
EKG R AXIS: 46 DEGREES
EKG T AXIS: 7 DEGREES
EKG VENTRICULAR RATE: 132 BPM
GFR SERPLBLD CREATININE-BSD FMLA CKD-EPI: >60 ML/MIN/{1.73_M2}
GLUCOSE BLD-MCNC: 77 MG/DL (ref 70–99)
GLUCOSE SERPL-MCNC: 87 MG/DL (ref 70–99)
HCT VFR BLD AUTO: 24.1 % (ref 36–48)
HCT VFR BLD AUTO: 25.6 % (ref 36–48)
HCT VFR BLD AUTO: 25.7 % (ref 36–48)
HCT VFR BLD AUTO: 27.1 % (ref 36–48)
HGB BLD-MCNC: 7.9 G/DL (ref 12–16)
HGB BLD-MCNC: 8.4 G/DL (ref 12–16)
HGB BLD-MCNC: 8.6 G/DL (ref 12–16)
HGB BLD-MCNC: 8.8 G/DL (ref 12–16)
INR PPP: 1.21 (ref 0.84–1.16)
IRON SATN MFR SERPL: 32 % (ref 15–50)
IRON SERPL-MCNC: 72 UG/DL (ref 37–145)
PERFORMED ON: NORMAL
POTASSIUM SERPL-SCNC: 4.4 MMOL/L (ref 3.5–5.1)
PROTHROMBIN TIME: 15.3 SEC (ref 11.5–14.8)
REASON FOR REJECTION: NORMAL
REJECTED TEST: NORMAL
SODIUM SERPL-SCNC: 138 MMOL/L (ref 136–145)
TIBC SERPL-MCNC: 222 UG/DL (ref 260–445)

## 2023-04-16 PROCEDURE — 99223 1ST HOSP IP/OBS HIGH 75: CPT | Performed by: INTERNAL MEDICINE

## 2023-04-16 PROCEDURE — C9113 INJ PANTOPRAZOLE SODIUM, VIA: HCPCS | Performed by: INTERNAL MEDICINE

## 2023-04-16 PROCEDURE — 36569 INSJ PICC 5 YR+ W/O IMAGING: CPT

## 2023-04-16 PROCEDURE — 02HV33Z INSERTION OF INFUSION DEVICE INTO SUPERIOR VENA CAVA, PERCUTANEOUS APPROACH: ICD-10-PCS | Performed by: INTERNAL MEDICINE

## 2023-04-16 PROCEDURE — 2580000003 HC RX 258: Performed by: INTERNAL MEDICINE

## 2023-04-16 PROCEDURE — 2709999900 HC NON-CHARGEABLE SUPPLY: Performed by: INTERNAL MEDICINE

## 2023-04-16 PROCEDURE — 6360000002 HC RX W HCPCS: Performed by: INTERNAL MEDICINE

## 2023-04-16 PROCEDURE — 2060000000 HC ICU INTERMEDIATE R&B

## 2023-04-16 PROCEDURE — 99152 MOD SED SAME PHYS/QHP 5/>YRS: CPT | Performed by: INTERNAL MEDICINE

## 2023-04-16 PROCEDURE — 94760 N-INVAS EAR/PLS OXIMETRY 1: CPT

## 2023-04-16 PROCEDURE — 6370000000 HC RX 637 (ALT 250 FOR IP): Performed by: INTERNAL MEDICINE

## 2023-04-16 PROCEDURE — XW0G886 INTRODUCTION OF MINERAL-BASED TOPICAL HEMOSTATIC AGENT INTO UPPER GI, VIA NATURAL OR ARTIFICIAL OPENING ENDOSCOPIC, NEW TECHNOLOGY GROUP 6: ICD-10-PCS | Performed by: INTERNAL MEDICINE

## 2023-04-16 PROCEDURE — 6370000000 HC RX 637 (ALT 250 FOR IP): Performed by: NURSE PRACTITIONER

## 2023-04-16 PROCEDURE — 83550 IRON BINDING TEST: CPT

## 2023-04-16 PROCEDURE — 85014 HEMATOCRIT: CPT

## 2023-04-16 PROCEDURE — 85610 PROTHROMBIN TIME: CPT

## 2023-04-16 PROCEDURE — C1751 CATH, INF, PER/CENT/MIDLINE: HCPCS

## 2023-04-16 PROCEDURE — 85018 HEMOGLOBIN: CPT

## 2023-04-16 PROCEDURE — 36430 TRANSFUSION BLD/BLD COMPNT: CPT

## 2023-04-16 PROCEDURE — 88305 TISSUE EXAM BY PATHOLOGIST: CPT

## 2023-04-16 PROCEDURE — 99153 MOD SED SAME PHYS/QHP EA: CPT | Performed by: INTERNAL MEDICINE

## 2023-04-16 PROCEDURE — 93010 ELECTROCARDIOGRAM REPORT: CPT | Performed by: INTERNAL MEDICINE

## 2023-04-16 PROCEDURE — 3609012400 HC EGD TRANSORAL BIOPSY SINGLE/MULTIPLE: Performed by: INTERNAL MEDICINE

## 2023-04-16 PROCEDURE — C1052 HEMOSTATIC AGENT, GI, TOPIC: HCPCS | Performed by: INTERNAL MEDICINE

## 2023-04-16 PROCEDURE — 83540 ASSAY OF IRON: CPT

## 2023-04-16 PROCEDURE — 3609013000 HC EGD TRANSORAL CONTROL BLEEDING ANY METHOD: Performed by: INTERNAL MEDICINE

## 2023-04-16 PROCEDURE — 80048 BASIC METABOLIC PNL TOTAL CA: CPT

## 2023-04-16 PROCEDURE — 88341 IMHCHEM/IMCYTCHM EA ADD ANTB: CPT

## 2023-04-16 PROCEDURE — 85730 THROMBOPLASTIN TIME PARTIAL: CPT

## 2023-04-16 PROCEDURE — 88342 IMHCHEM/IMCYTCHM 1ST ANTB: CPT

## 2023-04-16 PROCEDURE — 0DB68ZX EXCISION OF STOMACH, VIA NATURAL OR ARTIFICIAL OPENING ENDOSCOPIC, DIAGNOSTIC: ICD-10-PCS | Performed by: INTERNAL MEDICINE

## 2023-04-16 RX ORDER — MIDAZOLAM HYDROCHLORIDE 1 MG/ML
INJECTION INTRAMUSCULAR; INTRAVENOUS PRN
Status: DISCONTINUED | OUTPATIENT
Start: 2023-04-16 | End: 2023-04-16 | Stop reason: ALTCHOICE

## 2023-04-16 RX ORDER — SODIUM CHLORIDE 9 MG/ML
INJECTION, SOLUTION INTRAVENOUS PRN
Status: DISCONTINUED | OUTPATIENT
Start: 2023-04-16 | End: 2023-04-18 | Stop reason: HOSPADM

## 2023-04-16 RX ORDER — FENTANYL CITRATE 50 UG/ML
INJECTION, SOLUTION INTRAMUSCULAR; INTRAVENOUS PRN
Status: DISCONTINUED | OUTPATIENT
Start: 2023-04-16 | End: 2023-04-16 | Stop reason: ALTCHOICE

## 2023-04-16 RX ADMIN — ACETAMINOPHEN 650 MG: 325 TABLET ORAL at 00:38

## 2023-04-16 RX ADMIN — DIPHENHYDRAMINE HCL 50 MG: 25 TABLET ORAL at 00:39

## 2023-04-16 RX ADMIN — METOPROLOL SUCCINATE 50 MG: 50 TABLET, EXTENDED RELEASE ORAL at 08:21

## 2023-04-16 RX ADMIN — ATORVASTATIN CALCIUM 40 MG: 40 TABLET, FILM COATED ORAL at 20:41

## 2023-04-16 RX ADMIN — PANTOPRAZOLE SODIUM 40 MG: 40 INJECTION, POWDER, FOR SOLUTION INTRAVENOUS at 20:41

## 2023-04-16 RX ADMIN — Medication 10 ML: at 08:22

## 2023-04-16 RX ADMIN — Medication 10 ML: at 20:42

## 2023-04-16 RX ADMIN — PANTOPRAZOLE SODIUM 40 MG: 40 INJECTION, POWDER, FOR SOLUTION INTRAVENOUS at 08:21

## 2023-04-16 RX ADMIN — SODIUM CHLORIDE, POTASSIUM CHLORIDE, SODIUM LACTATE AND CALCIUM CHLORIDE: 600; 310; 30; 20 INJECTION, SOLUTION INTRAVENOUS at 05:32

## 2023-04-17 ENCOUNTER — APPOINTMENT (OUTPATIENT)
Dept: CT IMAGING | Age: 73
DRG: 391 | End: 2023-04-17
Payer: MEDICARE

## 2023-04-17 PROBLEM — I48.21 PERMANENT ATRIAL FIBRILLATION (HCC): Status: ACTIVE | Noted: 2023-04-17

## 2023-04-17 LAB
ALBUMIN SERPL-MCNC: 2.8 G/DL (ref 3.4–5)
ALBUMIN/GLOB SERPL: 1.5 {RATIO} (ref 1.1–2.2)
ALP SERPL-CCNC: 139 U/L (ref 40–129)
ALT SERPL-CCNC: <5 U/L (ref 10–40)
ANION GAP SERPL CALCULATED.3IONS-SCNC: 12 MMOL/L (ref 3–16)
AST SERPL-CCNC: 11 U/L (ref 15–37)
BASOPHILS # BLD: 0 K/UL (ref 0–0.2)
BASOPHILS NFR BLD: 0.5 %
BILIRUB SERPL-MCNC: 0.5 MG/DL (ref 0–1)
BUN SERPL-MCNC: 10 MG/DL (ref 7–20)
CALCIUM SERPL-MCNC: 8 MG/DL (ref 8.3–10.6)
CHLORIDE SERPL-SCNC: 104 MMOL/L (ref 99–110)
CO2 SERPL-SCNC: 24 MMOL/L (ref 21–32)
CREAT SERPL-MCNC: <0.5 MG/DL (ref 0.6–1.2)
DEPRECATED RDW RBC AUTO: 15.4 % (ref 12.4–15.4)
EOSINOPHIL # BLD: 0.2 K/UL (ref 0–0.6)
EOSINOPHIL NFR BLD: 3.1 %
GFR SERPLBLD CREATININE-BSD FMLA CKD-EPI: >60 ML/MIN/{1.73_M2}
GLUCOSE BLD-MCNC: 101 MG/DL (ref 70–99)
GLUCOSE BLD-MCNC: 128 MG/DL (ref 70–99)
GLUCOSE BLD-MCNC: 149 MG/DL (ref 70–99)
GLUCOSE BLD-MCNC: 69 MG/DL (ref 70–99)
GLUCOSE BLD-MCNC: 96 MG/DL (ref 70–99)
GLUCOSE SERPL-MCNC: 114 MG/DL (ref 70–99)
HCT VFR BLD AUTO: 25.9 % (ref 36–48)
HCT VFR BLD AUTO: 26.2 % (ref 36–48)
HCT VFR BLD AUTO: 26.7 % (ref 36–48)
HGB BLD-MCNC: 8.8 G/DL (ref 12–16)
HGB BLD-MCNC: 8.8 G/DL (ref 12–16)
HGB BLD-MCNC: 8.9 G/DL (ref 12–16)
LYMPHOCYTES # BLD: 0.3 K/UL (ref 1–5.1)
LYMPHOCYTES NFR BLD: 5.2 %
MCH RBC QN AUTO: 27.5 PG (ref 26–34)
MCHC RBC AUTO-ENTMCNC: 33.7 G/DL (ref 31–36)
MCV RBC AUTO: 81.5 FL (ref 80–100)
MONOCYTES # BLD: 0.4 K/UL (ref 0–1.3)
MONOCYTES NFR BLD: 6.5 %
NEUTROPHILS # BLD: 5.2 K/UL (ref 1.7–7.7)
NEUTROPHILS NFR BLD: 84.7 %
PERFORMED ON: ABNORMAL
PERFORMED ON: NORMAL
PLATELET # BLD AUTO: 322 K/UL (ref 135–450)
PMV BLD AUTO: 7.3 FL (ref 5–10.5)
POTASSIUM SERPL-SCNC: 3.7 MMOL/L (ref 3.5–5.1)
POTASSIUM SERPL-SCNC: 3.7 MMOL/L (ref 3.5–5.1)
PROT SERPL-MCNC: 4.7 G/DL (ref 6.4–8.2)
RBC # BLD AUTO: 3.21 M/UL (ref 4–5.2)
SODIUM SERPL-SCNC: 140 MMOL/L (ref 136–145)
WBC # BLD AUTO: 6.1 K/UL (ref 4–11)

## 2023-04-17 PROCEDURE — 6370000000 HC RX 637 (ALT 250 FOR IP): Performed by: INTERNAL MEDICINE

## 2023-04-17 PROCEDURE — 71260 CT THORAX DX C+: CPT

## 2023-04-17 PROCEDURE — 94760 N-INVAS EAR/PLS OXIMETRY 1: CPT

## 2023-04-17 PROCEDURE — 6360000004 HC RX CONTRAST MEDICATION: Performed by: INTERNAL MEDICINE

## 2023-04-17 PROCEDURE — 6360000002 HC RX W HCPCS: Performed by: INTERNAL MEDICINE

## 2023-04-17 PROCEDURE — C9113 INJ PANTOPRAZOLE SODIUM, VIA: HCPCS | Performed by: INTERNAL MEDICINE

## 2023-04-17 PROCEDURE — 99232 SBSQ HOSP IP/OBS MODERATE 35: CPT | Performed by: NURSE PRACTITIONER

## 2023-04-17 PROCEDURE — 85018 HEMOGLOBIN: CPT

## 2023-04-17 PROCEDURE — 85025 COMPLETE CBC W/AUTO DIFF WBC: CPT

## 2023-04-17 PROCEDURE — 85014 HEMATOCRIT: CPT

## 2023-04-17 PROCEDURE — 2580000003 HC RX 258: Performed by: NURSE PRACTITIONER

## 2023-04-17 PROCEDURE — 2060000000 HC ICU INTERMEDIATE R&B

## 2023-04-17 PROCEDURE — 80053 COMPREHEN METABOLIC PANEL: CPT

## 2023-04-17 PROCEDURE — 2580000003 HC RX 258: Performed by: INTERNAL MEDICINE

## 2023-04-17 RX ORDER — DEXTROSE AND SODIUM CHLORIDE 5; .45 G/100ML; G/100ML
INJECTION, SOLUTION INTRAVENOUS CONTINUOUS
Status: DISCONTINUED | OUTPATIENT
Start: 2023-04-17 | End: 2023-04-18 | Stop reason: HOSPADM

## 2023-04-17 RX ADMIN — DIPHENHYDRAMINE HCL 50 MG: 25 TABLET ORAL at 21:45

## 2023-04-17 RX ADMIN — IOPAMIDOL 75 ML: 755 INJECTION, SOLUTION INTRAVENOUS at 10:01

## 2023-04-17 RX ADMIN — PANTOPRAZOLE SODIUM 40 MG: 40 INJECTION, POWDER, FOR SOLUTION INTRAVENOUS at 21:45

## 2023-04-17 RX ADMIN — DEXTROSE AND SODIUM CHLORIDE: 5; 450 INJECTION, SOLUTION INTRAVENOUS at 02:00

## 2023-04-17 RX ADMIN — Medication 10 ML: at 21:49

## 2023-04-17 RX ADMIN — DIPHENHYDRAMINE HCL 50 MG: 25 TABLET ORAL at 00:51

## 2023-04-17 RX ADMIN — PANTOPRAZOLE SODIUM 40 MG: 40 INJECTION, POWDER, FOR SOLUTION INTRAVENOUS at 08:11

## 2023-04-17 RX ADMIN — ONDANSETRON 4 MG: 2 INJECTION INTRAMUSCULAR; INTRAVENOUS at 09:27

## 2023-04-17 RX ADMIN — ACETAMINOPHEN 650 MG: 325 TABLET ORAL at 00:50

## 2023-04-17 RX ADMIN — METOPROLOL SUCCINATE 50 MG: 50 TABLET, EXTENDED RELEASE ORAL at 08:11

## 2023-04-17 RX ADMIN — SODIUM CHLORIDE, POTASSIUM CHLORIDE, SODIUM LACTATE AND CALCIUM CHLORIDE: 600; 310; 30; 20 INJECTION, SOLUTION INTRAVENOUS at 01:00

## 2023-04-17 RX ADMIN — ATORVASTATIN CALCIUM 40 MG: 40 TABLET, FILM COATED ORAL at 21:44

## 2023-04-17 RX ADMIN — Medication 10 ML: at 08:11

## 2023-04-17 NOTE — ACP (ADVANCE CARE PLANNING)
Advance Care Planning     Advance Care Planning Activator (Inpatient)  Conversation Note      Date of ACP Conversation: 4/17/2023     Conversation Conducted with: Patient with Abdullahi 49: Next of Kin by law (only applies in absence of above) (name) daughter Jhoana Hernandez Activator: Kiana Lopez RN    Health Care Decision Maker:     Current Designated Health Care Decision Maker:     Primary Decision Maker: myrtle alvarado - Child - [phone]    Primary Decision Maker: Layne Guzmán - Child -     Today we documented Decision Maker(s) consistent with Legal Next of Kin hierarchy. Care Preferences    Ventilation: \"If you were in your present state of health and suddenly became very ill and were unable to breathe on your own, what would your preference be about the use of a ventilator (breathing machine) if it were available to you? \"      Would the patient desire the use of ventilator (breathing machine)?: no    \"If your health worsens and it becomes clear that your chance of recovery is unlikely, what would your preference be about the use of a ventilator (breathing machine) if it were available to you? \"     Would the patient desire the use of ventilator (breathing machine)?: No      Resuscitation  \"CPR works best to restart the heart when there is a sudden event, like a heart attack, in someone who is otherwise healthy. Unfortunately, CPR does not typically restart the heart for people who have serious health conditions or who are very sick. \"    \"In the event your heart stopped as a result of an underlying serious health condition, would you want attempts to be made to restart your heart (answer \"yes\" for attempt to resuscitate) or would you prefer a natural death (answer \"no\" for do not attempt to resuscitate)? \" no       [] Yes   [] No   Educated Patient / Chidi Gonzalez regarding differences between Advance Directives and portable DNR orders.     Length of ACP

## 2023-04-18 VITALS
OXYGEN SATURATION: 95 % | WEIGHT: 122.8 LBS | SYSTOLIC BLOOD PRESSURE: 113 MMHG | TEMPERATURE: 98.4 F | BODY MASS INDEX: 19.73 KG/M2 | RESPIRATION RATE: 18 BRPM | DIASTOLIC BLOOD PRESSURE: 74 MMHG | HEIGHT: 66 IN | HEART RATE: 87 BPM

## 2023-04-18 LAB
ALBUMIN SERPL-MCNC: 2.9 G/DL (ref 3.4–5)
ALBUMIN/GLOB SERPL: 1.5 {RATIO} (ref 1.1–2.2)
ALP SERPL-CCNC: 213 U/L (ref 40–129)
ALT SERPL-CCNC: 6 U/L (ref 10–40)
ANION GAP SERPL CALCULATED.3IONS-SCNC: 8 MMOL/L (ref 3–16)
AST SERPL-CCNC: 29 U/L (ref 15–37)
BASOPHILS # BLD: 0 K/UL (ref 0–0.2)
BASOPHILS NFR BLD: 0.2 %
BILIRUB SERPL-MCNC: 0.4 MG/DL (ref 0–1)
BUN SERPL-MCNC: 10 MG/DL (ref 7–20)
CALCIUM SERPL-MCNC: 8.1 MG/DL (ref 8.3–10.6)
CHLORIDE SERPL-SCNC: 106 MMOL/L (ref 99–110)
CO2 SERPL-SCNC: 27 MMOL/L (ref 21–32)
CREAT SERPL-MCNC: <0.5 MG/DL (ref 0.6–1.2)
DEPRECATED RDW RBC AUTO: 15.8 % (ref 12.4–15.4)
EOSINOPHIL # BLD: 0.1 K/UL (ref 0–0.6)
EOSINOPHIL NFR BLD: 1.5 %
GFR SERPLBLD CREATININE-BSD FMLA CKD-EPI: >60 ML/MIN/{1.73_M2}
GLUCOSE BLD-MCNC: 113 MG/DL (ref 70–99)
GLUCOSE SERPL-MCNC: 98 MG/DL (ref 70–99)
HCT VFR BLD AUTO: 26.5 % (ref 36–48)
HCT VFR BLD AUTO: 27.9 % (ref 36–48)
HGB BLD-MCNC: 8.9 G/DL (ref 12–16)
HGB BLD-MCNC: 9.3 G/DL (ref 12–16)
LYMPHOCYTES # BLD: 0.3 K/UL (ref 1–5.1)
LYMPHOCYTES NFR BLD: 3.5 %
MCH RBC QN AUTO: 27.6 PG (ref 26–34)
MCHC RBC AUTO-ENTMCNC: 33.4 G/DL (ref 31–36)
MCV RBC AUTO: 82.5 FL (ref 80–100)
MONOCYTES # BLD: 0.6 K/UL (ref 0–1.3)
MONOCYTES NFR BLD: 7 %
NEUTROPHILS # BLD: 7.7 K/UL (ref 1.7–7.7)
NEUTROPHILS NFR BLD: 87.8 %
PERFORMED ON: ABNORMAL
PLATELET # BLD AUTO: 332 K/UL (ref 135–450)
PMV BLD AUTO: 7.8 FL (ref 5–10.5)
POTASSIUM SERPL-SCNC: 3.7 MMOL/L (ref 3.5–5.1)
POTASSIUM SERPL-SCNC: 3.7 MMOL/L (ref 3.5–5.1)
PROT SERPL-MCNC: 4.8 G/DL (ref 6.4–8.2)
RBC # BLD AUTO: 3.38 M/UL (ref 4–5.2)
SODIUM SERPL-SCNC: 141 MMOL/L (ref 136–145)
WBC # BLD AUTO: 8.7 K/UL (ref 4–11)

## 2023-04-18 PROCEDURE — 80053 COMPREHEN METABOLIC PANEL: CPT

## 2023-04-18 PROCEDURE — 94760 N-INVAS EAR/PLS OXIMETRY 1: CPT

## 2023-04-18 PROCEDURE — 9990000010 HC NO CHARGE VISIT: Performed by: PHYSICAL THERAPIST

## 2023-04-18 PROCEDURE — 85025 COMPLETE CBC W/AUTO DIFF WBC: CPT

## 2023-04-18 PROCEDURE — 85018 HEMOGLOBIN: CPT

## 2023-04-18 PROCEDURE — C9113 INJ PANTOPRAZOLE SODIUM, VIA: HCPCS | Performed by: INTERNAL MEDICINE

## 2023-04-18 PROCEDURE — 6360000002 HC RX W HCPCS: Performed by: INTERNAL MEDICINE

## 2023-04-18 PROCEDURE — 85014 HEMATOCRIT: CPT

## 2023-04-18 PROCEDURE — 2580000003 HC RX 258: Performed by: INTERNAL MEDICINE

## 2023-04-18 PROCEDURE — 6370000000 HC RX 637 (ALT 250 FOR IP): Performed by: INTERNAL MEDICINE

## 2023-04-18 RX ORDER — PANTOPRAZOLE SODIUM 40 MG/1
40 TABLET, DELAYED RELEASE ORAL
Qty: 180 TABLET | Refills: 1 | Status: SHIPPED | OUTPATIENT
Start: 2023-04-18

## 2023-04-18 RX ADMIN — METOPROLOL SUCCINATE 50 MG: 50 TABLET, EXTENDED RELEASE ORAL at 09:24

## 2023-04-18 RX ADMIN — PANTOPRAZOLE SODIUM 40 MG: 40 INJECTION, POWDER, FOR SOLUTION INTRAVENOUS at 09:24

## 2023-04-18 RX ADMIN — Medication 10 ML: at 09:24

## 2023-04-18 NOTE — CARE COORDINATION
04/18/23 1117   IMM Letter   IMM Letter given to Patient/Family/Significant other/Guardian/POA/by: Provided to patient at bedside by Francisco J Snowden RN. Education provided to patient, patient reported no questions and verbalized understanding. Patient aware of 4 hours allotted time to determine if they choose to pursue Medicare appeal process.    IMM Letter date given: 04/18/23   IMM Letter time given: [id number]  Electronically signed by Francisco J Snowden RN on 4/18/2023 at 11:17 AM
nurses station. Additional CM Notes:   Discharge order noted. Family at bedside to transport. Active with [facility]. Denied any needs. The Plan for Transition of Care is related to the following treatment goals of Hematemesis [K92.0]    The Patient and/or patient representative Brennan Sandoval and her family were provided with a choice of provider and agrees with the discharge plan Yes    Freedom of choice list was provided with basic dialogue that supports the patient's individualized plan of care/goals and shares the quality data associated with the providers.  Yes    Chad Thomson RN  [facility]   Case Management Department  Ph: [phone]
you like Case Management to discuss the discharge plan with any other family members/significant others, and if so, who? Yes (daughter John Garcia)  Plans to Return to Present Housing: Yes  Other Identified Issues/Barriers to RETURNING to current housing: None  Potential Assistance needed at discharge: Home Care            Potential DME:  None  Patient expects to discharge to: [facility] [place] for transportation at discharge: Family    Financial    Payor: Diana Leonie Pérez / Plan: Radha  14. / Product Type: *No Product type* /     Does insurance require precert for SNF: Yes    Potential assistance Purchasing Medications: No  Meds-to-Beds request: Yes      Meena Mchugh [address] [place] [address] [address] [facility]  [address] [facility] [address]  Phone: [phone] Fax: [phone]      Notes:    Home Care Information:   Currently active with Home Health Care : Yes   Home Care Agency: [facility]  Services: PT/OT    Factors facilitating achievement of predicted outcomes: Family support, Motivated, Cooperative, Pleasant, Sense of humor, Good insight into deficits, and Has needed Durable Medical Equipment at home    Barriers to discharge: Decreased endurance, Upper extremity weakness, Lower extremity weakness, and Long standing deficits    Additional Case Management Notes:   DC plan to return home with daughter and family. Family can transport. Active home care with [facility]. Patient is also active with [facility] for [facility]. The Plan for Transition of Care is related to the following treatment goals of Hematemesis [K92.0]    The Patient and/or patient representative Benjamin Ratliff and her family were provided with a choice of provider and agrees with the discharge plan.  Freedom of choice list with basic dialogue that supports the patient's individualized plan of care/goals and shares the quality data associated with the providers was provided

## 2023-04-18 NOTE — PLAN OF CARE
Problem: Discharge Planning  Goal: Discharge to home or other facility with appropriate resources  Outcome: Progressing  Flowsheets (Taken 4/17/2023 2119 by Tamela Brown RN)  Discharge to home or other facility with appropriate resources: Identify barriers to discharge with patient and caregiver     Problem: Skin/Tissue Integrity  Goal: Absence of new skin breakdown  Description: 1. Monitor for areas of redness and/or skin breakdown  2. Assess vascular access sites hourly  3. Every 4-6 hours minimum:  Change oxygen saturation probe site  4. Every 4-6 hours:  If on nasal continuous positive airway pressure, respiratory therapy assess nares and determine need for appliance change or resting period.   Outcome: Progressing     Problem: Safety - Adult  Goal: Free from fall injury  Outcome: Progressing     Problem: ABCDS Injury Assessment  Goal: Absence of physical injury  Outcome: Progressing     Problem: Chronic Conditions and Co-morbidities  Goal: Patient's chronic conditions and co-morbidity symptoms are monitored and maintained or improved  Outcome: Progressing  Flowsheets (Taken 4/17/2023 2119 by Tamela Brown RN)  Care Plan - Patient's Chronic Conditions and Co-Morbidity Symptoms are Monitored and Maintained or Improved: Monitor and assess patient's chronic conditions and comorbid symptoms for stability, deterioration, or improvement

## 2023-04-18 NOTE — PLAN OF CARE
Problem: Discharge Planning  Goal: Discharge to home or other facility with appropriate resources  4/18/2023 1515 by Bubba Galdamez RN  Outcome: Completed     Problem: Skin/Tissue Integrity  Goal: Absence of new skin breakdown  Description: 1. Monitor for areas of redness and/or skin breakdown  2. Assess vascular access sites hourly  3. Every 4-6 hours minimum:  Change oxygen saturation probe site  4. Every 4-6 hours:  If on nasal continuous positive airway pressure, respiratory therapy assess nares and determine need for appliance change or resting period.   4/18/2023 1515 by Bubba Galdamez RN  Outcome: Completed     Problem: Safety - Adult  Goal: Free from fall injury  4/18/2023 1515 by Bubba Galdamez RN  Outcome: Completed     Problem: ABCDS Injury Assessment  Goal: Absence of physical injury  4/18/2023 1515 by Bubba Galdamez RN  Outcome: Completed     Problem: Chronic Conditions and Co-morbidities  Goal: Patient's chronic conditions and co-morbidity symptoms are monitored and maintained or improved  4/18/2023 1515 by Bubba Galdamez RN  Outcome: Completed

## 2023-04-18 NOTE — DISCHARGE SUMMARY
1 tablet by mouth in the morning and at bedtime      senna (SENOKOT) 8.6 MG tablet Take 1 tablet by mouth daily as needed for Constipation      Multiple Vitamins-Minerals (THERAPEUTIC MULTIVITAMIN-MINERALS) tablet Take 1 tablet by mouth daily           Current Discharge Medication List        STOP taking these medications       clopidogrel (PLAVIX) 75 MG tablet Comments:   Reason for Stopping:         atorvastatin (LIPITOR) 80 MG tablet Comments:   Reason for Stopping:         atenolol (TENORMIN) 50 MG tablet Comments:   Reason for Stopping:         apixaban (ELIQUIS) 5 MG TABS tablet Comments:   Reason for Stopping:                 Labs: For convenience and continuity at follow-up the following most recent labs are provided:      CBC:    Lab Results   Component Value Date/Time    WBC 8.7 04/18/2023 06:00 AM    HGB 9.3 04/18/2023 06:00 AM    HCT 27.9 04/18/2023 06:00 AM     04/18/2023 06:00 AM       Renal:    Lab Results   Component Value Date/Time     04/18/2023 06:00 AM    K 3.7 04/18/2023 06:00 AM    K 3.7 04/18/2023 06:00 AM     04/18/2023 06:00 AM    CO2 27 04/18/2023 06:00 AM    BUN 10 04/18/2023 06:00 AM    CREATININE <0.5 04/18/2023 06:00 AM    CALCIUM 8.1 04/18/2023 06:00 AM         Significant Diagnostic Studies    Radiology:   CT CHEST ABDOMEN PELVIS W CONTRAST Additional Contrast? None   Final Result   Addendum (preliminary) 1 of 1   ADDENDUM:   5. Peripheral airspace change in the right lower lobe with small pleural   effusions suggesting a pattern of potential aspiration or developing viral   pattern of pneumonia. 6.  Ill-defined right thyroid nodule. Correlate with any prior workup. A   nonemergent ultrasound would be recommended for further evaluation if   indicated. Final   1. Known history of gastric mass with ill-defined gastric mucosal thickening   on current CT. 2. Extensive mesenteric adenopathy in the upper abdomen around the stomach.    3. No evidence of

## 2023-04-18 NOTE — DISCHARGE INSTR - COC
patient):  Glasses, Dentures upper    RN SIGNATURE:  Electronically signed by Dalton Torres RN on 4/18/23 at 3:24 PM EDT    CASE MANAGEMENT/SOCIAL WORK SECTION    Inpatient Status Date: ***    Readmission Risk Assessment Score:  Readmission Risk              Risk of Unplanned Readmission:  15         Discharging to Facility/ Agency   Name: [address] [place] [address] Dr  Address:   [facility] Vanessa Bey  Phone:  [phone]  Fax:  [phone]    / signature: Electronically signed by Sarah Brush RN on 4/18/23 at 2:35 PM EDT    PHYSICIAN SECTION    Prognosis: {Prognosis:3850939001}    Condition at Discharge: [address] [place] [address] Patient Condition:[id number]}    Rehab Potential (if transferring to Rehab): {Prognosis:0980654558}    Recommended Labs or Other Treatments After Discharge: ***    Physician Certification: I certify the above information and transfer of Ranulfo Hart  is necessary for the continuing treatment of the diagnosis listed and that she requires {Admit to Appropriate Level of Care:82539} for {GREATER/LESS:011836363} 30 days.      Update Admission H&P: {CHP DME Changes in GIEKX:293238767}    PHYSICIAN SIGNATURE:  {Esignature:[id number]}

## 2023-04-30 ASSESSMENT — ENCOUNTER SYMPTOMS
DIARRHEA: 0
NAUSEA: 1
VOMITING: 1
BLOOD IN STOOL: 0
ABDOMINAL PAIN: 1

## 2023-06-28 NOTE — PROGRESS NOTES
Speech Language/Pathology   SPEECH LANGUAGE AND CLINICAL BEDSIDE SWALLOWING EVALUATION   Speech Therapy Department     Cathy Sawyer  AGE: 66 y.o. GENDER: female  : 1950  [id number]  EPISODE DATE:  2022    MEDICAL DIAGNOSIS: CVA  ONSET: 2022     Chief Complaint   Patient presents with    Extremity Weakness     Left sided weakness     PAST MEDICAL HISTORY  History reviewed. No pertinent past medical history. PAST SURGICAL HISTORY  History reviewed. No pertinent surgical history. ALLERGIES  No Known Allergies    CHART REVIEW:  2022 admitted with c/o L weakness  MD ADMISSION H&P HPI: 66 y.o. female with recent diagnosis of a-fib presented to [facility] ED with worsening left upper and lower ext weakness. Pt was seen in the ED 22 and was found to be in a-fib. She also had mild weakness to left lower ext and facial droop. Stroke team was consulted but pt not a candidate for thrombolytic therapy as she had mild sx. She refused to stay for further evaluation and monitoring. She did agree to start rx of eliquis and atenolol. She has since progressed and now is unable to stand/walk. She has profound facial droop. She is not able to move left upper or lower extremity at all. No numbness or sensory change. CT head today shows likely acute infarct within the right mid corona radiata. IMAGING:  CXR: 2022  Impression   Redemonstration of right apical lung mass. CT HEAD: 2022  Impression   Likely acute infarct within the right mid corona radiata. CTA HEAD/NECK: 2022  Impression   No significant interval change compared to the prior exam from 2022. No large vessel occlusion visualized. BRAIN MRI: 2022  Impression   1. There is an acute infarct involving the right posterior frontal corona   radiata extending along the right basal ganglia. No mass effect or midline   shift. 2. Otherwise, no acute intracranial abnormality.    3. Mild global parenchymal volume loss with mild chronic microvascular   ischemic changes. 4. Aerated secretions are seen in the left sphenoid sinus. Social/Functional History  Lives With:  Danielle Rosas (Crow Zuniga, 33 yr, \"works 3 jobs\"). 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 Additional Comments: Pt to ER 8/16/2022 with TIA; left AMA. Prior this, pt independent all care/mobility. Pt reports progress L sided weak since return home. Vision:  (adequate for assessment needs)  Hearing: Within functional limits      Reason for referral:  Jenniffer Salmon was referred for a Speech-Language and Bedside Swallow Evaluation to assess the efficiency of communicative effectiveness; the efficiency of swallow function, rule out aspiration and make recommendations regarding safe dietary consistencies, effective compensatory strategies, and safe eating environment. Dysphagia Treatment Diagnosis: Oropharyngeal Dysphagia   Speech Language Treatment Diagnosis: Dysarthria; Cognitive-Language      IMPRESSIONS  Patient met at bedside. Patient alert and cooperative with daughter at bedside. Patient does require encouragement for participation. concern for potential need for consideration for review of goals of care: agressive vs comfort as patient inconsistently indicates goals for comfort care? Dysphagia Diagnosis:   appears to present with moderate oropharyngeal dysphagia characterized by decreased mastication, decreased lingual manipulation, delayed swallow, and decreased laryngeal elevation. Prolonged bolus formation and movement with all; concern for excess labor and increased risk for oral pocketing with regular and easy chew solids. suspect premature bolus loss to the pharynx with all. Weak throat clear with thin appears to be eliminated with small sips.  Rec downgrade to soft/bite-sie and MBS for continued assessment. Cognitive Diagnosis:   Focus on dysphagia this date. Limited assessment: oriented x4; recalls general events/information with min cues; rec continued assessment as dysphagia and motor speech statuses stabilize    Aphasia Diagnosis: Focus on dysphagia this date. Limited assessment: adequate word-finding and thought organization for self-expression for daily needs; follows simple dx; answers simple questions; rec continued assessment as dysphagia and motor speech statuses stabilize    Speech Diagnosis:   Moderate dysarthria characterized by reduced breath support for phonation and volume as well as significantly reduced (L>R) labial and buccal strength and ROM and reduced lingual strength and ROM. ~75% speech intelligibility in brief conversation    Recommended Diet:  Diet Solids Recommendation: Soft & Bite Sized  Liquid Consistency Recommendation: Thin    Strategies:   Compensatory Swallowing Strategies : Upright as possible for all oral intake, Remain upright for 30-45 minutes after meals, Eat/Feed slowly, Small bites/sips      PLAN  Requires SLP Intervention: Yes  Therapeutic Interventions: Diet tolerance monitoring, Patient/Family education, Bolus control exercises, Oral motor exercises, Laryngeal exercises, Pharyngeal exercises, Therapeutic PO trials with SLP, cognitive-communication remediation  Duration of Treatment: ST to tx 3-5 times per week during acute admission    Dysphagia Goals  Dysphagia Goals: The patient will tolerate recommended diet without observed clinical signs of aspiration, The patient will tolerate instrumental swallowing procedure, The patient/caregiver will demonstrate understanding of compensatory strategies for improved swallowing safety.  (The patient will improve swallow function via swallow ex completed 10/10)  Speech and Language Goals  Goals:   Short-term Goals  Goal 1: Patient will improve conversational speech intelligibility to >90% via comp strats, breath support and oral motor strengthening tasks completed 10/10  Goal 2: Patient will tolerate ongoing evaluation with additional goals to be determined as appropriate    Barriers to Progress: Decreased motivation and Depression    Prognosis  Speech Therapy Prognosis  Prognosis: Guarded  Prognosis Considerations: Participation Level; Co-Morbidities; Motivation    Education  Consulted and agree with results and recommendations: Patient, RN, Family member  Patient Education: completed on results/recs/plan  Patient Education Response: Verbalizes understanding, Needs reinforcement      Discharge Recommendations:  Pt will benefit from continued skilled Speech Therapy for Speech and Dysphagia services, prior to returning home. OBJECTIVE  ASSESSMENT: Included Clinical Evaluation of Swallowing; Oral Motor Speech Evaluation and Cognitive-Linguistic Assessment  Oral Motor  Labial: Left droop; Decreased rate  Lingual: Decreased strength;Decreased rate (appears symmetric; reports asymmetry earlier)  Velum: No Impairment  Mandible: Restricted  Motor Speech  Apraxic Characteristics: None  Dysarthric Characteristics: Blended word boundaries; Decreased breath support;Decreased rate; Imprecise  Overall Impairment Severity: Moderate  Auditory Comprehension  One Step Commands: WFL  Reading Comprehension  Reading Status: Unable to assess  Verbal Expression  Verbal Expression: Within functional limits (for self-expression of wants/needs)  Written Expression  Written Expression: Unable to assess  Pragmatics/Social Functioning  Pragmatics: Within functional limits  Cognitive-Language  Overall Orientation Status: Within Functional Limits  Attention  Sustained Attention: WFL  Memory: Unable to assess  Problem Solving: Unable to assess  Numeric Reasoning: Unable to assess  Abstract Reasoning: Unable to assess  Safety/Judgment: Unable to assess  Dysphagia  decreased mastication: regular and easy chew solids   decreased lingual manipulation: all   suspect premature bolus loss to the pharynx: all   concern for excess labor and oral pocketing with regular solids as well as easy to chew; rec soft/bite-sized with monitor for tolerance. delayed swallow: all   decreased laryngeal elevation: all   weak throat clear: thin - appears to eliminate with small sip - rec MBS for continued assessment      Please accept this as Speech Therapy Discharge status, if pt is discharged prior to next therapy session. Timed Code Treatment: 0  Total Treatment Time: 35  12:55 PM-1:30 PM    SIGNED:   Eden Rodriguez.  Mandi Locke, #[id number]  Speech-Language Pathologist  Portable phone: [phone]  08/22/22 1:30 PM High risk for falls

## 2023-06-29 NOTE — PROGRESS NOTES
Discharge orders acknowledged by RN . Discharge teaching completed with pt and family. AVS reviewed and all questions answered. Medication regimen reviewed and pt understands schedule. Follow up appointments also reviewed with pt and resources given for discharge. Pt was sent electronic to be filled and understands schedule. IV removed. Bedside monitor removed from pt. 60+ minutes of education completed. Required core measures completed. Pt vitals WDL. Pt discharged with all belongings to home with family and home healthcare. Pt transported off of unit via wheelchair. No complications.
Gastroenterology Progress Note    Adam Zheng is a 74 y.o. female patient. Principal Problem:    Permanent atrial fibrillation (HCC)  Active Problems:    Upper GI bleed    Longstanding persistent atrial fibrillation (HCC)    Hemiparesis affecting left side as late effect of cerebrovascular accident (CVA) ([facility] [address]  Resolved Problems:    * No resolved hospital problems. *      SUBJECTIVE:  Says she is going home today. No nausea, vomiting, abdominal pain. No further hematemesis.       Current Facility-Administered Medications: dextrose 5 % and 0.45 % sodium chloride infusion, , IntraVENous, Continuous  0.9 % sodium chloride infusion, , IntraVENous, PRN  acetaminophen (TYLENOL) tablet 650 mg, 650 mg, Oral, See Admin Instructions  lidocaine PF 1 % injection 5 mL, 5 mL, IntraDERmal, Once  sodium chloride flush 0.9 % injection 5-40 mL, 5-40 mL, IntraVENous, 2 times per day  sodium chloride flush 0.9 % injection 5-40 mL, 5-40 mL, IntraVENous, PRN  0.9 % sodium chloride infusion, 25 mL, IntraVENous, PRN  atorvastatin (LIPITOR) tablet 40 mg, 40 mg, Oral, Nightly  metoprolol succinate (TOPROL XL) extended release tablet 50 mg, 50 mg, Oral, Daily  0.9 % sodium chloride infusion, , IntraVENous, PRN  ondansetron (ZOFRAN-ODT) disintegrating tablet 4 mg, 4 mg, Oral, Q8H PRN **OR** ondansetron (ZOFRAN) injection 4 mg, 4 mg, IntraVENous, Q6H PRN  [DISCONTINUED] acetaminophen (TYLENOL) tablet 650 mg, 650 mg, Oral, Q6H PRN **OR** acetaminophen (TYLENOL) suppository 650 mg, 650 mg, Rectal, Q6H PRN  [COMPLETED] pantoprazole (PROTONIX) 80 mg in sodium chloride (PF) 0.9 % 20 mL injection, 80 mg, IntraVENous, Once **FOLLOWED BY** pantoprazole (PROTONIX) 40 mg in sodium chloride (PF) 0.9 % 10 mL injection, 40 mg, IntraVENous, Q12H  acetaminophen (TYLENOL) tablet 650 mg, 650 mg, Oral, Q4H PRN  diphenhydrAMINE (BENADRYL) tablet 50 mg, 50 mg, Oral, Nightly PRN    Physical    VITALS:  /74   Pulse 87   Temp 98.4 °F (36.9 °C)
Gastroenterology Progress Note    Pipo Engle is a 74 y.o. female patient. Principal Problem:    Upper GI bleed  Active Problems:    Longstanding persistent atrial fibrillation (HCC)    Hemiparesis affecting left side as late effect of cerebrovascular accident (CVA) ([facility] [address]  Resolved Problems:    * No resolved hospital problems. *      SUBJECTIVE:  She is very hungry. No nausea, vomiting, abdominal pain. No further hematemesis.       Current Facility-Administered Medications: dextrose 5 % and 0.45 % sodium chloride infusion, , IntraVENous, Continuous  0.9 % sodium chloride infusion, , IntraVENous, PRN  acetaminophen (TYLENOL) tablet 650 mg, 650 mg, Oral, See Admin Instructions  lidocaine PF 1 % injection 5 mL, 5 mL, IntraDERmal, Once  sodium chloride flush 0.9 % injection 5-40 mL, 5-40 mL, IntraVENous, 2 times per day  sodium chloride flush 0.9 % injection 5-40 mL, 5-40 mL, IntraVENous, PRN  0.9 % sodium chloride infusion, 25 mL, IntraVENous, PRN  atorvastatin (LIPITOR) tablet 40 mg, 40 mg, Oral, Nightly  metoprolol succinate (TOPROL XL) extended release tablet 50 mg, 50 mg, Oral, Daily  0.9 % sodium chloride infusion, , IntraVENous, PRN  ondansetron (ZOFRAN-ODT) disintegrating tablet 4 mg, 4 mg, Oral, Q8H PRN **OR** ondansetron (ZOFRAN) injection 4 mg, 4 mg, IntraVENous, Q6H PRN  [DISCONTINUED] acetaminophen (TYLENOL) tablet 650 mg, 650 mg, Oral, Q6H PRN **OR** acetaminophen (TYLENOL) suppository 650 mg, 650 mg, Rectal, Q6H PRN  [COMPLETED] pantoprazole (PROTONIX) 80 mg in sodium chloride (PF) 0.9 % 20 mL injection, 80 mg, IntraVENous, Once **FOLLOWED BY** pantoprazole (PROTONIX) 40 mg in sodium chloride (PF) 0.9 % 10 mL injection, 40 mg, IntraVENous, Q12H  acetaminophen (TYLENOL) tablet 650 mg, 650 mg, Oral, Q4H PRN  diphenhydrAMINE (BENADRYL) tablet 50 mg, 50 mg, Oral, Nightly PRN    Physical    VITALS:  BP (!) 158/74   Pulse 74   Temp 97.1 °F (36.2 °C) (Oral)   Resp 20   Ht 5' 6\" (1.676 m)   Wt 124
Hospitalist Progress Note      Name:  Marcelino Madison /Age/Sex: 1950  (87 y.o. female)   MRN & CSN:  [id number] & [id number] Admission Date/Time: 4/15/2023  4:16 PM   Location:  [id number] PCP: No primary care provider on file. Hospital Day: 3    Assessment and Plan:   26-year-old female with past medical history significant for previous CVA with residual left-sided hemiparesis atrial fibrillation on chronic antiplatelet therapy with Plavix presents with episode of hematemesis. Patient reports 2 episode of hematemesis this morning with significant amount of blood, she reports dizziness and lightheadedness. She is mostly bedbound due to her previous CVA. She denies chest pain shortness of breath fever chills. Laboratory studies show hemoglobin of 8.1, platelet count of 498 and creatinine 1.5. Open arrival to the ED, she was noted to be in atrial fibrillation with ventricular rate of 132. Acute blood loss anemia  Hematemesis  Hemoglobin on admission 8.1. 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 2. Extensive mesenteric adenopathy in the upper abdomen around the stomach. 3. No evidence of distant metastatic disease.    4. Masslike focus of airspace change in the right lung apex with stability     On PPI iv   GI on board ,   Pt/ot       Atrial fibrillation with rapid ventricular response              Give 10 mg of
Occupational Therapy      OT order received and chart reviewed. Pt seen with family at bedside. Pt declining therapy stating she has all the equipment at home and knows how to complete transfers/ADLs with family assist. Family at bedside and verified no need for acute OT. Will discharged from caseload at this time. RN aware.     Electronically signed by Jarrod Singh OT on 4/18/2023 at 1:04 PM
PCA unaware that patient needs blood glucose checked. RN advised scheduled to check Q4H. Patient already ate breakfast. Blood glucose 98 on morning labs. Patient awake, alert, interactive. Will resume blood glucose monitoring prior to lunch.
Patient A/O x4. VSS on room air. Denies pain. Patient's left side flaccid from previous CVA. Patient refusing turns, refusing to elevated heels or edematous left arm. Patient educated on turning to prevent skin breakdown. Continues to refuse turns. RN spoke with patient's daughter, Radha Napier, on three different occasions via telephone this shift. Patient discharging to home today. Patient very excited to see her dog. Family at bedside. Awaiting wheelchair for D/C. Bed alarm on and call light within reach.
Patient has been encouraged to be repositioned throughout the night. She was educated on the importance of skin integrity. Pt understood teaching, but remains to decline all repositioning unless it is needed for a bed change.  Electronically signed by Merry Painter RN on 4/18/2023 at 3:54 AM
Patient requested to not have her blood drawn until the morning labs at 6.  Electronically signed by Lay Huffman RN on 4/17/2023 at 11:45 PM
Physical Therapy  Ranulfo Hart  [id number]  [id number]    PT orders received and chart reviewed; attempted to see for PT eval however pt and family refuse; state they have everything she needs and don't want therapy in hosp; will sign off from therapy at this time  Electronically signed by KEMAL HAYWOOD, PT on 4/18/2023 at 1:04 PM
Pt blood sugar rechecked at 01:30 and was 69. Secure message sent to Karon Godoy NP, d5 and 0.45% sodium chloride started, and q4 blood sugars ordered. Pt rechecked after and blood sugar came up to 101.      Electronically signed by Ofelia Aguero RN on 4/17/2023 at 3:44 AM
ongoing bleeding      Assessment/Plan:    1.) Permanent AF:   HR controlled  Previously on eliquis- restart when cleared by GI  Chadsvasc- 6  Cont Toprol XL for rate control    2.) Hx CVA:   Resume Plavix when OK w GI    Disposition- patient is mostly home bound D/T hemiparesis L side. [address] Patient would prefer they manage her AF and [address] upon discharge. Cardiology will sign off.      Electronically signed by LEDY Rubin CNP on 4/17/2023 at 10:18 AM
with RW/needs device

## (undated) DEVICE — BITE BLOCK ENDOSCP AD 60 FR W/ ADJ STRP PLAS GRN BLOX

## (undated) DEVICE — ENDOSCOPY KIT: Brand: MEDLINE INDUSTRIES, INC.

## (undated) DEVICE — FORMALIN CLEAR VIAL 20 ML 10%

## (undated) DEVICE — FORCEPS BX 240CM 2.4MM L NDL RAD JAW 4 M00513334

## (undated) DEVICE — HEMOSPRAY ENDOSCOPIC HEMOSTAT: Brand: HEMOSPRAY